# Patient Record
Sex: FEMALE | Race: WHITE | Employment: OTHER | URBAN - METROPOLITAN AREA
[De-identification: names, ages, dates, MRNs, and addresses within clinical notes are randomized per-mention and may not be internally consistent; named-entity substitution may affect disease eponyms.]

---

## 2017-02-14 DIAGNOSIS — I10 ESSENTIAL HYPERTENSION: ICD-10-CM

## 2017-02-14 DIAGNOSIS — E78.2 MIXED HYPERLIPIDEMIA: ICD-10-CM

## 2017-02-14 RX ORDER — ATORVASTATIN CALCIUM 10 MG/1
10 TABLET, FILM COATED ORAL DAILY
Qty: 90 TAB | Refills: 0 | Status: SHIPPED | OUTPATIENT
Start: 2017-02-14 | End: 2017-03-02 | Stop reason: SDUPTHER

## 2017-02-14 RX ORDER — LISINOPRIL 5 MG/1
5 TABLET ORAL DAILY
Qty: 90 TAB | Refills: 0 | Status: SHIPPED | OUTPATIENT
Start: 2017-02-14 | End: 2017-02-15 | Stop reason: SDUPTHER

## 2017-02-15 DIAGNOSIS — I10 ESSENTIAL HYPERTENSION: ICD-10-CM

## 2017-02-15 RX ORDER — LISINOPRIL 5 MG/1
TABLET ORAL
Qty: 30 TAB | Refills: 0 | Status: SHIPPED | OUTPATIENT
Start: 2017-02-15 | End: 2017-02-21 | Stop reason: SDUPTHER

## 2017-02-17 ENCOUNTER — HOSPITAL ENCOUNTER (EMERGENCY)
Age: 82
Discharge: HOME | End: 2017-02-17
Attending: EMERGENCY MEDICINE
Payer: MEDICARE

## 2017-02-17 VITALS
DIASTOLIC BLOOD PRESSURE: 92 MMHG | WEIGHT: 145 LBS | SYSTOLIC BLOOD PRESSURE: 189 MMHG | OXYGEN SATURATION: 96 % | RESPIRATION RATE: 20 BRPM | HEIGHT: 60 IN | TEMPERATURE: 98 F | BODY MASS INDEX: 28.47 KG/M2 | HEART RATE: 89 BPM

## 2017-02-17 DIAGNOSIS — B02.9 HERPES ZOSTER WITHOUT COMPLICATION: Primary | ICD-10-CM

## 2017-02-17 PROCEDURE — 99283 EMERGENCY DEPT VISIT LOW MDM: CPT

## 2017-02-17 RX ORDER — TRAMADOL HYDROCHLORIDE 50 MG/1
50 TABLET ORAL
Qty: 15 TAB | Refills: 0 | Status: SHIPPED | OUTPATIENT
Start: 2017-02-17 | End: 2017-07-07

## 2017-02-17 RX ORDER — VALACYCLOVIR HYDROCHLORIDE 1 G/1
1000 TABLET, FILM COATED ORAL 3 TIMES DAILY
Qty: 21 TAB | Refills: 0 | Status: SHIPPED | OUTPATIENT
Start: 2017-02-17 | End: 2017-02-24

## 2017-02-17 NOTE — ED TRIAGE NOTES
Pt states rash on the right side of her neck and scalp states began 1 week ago states painful and itching denies fevers and other symptoms

## 2017-02-17 NOTE — ED PROVIDER NOTES
HPI   Patient is a 81 yo female that is coming for evaluation of rash on the back of her neck. Patient states that around 1 week ago she started to develop some pain around her neck and the right trapezius muscle when she later developed a rash. Rash started as one vesicle that spread later to her scalp, upper and right side of the neck. Patient states rash is painful and very itchy, has tried OTC hydrocortisone 1% which helped with her symptoms. Denies any fever or chills, new perfumes, creams, soaps or detergents. She had her shingles vaccine. No other concerns or complaints at this time.      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, episode of care unspecified     Allergic rhinitis, cause unspecified     Brachial neuritis or radiculitis NOS     Cervicalgia     Disorder of bone and cartilage, unspecified     Dizziness and giddiness     Esophageal reflux     Hemorrhoid     Hypercholesterolemia     Insomnia, unspecified     Osteoarthrosis, unspecified whether generalized or localized, unspecified site     Osteoporosis, unspecified     Other abnormal blood chemistry     Other and unspecified hyperlipidemia     Pain in joint, forearm     Pain in joint, pelvic region and thigh     Pain in joint, shoulder region     Unspecified essential hypertension     Unspecified transient cerebral ischemia     Unspecified vitamin D deficiency        Past Surgical History:   Procedure Laterality Date    Hx cholecystectomy      Hx partial hysterectomy      Hx cataract removal Bilateral     Hx bunionectomy      Hx tubal ligation           Family History:   Problem Relation Age of Onset    Cancer Other     Stroke Mother     Stroke Father     Heart Disease Sister     Heart Disease Brother     Stroke Brother        Social History     Social History    Marital status:      Spouse name: N/A    Number of children: N/A    Years of education: N/A Occupational History    Not on file. Social History Main Topics    Smoking status: Former Smoker     Types: Cigarettes     Quit date: 3/3/1995    Smokeless tobacco: Never Used    Alcohol use 14.0 oz/week     28 Standard drinks or equivalent per week      Comment: Lite beer    Drug use: No    Sexual activity: No     Other Topics Concern    Not on file     Social History Narrative         ALLERGIES: Egg    Review of Systems   Constitutional: Negative for chills and fever. HENT: Negative for ear pain and facial swelling. Eyes: Negative for pain. Respiratory: Negative for shortness of breath and wheezing. Cardiovascular: Negative for chest pain. Musculoskeletal: Positive for neck pain. Negative for arthralgias. Skin: Positive for rash. Itchiness       Neurological: Negative for dizziness and headaches. Hematological: Negative for adenopathy. Vitals:    02/17/17 0931 02/17/17 1031   BP: (!) 189/92    Pulse: 89    Resp: 20    Temp: 98 °F (36.7 °C)    SpO2: 100% 96%   Weight: 65.8 kg (145 lb)    Height: 5' (1.524 m)             Physical Exam   General appearance - alert, well appearing, and in no distress  Mental status - alert, oriented to person, place, and time  Eyes - pupils equal and reactive, extraocular eye movements intact  Ears - bilateral TM's and external ear canals normal  Nose - normal and patent, no erythema, discharge or polyps  Mouth - mucous membranes moist, pharynx normal without lesions  Neck - supple, no significant adenopathy   Chest - clear to auscultation, no wheezes, rales or rhonchi, symmetric air entry  Heart - normal rate, regular rhythm, normal S1, S2, no murmurs, rubs, clicks or gallops  Skin - multiples area of an eczematous rash with vesicles on the back and right side of the neck spreading to her scalp. One dry vesicle located above her right scapula.           MDM  Number of Diagnoses or Management Options  Herpes zoster without complication: established and improving     Amount and/or Complexity of Data Reviewed  Obtain history from someone other than the patient: yes  Review and summarize past medical records: yes  Discuss the patient with other providers: yes    Risk of Complications, Morbidity, and/or Mortality  Presenting problems: moderate  Diagnostic procedures: moderate  Management options: moderate      ED Course     Patient is a 79 yo female that is coming for evaluation of rash. Diagnosed with shingles. Will discharge home with course of Valtrex 1g TID x 7 days along with pain meds (Tramadol Q8 prn). BP also noted to be elevated to 188/89, likely due to pain, advised patient to follow up with PCP within 1 week for this as well. An After Visit Summary was printed and given to the patient. Patient/Plan discussed in details with Dr. Loreto Dawkins (ED Attending Physician).     Lisa Cotton MD  Family Medicine Resident          Procedures

## 2017-02-17 NOTE — DISCHARGE INSTRUCTIONS

## 2017-02-21 DIAGNOSIS — I10 ESSENTIAL HYPERTENSION: ICD-10-CM

## 2017-02-21 RX ORDER — LISINOPRIL 5 MG/1
TABLET ORAL
Qty: 30 TAB | Refills: 0 | Status: SHIPPED | OUTPATIENT
Start: 2017-02-21 | End: 2017-03-02 | Stop reason: SDUPTHER

## 2017-02-21 NOTE — TELEPHONE ENCOUNTER
Patient is asking for her 90 day supply of BP medication to be mailed to her since her  just has surgery and can't drive her to her apt for tomorrow.     Please advise thanks

## 2017-03-02 ENCOUNTER — OFFICE VISIT (OUTPATIENT)
Dept: FAMILY MEDICINE CLINIC | Age: 82
End: 2017-03-02

## 2017-03-02 VITALS
DIASTOLIC BLOOD PRESSURE: 87 MMHG | HEIGHT: 60 IN | SYSTOLIC BLOOD PRESSURE: 149 MMHG | OXYGEN SATURATION: 98 % | WEIGHT: 145 LBS | RESPIRATION RATE: 18 BRPM | HEART RATE: 88 BPM | BODY MASS INDEX: 28.47 KG/M2 | TEMPERATURE: 98 F

## 2017-03-02 DIAGNOSIS — Z00.00 ROUTINE GENERAL MEDICAL EXAMINATION AT A HEALTH CARE FACILITY: Primary | ICD-10-CM

## 2017-03-02 DIAGNOSIS — I10 ESSENTIAL HYPERTENSION: ICD-10-CM

## 2017-03-02 DIAGNOSIS — E78.2 MIXED HYPERLIPIDEMIA: ICD-10-CM

## 2017-03-02 DIAGNOSIS — Z13.39 SCREENING FOR ALCOHOLISM: ICD-10-CM

## 2017-03-02 DIAGNOSIS — K21.9 GASTROESOPHAGEAL REFLUX DISEASE WITHOUT ESOPHAGITIS: ICD-10-CM

## 2017-03-02 RX ORDER — LISINOPRIL 5 MG/1
TABLET ORAL
Qty: 90 TAB | Refills: 3 | Status: SHIPPED | OUTPATIENT
Start: 2017-03-02 | End: 2017-03-02 | Stop reason: SDUPTHER

## 2017-03-02 RX ORDER — LISINOPRIL 5 MG/1
TABLET ORAL
Qty: 90 TAB | Refills: 3 | Status: SHIPPED | OUTPATIENT
Start: 2017-03-02 | End: 2017-06-01 | Stop reason: SDUPTHER

## 2017-03-02 RX ORDER — PHENOL/SODIUM PHENOLATE
20 AEROSOL, SPRAY (ML) MUCOUS MEMBRANE DAILY
Qty: 90 TAB | Refills: 3 | Status: SHIPPED | OUTPATIENT
Start: 2017-03-02 | End: 2017-11-29 | Stop reason: SDUPTHER

## 2017-03-02 RX ORDER — ATORVASTATIN CALCIUM 10 MG/1
10 TABLET, FILM COATED ORAL DAILY
Qty: 90 TAB | Refills: 3 | Status: SHIPPED | OUTPATIENT
Start: 2017-03-02 | End: 2017-03-02 | Stop reason: SDUPTHER

## 2017-03-02 RX ORDER — ATORVASTATIN CALCIUM 10 MG/1
10 TABLET, FILM COATED ORAL DAILY
Qty: 90 TAB | Refills: 3 | Status: SHIPPED | OUTPATIENT
Start: 2017-03-02 | End: 2018-03-14 | Stop reason: SDUPTHER

## 2017-03-02 RX ORDER — PHENOL/SODIUM PHENOLATE
20 AEROSOL, SPRAY (ML) MUCOUS MEMBRANE DAILY
Qty: 90 TAB | Refills: 3 | Status: SHIPPED | OUTPATIENT
Start: 2017-03-02 | End: 2017-03-02 | Stop reason: SDUPTHER

## 2017-03-02 NOTE — PATIENT INSTRUCTIONS

## 2017-03-02 NOTE — PROGRESS NOTES
1. Have you been to the ER, urgent care clinic since your last visit? Hospitalized since your last visit? No    2. Have you seen or consulted any other health care providers outside of the [address] [place] [address] since your last visit? Include any pap smears or colon screening.  No   Chief Complaint   Patient presents with   [facility] Annual Wellness Visit

## 2017-03-02 NOTE — PROGRESS NOTES
This is a Subsequent Medicare Annual Wellness Visit providing Personalized Prevention Plan Services (PPPS) (Performed 12 months after initial AWV and PPPS )    I have reviewed the patient's medical history in detail and updated the computerized patient record. 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, episode of care unspecified     Allergic rhinitis, cause unspecified     Brachial neuritis or radiculitis NOS     Cervicalgia     Disorder of bone and cartilage, unspecified     Dizziness and giddiness     Esophageal reflux     Hemorrhoid     Hypercholesterolemia     Insomnia, unspecified     Osteoarthrosis, unspecified whether generalized or localized, unspecified site     Osteoporosis, unspecified     Other abnormal blood chemistry     Other and unspecified hyperlipidemia     Pain in joint, forearm     Pain in joint, pelvic region and thigh     Pain in joint, shoulder region     Unspecified essential hypertension     Unspecified transient cerebral ischemia     Unspecified vitamin D deficiency       Past Surgical History:   Procedure Laterality Date    HX BUNIONECTOMY      HX CATARACT REMOVAL Bilateral     HX CHOLECYSTECTOMY      HX PARTIAL HYSTERECTOMY      HX TUBAL LIGATION       Current Outpatient Prescriptions   Medication Sig Dispense Refill    lisinopril (PRINIVIL, ZESTRIL) 5 mg tablet TAKE ONE TABLET BY MOUTH ONCE DAILY FOR HYPERTENSION. 90 Tab 3    atorvastatin (LIPITOR) 10 mg tablet Take 1 Tab by mouth daily. 90 Tab 3    Omeprazole delayed release (PRILOSEC D/R) 20 mg tablet Take 1 Tab by mouth daily. 90 Tab 3    traMADol (ULTRAM) 50 mg tablet Take 1 Tab by mouth every eight (8) hours as needed for Pain. Max Daily Amount: 150 mg. 15 Tab 0    VIT C/CHERRY & CELERY EX/GRP E (TART CHERRY PO) Take  by mouth.  loratadine (CLARITIN) 10 mg tablet Take 10 mg by mouth daily.       naproxen sodium (ALEVE) 220 mg tablet Take 220 mg by mouth two (2) times daily (with meals). Allergies   Allergen Reactions    Egg Nausea Only     Family History   Problem Relation Age of Onset    Cancer Other     Stroke Mother     Stroke Father     Heart Disease Sister     Heart Disease Brother     Stroke Brother      Social History   Substance Use Topics    Smoking status: Former Smoker     Types: Cigarettes     Quit date: 3/3/1995    Smokeless tobacco: Never Used    Alcohol use 14.0 oz/week     28 Standard drinks or equivalent per week      Comment: Lite beer     Patient Active Problem List   Diagnosis Code    Daytime somnolence R40.0    DDD (degenerative disc disease) [id number]    Hyperlipidemia E78.5       Depression Risk Factor Screening:     PHQ 2 / 9, over the last two weeks 3/2/2017   Little interest or pleasure in doing things Not at all   Feeling down, depressed or hopeless Not at all   Total Score PHQ 2 0     Alcohol Risk Factor Screening: On any occasion during the past 3 months, have you had more than 3 drinks containing alcohol?  yes    Do you average more than 7 drinks per week? Yes      Functional Ability and Level of Safety:     Hearing Loss   normal-to-mild    Activities of Daily Living   Self-care. Requires assistance with: no ADLs    Fall Risk     Fall Risk Assessment, last 12 mths 3/2/2017   Able to walk? Yes   Fall in past 12 months? No   Fall with injury? -   Number of falls in past 12 months -   Fall Risk Score -     Abuse Screen   Patient is not abused    Review of Systems   A comprehensive review of systems was negative except for that written in the HPI.     Physical Examination     Evaluation of Cognitive Function:  Mood/affect:  happy  Appearance: age appropriate  Family member/caregiver input: none present    Visit Vitals    /87 (BP 1 Location: Right arm, BP Patient Position: Sitting)    Pulse 88    Temp 98 °F (36.7 °C) (Oral)    Resp 18    Ht 5' (1.524 m)    Wt 145 lb (65.8 kg)    SpO2 98%    BMI 28.32 kg/m2     General:  Alert, cooperative, no distress, appears stated age. Head:  Normocephalic, without obvious abnormality, atraumatic. Eyes:  Conjunctivae/corneas clear. PERRL, EOMs intact. Fundi benign. Ears:  Normal TMs and external ear canals both ears. Nose: Nares normal. Septum midline. Mucosa normal. No drainage or sinus tenderness. Throat: Lips, mucosa, and tongue normal. Teeth and gums normal.   Neck: Supple, symmetrical, trachea midline, no adenopathy, thyroid: no enlargement/tenderness/nodules, no carotid bruit and no JVD. Back:   Symmetric, no curvature. ROM normal. No CVA tenderness. Lungs:   Clear to auscultation bilaterally. Chest wall:  No tenderness or deformity. Heart:  Regular rate and rhythm, S1, S2 normal, 2/6 BERNICE murmur on the right SB,no click, rub or gallop. Breast Exam:  No tenderness, masses, or nipple abnormality. Abdomen:   Soft, non-tender. Bowel sounds normal. No masses,  No organomegaly. Extremities: Extremities normal, atraumatic, no cyanosis or edema. Pulses: 2+ and symmetric all extremities. Skin: Skin color, texture, turgor normal. No rashes or lesions. Lymph nodes: Cervical, supraclavicular, and axillary nodes normal.   Neurologic: CNII-XII intact. Normal strength, sensation and reflexes throughout. Patient Care Team:  Guy Lopez MD as PCP - General (Family Practice)  Andrae Morales MD (Orthopedic Surgery)    Advice/Referrals/Counseling   Education and counseling provided:  Are appropriate based on today's review and evaluation      Assessment/Plan       ICD-10-CM ICD-9-CM    1. Routine general medical examination at a health care facility Z00.00 V70.0    2. Screening for alcoholism Z13.89 V79.1    3. Essential hypertension D05 733.5 METABOLIC PANEL, COMPREHENSIVE      lisinopril (PRINIVIL, ZESTRIL) 5 mg tablet      DISCONTINUED: lisinopril (PRINIVIL, ZESTRIL) 5 mg tablet   4.  Mixed hyperlipidemia E78.2 272.2 atorvastatin (LIPITOR) 10 mg tablet      DISCONTINUED: atorvastatin (LIPITOR) 10 mg tablet   5. Gastroesophageal reflux disease without esophagitis K21.9 530.81 Omeprazole delayed release (PRILOSEC D/R) 20 mg tablet      DISCONTINUED: Omeprazole delayed release (PRILOSEC D/R) 20 mg tablet   . Discussed ACP for more than 30 min but did not complete the form. We went through the form, discussed it.  She was given two forms for her  to complete as well as her

## 2017-03-02 NOTE — MR AVS SNAPSHOT
Visit Information Date & Time Provider Department Dept. Phone Encounter #  
 3/2/2017  1:45 PM Michelle Anaya MD [address] [place], [place] [id number] Follow-up Instructions Return in about 3 months (around 6/2/2017). Upcoming Health Maintenance Date Due DTaP/Tdap/Td series (1 - Tdap) 7/4/1952 ZOSTER VACCINE AGE 60> 7/4/1991 GLAUCOMA SCREENING Q2Y 7/4/1996 Pneumococcal 65+ Low/Medium Risk (1 of 2 - PCV13) 7/4/1996 INFLUENZA AGE 9 TO ADULT 8/1/2016 MEDICARE YEARLY EXAM 10/20/2016 Allergies as of 3/2/2017  Review Complete On: 3/2/2017 By: Michelle Anaya MD  
  
 Severity Noted Reaction Type Reactions Egg Medium 05/08/2015   Intolerance Nausea Only Current Immunizations  Reviewed on 10/20/2015 Name Date Influenza Vaccine (Madin Misti Canine Kidney) PF 9/20/2015, 8/14/2014 Not reviewed this visit You Were Diagnosed With   
  
 Codes Comments Essential hypertension    -  Primary ICD-10-CM: I10 
ICD-9-CM: 401.9 Routine general medical examination at a health care facility     ICD-10-CM: Z00.00 ICD-9-CM: V70.0 Screening for alcoholism     ICD-10-CM: Z13.89 ICD-9-CM: V79.1 Mixed hyperlipidemia     ICD-10-CM: E78.2 ICD-9-CM: 272.2 Gastroesophageal reflux disease without esophagitis     ICD-10-CM: K21.9 ICD-9-CM: 530.81 Vitals BP  
  
  
  
  
  
 149/87 (BP 1 Location: Right arm, BP Patient Position: Sitting) BMI and BSA Data Body Mass Index Body Surface Area  
 28.32 kg/m 2 1.67 m 2 Preferred Pharmacy Pharmacy Name Phone [id number] - [place], [address] [phone] Your Updated Medication List  
  
   
This list is accurate as of: 3/2/17  2:32 PM.  Always use your most recent med list.  
  
  
  
  
 ALEVE 220 mg tablet Generic drug:  naproxen sodium Take 220 mg by mouth two (2) times daily (with meals). atorvastatin 10 mg tablet Commonly known as:  LIPITOR Take 1 Tab by mouth daily. lisinopril 5 mg tablet Commonly known as:  PRINIVIL, ZESTRIL  
TAKE ONE TABLET BY MOUTH ONCE DAILY FOR HYPERTENSION. loratadine 10 mg tablet Commonly known as:  Rah Sor Take 10 mg by mouth daily. Omeprazole delayed release 20 mg tablet Commonly known as:  PRILOSEC D/R Take 1 Tab by mouth daily. TART CHERRY PO Take  by mouth. traMADol 50 mg tablet Commonly known as:  ULTRAM  
Take 1 Tab by mouth every eight (8) hours as needed for Pain. Max Daily Amount: 150 mg.  
  
  
  
  
Prescriptions Printed Refills  
 lisinopril (PRINIVIL, ZESTRIL) 5 mg tablet 3 Sig: TAKE ONE TABLET BY MOUTH ONCE DAILY FOR HYPERTENSION. Class: Print  
 atorvastatin (LIPITOR) 10 mg tablet 3 Sig: Take 1 Tab by mouth daily. Class: Print Route: Oral  
 Omeprazole delayed release (PRILOSEC D/R) 20 mg tablet 3 Sig: Take 1 Tab by mouth daily. Class: Print Route: Oral  
  
We Performed the Following METABOLIC PANEL, COMPREHENSIVE [13447 CPT(R)] Follow-up Instructions Return in about 3 months (around 6/2/2017). Patient Instructions Well Visit, Over 72: Care Instructions Your Care Instructions Physical exams can help you stay healthy. Your doctor has checked your overall health and may have suggested ways to take good care of yourself. He or she also may have recommended tests. At home, you can help prevent illness with healthy eating, regular exercise, and other steps. Follow-up care is a key part of your treatment and safety. Be sure to make and go to all appointments, and call your doctor if you are having problems. It's also a good idea to know your test results and keep a list of the medicines you take. How can you care for yourself at home? · Reach and stay at a healthy weight.  This will lower your risk for many problems, such as obesity, diabetes, heart disease, and high blood pressure. · Get at least 30 minutes of exercise on most days of the week. Walking is a good choice. You also may want to do other activities, such as running, swimming, cycling, or playing tennis or team sports. · Do not smoke. Smoking can make health problems worse. If you need help quitting, talk to your doctor about stop-smoking programs and medicines. These can increase your chances of quitting for good. · Protect your skin from too much sun. When you're outdoors from 10 a.m. to 4 p.m., stay in the shade or cover up with clothing and a hat with a wide brim. Wear sunglasses that block UV rays. Even when it's cloudy, put broad-spectrum sunscreen (SPF 30 or higher) on any exposed skin. · See a dentist one or two times a year for checkups and to have your teeth cleaned. · Wear a seat belt in the car. · Limit alcohol to 2 drinks a day for men and 1 drink a day for women. Too much alcohol can cause health problems. Follow your doctor's advice about when to have certain tests. These tests can spot problems early. For men and women · Cholesterol. Your doctor will tell you how often to have this done based on your overall health and other things that can increase your risk for heart attack and stroke. · Blood pressure. Have your blood pressure checked during a routine doctor visit. Your doctor will tell you how often to check your blood pressure based on your age, your blood pressure results, and other factors. · Diabetes. Ask your doctor whether you should have tests for diabetes. · Vision. Experts recommend that you have yearly exams for glaucoma and other age-related eye problems. · Hearing. Tell your doctor if you notice any change in your hearing. You can have tests to find out how well you hear. · Colon cancer tests. Keep having colon cancer tests as your doctor recommends. You can have one of several types of tests. · Heart attack and stroke risk. At least every 4 to 6 years, you should have your risk for heart attack and stroke assessed. Your doctor uses factors such as your age, blood pressure, cholesterol, and whether you smoke or have diabetes to show what your risk for a heart attack or stroke is over the next 10 years. · Osteoporosis. Talk to your doctor about whether you should have a bone density test to find out whether you have thinning bones. Also ask your doctor about whether you should take calcium and vitamin D supplements. For women · Pap test and pelvic exam. You may no longer need a Pap test. Talk with your doctor about whether to stop or continue to have Pap tests. · Breast exam and mammogram. Ask how often you should have a mammogram, which is an X-ray of your breasts. A mammogram can spot breast cancer before it can be felt and when it is easiest to treat. · Thyroid disease. Talk to your doctor about whether to have your thyroid checked as part of a regular physical exam. Women have an increased chance of a thyroid problem. For men · Prostate exam. Talk to your doctor about whether you should have a blood test (called a PSA test) for prostate cancer. Experts disagree on whether men should have this test. Some experts recommend that you discuss the benefits and risks of the test with your doctor. · Abdominal aortic aneurysm. Ask your doctor whether you should have a test to check for an aneurysm. You may need a test if you ever smoked or if your parent, brother, sister, or child has had an aneurysm. When should you call for help? Watch closely for changes in your health, and be sure to contact your doctor if you have any problems or symptoms that concern you. Where can you learn more? Go to http://pierre-shannan.info/. Enter K277 in the search box to learn more about \"Well Visit, Over 65: Care Instructions. \" Current as of: July 19, 2016 Content Version: 11.1 © 7784-9358 [employer]wise, [employer]. Care instructions adapted under license by [employer] (which disclaims liability or warranty for this information). If you have questions about a medical condition or this instruction, always ask your healthcare professional. [address] any warranty or liability for your use of this information. Introducing [facility]! Rasheed Holder introduces [employer] patient portal. Now you can access parts of your medical record, email your doctor's office, and request medication refills online. 1. In your internet browser, go to [email or web address] 2. Click on the First Time User? Click Here link in the Sign In box. You will see the New Member Sign Up page. 3. Enter your [employer] Access Code exactly as it appears below. You will not need to use this code after youve completed the sign-up process. If you do not sign up before the expiration date, you must request a new code. · [employer] Access Code: [email or web address] Expires: 5/18/2017 10:26 AM 
 
4. Enter the last four digits of your Social Security Number (xxxx) and Date of Birth (mm/dd/yyyy) as indicated and click Submit. You will be taken to the next sign-up page. 5. Create a [employer]on ID. This will be your [employer]on login ID and cannot be changed, so think of one that is secure and easy to remember. 6. Create a [employer] password. You can change your password at any time. 7. Enter your Password Reset Question and Answer. This can be used at a later time if you forget your password. 8. Enter your e-mail address. You will receive e-mail notification when new information is available in [address] 9. Click Sign Up. You can now view and download portions of your medical record. 10. Click the Download Summary menu link to download a portable copy of your medical information.  
 
If you have questions, please visit the Frequently Asked Questions section of the [employer]. Remember, [employer] is NOT to be used for urgent needs. For medical emergencies, dial 911. Now available from your iPhone and Android! Please provide this summary of care documentation to your next provider. Your primary care clinician is listed as Conor Howard. If you have any questions after today's visit, please call [phone].

## 2017-03-07 LAB
ALBUMIN SERPL-MCNC: 4.2 G/DL (ref 3.5–4.7)
ALBUMIN/GLOB SERPL: 1.8 {RATIO} (ref 1.1–2.5)
ALP SERPL-CCNC: 113 IU/L (ref 39–117)
ALT SERPL-CCNC: 22 IU/L (ref 0–32)
AST SERPL-CCNC: 19 IU/L (ref 0–40)
BILIRUB SERPL-MCNC: 0.3 MG/DL (ref 0–1.2)
BUN SERPL-MCNC: 16 MG/DL (ref 8–27)
BUN/CREAT SERPL: 21 (ref 11–26)
CALCIUM SERPL-MCNC: 9.4 MG/DL (ref 8.7–10.3)
CHLORIDE SERPL-SCNC: 104 MMOL/L (ref 96–106)
CO2 SERPL-SCNC: 26 MMOL/L (ref 18–29)
CREAT SERPL-MCNC: 0.78 MG/DL (ref 0.57–1)
GLOBULIN SER CALC-MCNC: 2.4 G/DL (ref 1.5–4.5)
GLUCOSE SERPL-MCNC: 95 MG/DL (ref 65–99)
POTASSIUM SERPL-SCNC: 5.1 MMOL/L (ref 3.5–5.2)
PROT SERPL-MCNC: 6.6 G/DL (ref 6–8.5)
SODIUM SERPL-SCNC: 143 MMOL/L (ref 134–144)

## 2017-03-13 NOTE — PROGRESS NOTES
Spoke with patient and advised of normal labs. Patient verbalized understanding and had no questions at this time.

## 2017-05-13 ENCOUNTER — HOSPITAL ENCOUNTER (EMERGENCY)
Age: 82
Discharge: HOME | End: 2017-05-13
Attending: EMERGENCY MEDICINE
Payer: MEDICARE

## 2017-05-13 VITALS
BODY MASS INDEX: 30.43 KG/M2 | DIASTOLIC BLOOD PRESSURE: 67 MMHG | OXYGEN SATURATION: 96 % | HEART RATE: 81 BPM | RESPIRATION RATE: 16 BRPM | WEIGHT: 155 LBS | TEMPERATURE: 97.8 F | SYSTOLIC BLOOD PRESSURE: 171 MMHG | HEIGHT: 60 IN

## 2017-05-13 DIAGNOSIS — F41.1 ANXIETY REACTION: Primary | ICD-10-CM

## 2017-05-13 PROCEDURE — 99284 EMERGENCY DEPT VISIT MOD MDM: CPT

## 2017-05-13 NOTE — ED NOTES
Unable to finish triage at this time due to student NP evaluating patient- will finish as soon as possible

## 2017-05-13 NOTE — ED TRIAGE NOTES
Pt arrives by EMS that states pt got upset earlier and felt like she was having anxiety; per EMS blood pressure was high. Pt denies any chest pain or shortness of breath at this time.

## 2017-05-13 NOTE — ED PROVIDER NOTES
HPI Comments: 80 y.o. female with past medical history significant for hemorrhoids, hypercholesterolemia, cervicalgia, osteoporosis, HTN, cholecystectomy, partial hysterectomy, tubal ligation who presents via EMS with chief complaint of anxiety. Patient presents in ED secondary to suspected \"anxiety attack\" ~45 minutes ago. Patient states she was reading the newspaper this morning and came across something that upset her. She notes she proceeded to have a \"tingling\" sensation in head and b/l hands that spread to \"whole body\". Patient reports hx of panic attack but notes \"it was so long ago, I do not know if this feels similar\". Patient notes she began to calm down and claims that sx resolved en route to ED while with EMS. In ED, patient explains that she \"feels better\". She denies having eaten yet today and notes \"I was about to go get lunch at the [facility]". She denies any weakness, slurred speech, facial droop, numbness, dizziness, visual disturbance, HA, SOB, cough, chest pain, congestion. There are no other acute medical concerns at this time. Social hx: former smoker; quit date: 3/3/1995. +ETOH use; 28 beers per week. PCP: Ashlie Sandhu MD    Note written by Krys Persaud. [place] [employer], as dictated by Kadeem Canela MD 10:58 AM      The history is provided by the patient and the EMS personnel. No  was used.         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, episode of care unspecified     Allergic rhinitis, cause unspecified     Brachial neuritis or radiculitis NOS     Cervicalgia     Disorder of bone and cartilage, unspecified     Dizziness and giddiness     Esophageal reflux     Hemorrhoid     Hypercholesterolemia     Insomnia, unspecified     Osteoarthrosis, unspecified whether generalized or localized, unspecified site     Osteoporosis, unspecified     Other abnormal blood chemistry     Other and unspecified hyperlipidemia     Pain in joint, forearm     Pain in joint, pelvic region and thigh     Pain in joint, shoulder region     Unspecified essential hypertension     Unspecified transient cerebral ischemia     Unspecified vitamin D deficiency        Past Surgical History:   Procedure Laterality Date    HX BUNIONECTOMY      HX CATARACT REMOVAL Bilateral     HX CHOLECYSTECTOMY      HX PARTIAL HYSTERECTOMY      HX TUBAL LIGATION           Family History:   Problem Relation Age of Onset    Cancer Other     Stroke Mother     Stroke Father     Heart Disease Sister     Heart Disease Brother     Stroke Brother        Social History     Social History    Marital status:      Spouse name: N/A    Number of children: N/A    Years of education: N/A     Occupational History    Not on file. Social History Main Topics    Smoking status: Former Smoker     Types: Cigarettes     Quit date: 3/3/1995    Smokeless tobacco: Never Used    Alcohol use 14.0 oz/week     28 Standard drinks or equivalent per week      Comment: Lite beer    Drug use: No    Sexual activity: No     Other Topics Concern    Not on file     Social History Narrative         ALLERGIES: Egg    Review of Systems   Constitutional: Negative. Negative for appetite change, fever and unexpected weight change. HENT: Negative. Negative for congestion, ear pain, hearing loss, nosebleeds, rhinorrhea, sore throat and trouble swallowing. Eyes: Negative for visual disturbance. Respiratory: Negative. Negative for cough, chest tightness and shortness of breath. Cardiovascular: Negative. Negative for chest pain and palpitations. Gastrointestinal: Negative. Negative for abdominal distention, abdominal pain, blood in stool and vomiting. Endocrine: Negative. Genitourinary: Negative for dysuria and hematuria. Musculoskeletal: Negative. Negative for back pain and myalgias. Skin: Negative. Negative for rash. Allergic/Immunologic: Negative. Neurological: Positive for numbness (resolved). Negative for dizziness, syncope, facial asymmetry, speech difficulty, weakness and headaches. Hematological: Negative. Psychiatric/Behavioral: The patient is nervous/anxious (resolved). All other systems reviewed and are negative. Vitals:    05/13/17 1055 05/13/17 1100 05/13/17 1108   BP: (!) 212/88 174/80 171/67   Pulse: 92  81   Resp: 16  16   Temp: 97.9 °F (36.6 °C)  97.8 °F (36.6 °C)   SpO2: 95% 96% 96%   Weight: 70.3 kg (155 lb)     Height: 5' (1.524 m)              Physical Exam   Constitutional: She is oriented to person, place, and time. She appears well-developed and well-nourished. No distress. HENT:   Head: Normocephalic and atraumatic. Right Ear: External ear normal.   Left Ear: External ear normal.   Nose: Nose normal.   Mouth/Throat: Oropharynx is clear and moist.   Eyes: Conjunctivae and EOM are normal. Pupils are equal, round, and reactive to light. Neck: Normal range of motion. Neck supple. No JVD present. No thyromegaly present. Cardiovascular: Normal rate, regular rhythm, normal heart sounds and intact distal pulses. No murmur heard. Pulmonary/Chest: Effort normal and breath sounds normal. No respiratory distress. She has no wheezes. She has no rales. Abdominal: Soft. Bowel sounds are normal. She exhibits no distension. There is no tenderness. Musculoskeletal: Normal range of motion. She exhibits no edema. Neurological: She is alert and oriented to person, place, and time. No cranial nerve deficit. Skin: Skin is warm and dry. No rash noted. Psychiatric: She has a normal mood and affect. Her behavior is normal. Thought content normal.    Note written by Marcos Russo. Carol Conn, as dictated by Ignacia Conroy MD 10:58 PM      [facility]  ED Course       Procedures    PROGRESS NOTE:  11:00 AM  ASSESSMENT & PLAN:  Mild anxiety. Now resolved.  Will discharge home with instructions to follow up with PCP if needed.

## 2017-06-01 ENCOUNTER — OFFICE VISIT (OUTPATIENT)
Dept: FAMILY MEDICINE CLINIC | Age: 82
End: 2017-06-01

## 2017-06-01 VITALS
RESPIRATION RATE: 15 BRPM | DIASTOLIC BLOOD PRESSURE: 84 MMHG | OXYGEN SATURATION: 92 % | WEIGHT: 157 LBS | TEMPERATURE: 98.5 F | SYSTOLIC BLOOD PRESSURE: 172 MMHG | BODY MASS INDEX: 30.82 KG/M2 | HEIGHT: 60 IN | HEART RATE: 83 BPM

## 2017-06-01 DIAGNOSIS — I10 ESSENTIAL HYPERTENSION: ICD-10-CM

## 2017-06-01 DIAGNOSIS — E78.2 MIXED HYPERLIPIDEMIA: Primary | ICD-10-CM

## 2017-06-01 DIAGNOSIS — R01.1 MURMUR: ICD-10-CM

## 2017-06-01 RX ORDER — LISINOPRIL 10 MG/1
TABLET ORAL
Qty: 90 TAB | Refills: 3 | Status: SHIPPED | OUTPATIENT
Start: 2017-06-01 | End: 2017-07-18 | Stop reason: DRUGHIGH

## 2017-06-01 NOTE — PATIENT INSTRUCTIONS
Heart Murmur: Care Instructions  Your Care Instructions    A heart murmur is a blowing, whooshing, or rasping sound made by blood moving through the heart or the blood vessels near the heart. Murmurs can be heard through a stethoscope. Heart murmurs can occur during pregnancy or during a temporary illness, such as a fever. These murmurs usually are not a problem and go away on their own. However, sometimes a heart murmur is a sign of a serious problem, such as congenital heart disease or heart valve problems, that may need treatment. You may need more tests to check your heart. The treatment depends on the specific heart problem causing the murmur. Follow-up care is a key part of your treatment and safety. Be sure to make and go to all appointments, and call your doctor if you are having problems. It's also a good idea to know your test results and keep a list of the medicines you take. How can you care for yourself at home? · Take your medicines exactly as prescribed. Call your doctor if you think you are having a problem with your medicine. You will get more details on the specific medicines your doctor prescribes. · If your doctor recommends it, limit over-the-counter medicines that have stimulants in them. These include decongestants and cold and flu medicines. · If your doctor recommends it, get more exercise. Walking is a good choice. Bit by bit, increase the amount you walk every day. Try for 30 minutes on most days of the week. You also may want to swim, bike, or do other activities. · Do not smoke. Smoking increases your risk of heart attack and stroke. If you need help quitting, talk to your doctor about stop-smoking programs and medicines. These can increase your chances of quitting for good. · Limit alcohol to 2 drinks a day for men and 1 drink a day for women. Alcohol may interfere with some of your medicines. When should you call for help?   Call 911 anytime you think you may need emergency care. For example, call if:  · You have severe trouble breathing. · You cough up pink, foamy mucus and you have trouble breathing. · You passed out (lost consciousness). · You have a seizure. · You have symptoms of a stroke. These may include:  ¨ Sudden numbness, tingling, weakness, or loss of movement in your face, arm, or leg, especially on only one side of your body. ¨ Sudden vision changes. ¨ Sudden trouble speaking. ¨ Sudden confusion or trouble understanding simple statements. ¨ Sudden problems with walking or balance. ¨ A sudden, severe headache that is different from past headaches. Call your doctor now or seek immediate medical care if:  · You have new or increased shortness of breath. · You feel dizzy or lightheaded, or you feel like you may faint. · You gain 2 to 3 pounds or more over 2 days. · You have increased swelling in your legs or feet. · You have a fever. Watch closely for changes in your health, and be sure to contact your doctor if you have any problems. Where can you learn more? Go to http://pierre-shannan.info/. Randal Regalado in the search box to learn more about \"Heart Murmur: Care Instructions. \"  Current as of: January 27, 2016  Content Version: 11.2  © 6263-8381 [employer]. Care instructions adapted under license by [employer] (which disclaims liability or warranty for this information). If you have questions about a medical condition or this instruction, always ask your healthcare professional. [place] [address] any warranty or liability for your use of this information.

## 2017-06-01 NOTE — PROGRESS NOTES
1. Have you been to the ER, urgent care clinic since your last visit? Hospitalized since your last visit? Yes Where: [facility] 5/18/17 panic attack    2. Have you seen or consulted any other health care providers outside of the [address] [place] [address] since your last visit? Include any pap smears or colon screening.  No     Chief Complaint   Patient presents with    Panic Attack     ED follow up

## 2017-06-01 NOTE — PROGRESS NOTES
Chief Complaint   Patient presents with    Panic Attack     ED follow up     she is a 80y.o. year old female who presents for evalution. She had a new murmur last visit. She discussed that with her family so now she wants to address it  She is eating out every night  She has gained 12 lbs since march  She has BP cuff at home but does not check it   Reviewed PmHx, RxHx, FmHx, SocHx, AllgHx and updated and dated in the chart. Patient Active Problem List    Diagnosis    Daytime somnolence    DDD (degenerative disc disease)    Hyperlipidemia       Nurse notes were reviewed and copied and are correct  Review of Systems - negative except as listed above in the HPI    Objective:     Vitals:    06/01/17 1339   BP: 172/84   Pulse: 83   Resp: 15   Temp: 98.5 °F (36.9 °C)   TempSrc: Oral   SpO2: 92%   Weight: 157 lb (71.2 kg)   Height: 5' (1.524 m)        Physical Examination: General appearance - alert, well appearing, and in no distress and oriented to person, place, and time  Mental status - alert, oriented to person, place, and time  Chest - clear to auscultation, no wheezes, rales or rhonchi, symmetric air entry  Heart - normal rate, regular rhythm, normal S1, S2, 2/6 SE murmurs,   No rubs, clicks or gallops  Musculoskeletal - no joint tenderness, deformity or swelling  Extremities - peripheral pulses normal, no pedal edema, no clubbing or cyanosis      Assessment/ Plan:   Pool Thao was seen today for panic attack. Diagnoses and all orders for this visit:    Mixed hyperlipidemia    Murmur  -     REFERRAL TO CARDIOLOGY    Essential hypertension  -     lisinopril (PRINIVIL, ZESTRIL) 10 mg tablet; TAKE ONE TABLET BY MOUTH ONCE DAILY FOR HYPERTENSION. Follow-up Disposition:  Return in about 3 months (around 9/1/2017). ICD-10-CM ICD-9-CM    1. Mixed hyperlipidemia E78.2 272.2    2.  Murmur R01.1 785.2 REFERRAL TO CARDIOLOGY   3. Essential hypertension I10 401.9 lisinopril (PRINIVIL, ZESTRIL) 10 mg tablet I have discussed the diagnosis with the patient and the intended plan as seen in the above orders. The patient has received an after-visit summary and questions were answered concerning future plans. Medication Side Effects and Warnings were discussed with patient: yes  Patient Labs were reviewed and or requested: yes  Patient Past Records were reviewed and or requested: yes        Patient Instructions        Heart Murmur: Care Instructions  Your Care Instructions    A heart murmur is a blowing, whooshing, or rasping sound made by blood moving through the heart or the blood vessels near the heart. Murmurs can be heard through a stethoscope. Heart murmurs can occur during pregnancy or during a temporary illness, such as a fever. These murmurs usually are not a problem and go away on their own. However, sometimes a heart murmur is a sign of a serious problem, such as congenital heart disease or heart valve problems, that may need treatment. You may need more tests to check your heart. The treatment depends on the specific heart problem causing the murmur. Follow-up care is a key part of your treatment and safety. Be sure to make and go to all appointments, and call your doctor if you are having problems. It's also a good idea to know your test results and keep a list of the medicines you take. How can you care for yourself at home? · Take your medicines exactly as prescribed. Call your doctor if you think you are having a problem with your medicine. You will get more details on the specific medicines your doctor prescribes. · If your doctor recommends it, limit over-the-counter medicines that have stimulants in them. These include decongestants and cold and flu medicines. · If your doctor recommends it, get more exercise. Walking is a good choice. Bit by bit, increase the amount you walk every day. Try for 30 minutes on most days of the week.  You also may want to swim, bike, or do other activities. · Do not smoke. Smoking increases your risk of heart attack and stroke. If you need help quitting, talk to your doctor about stop-smoking programs and medicines. These can increase your chances of quitting for good. · Limit alcohol to 2 drinks a day for men and 1 drink a day for women. Alcohol may interfere with some of your medicines. When should you call for help? Call 911 anytime you think you may need emergency care. For example, call if:  · You have severe trouble breathing. · You cough up pink, foamy mucus and you have trouble breathing. · You passed out (lost consciousness). · You have a seizure. · You have symptoms of a stroke. These may include:  ¨ Sudden numbness, tingling, weakness, or loss of movement in your face, arm, or leg, especially on only one side of your body. ¨ Sudden vision changes. ¨ Sudden trouble speaking. ¨ Sudden confusion or trouble understanding simple statements. ¨ Sudden problems with walking or balance. ¨ A sudden, severe headache that is different from past headaches. Call your doctor now or seek immediate medical care if:  · You have new or increased shortness of breath. · You feel dizzy or lightheaded, or you feel like you may faint. · You gain 2 to 3 pounds or more over 2 days. · You have increased swelling in your legs or feet. · You have a fever. Watch closely for changes in your health, and be sure to contact your doctor if you have any problems. Where can you learn more? Go to http://pierre-shannan.info/. Fan Herrera in the search box to learn more about \"Heart Murmur: Care Instructions. \"  Current as of: January 27, 2016  Content Version: 11.2  © 1471-0518 [employer]. Care instructions adapted under license by [employer] (which disclaims liability or warranty for this information).  If you have questions about a medical condition or this instruction, always ask your healthcare professional. [employer] disclaims any warranty or liability for your use of this information.         The patient verbalizes understanding and agrees with the plan of care        Patient has the advanced directives booklet to review

## 2017-06-01 NOTE — MR AVS SNAPSHOT
Visit Information Date & Time Provider Department Dept. Phone Encounter #  
 6/1/2017  1:45 PM Ivan Bear MD [address]  [phone] [id number] Upcoming Health Maintenance Date Due DTaP/Tdap/Td series (1 - Tdap) 7/4/1952 ZOSTER VACCINE AGE 60> 7/4/1991 GLAUCOMA SCREENING Q2Y 7/4/1996 Pneumococcal 65+ Low/Medium Risk (1 of 2 - PCV13) 7/4/1996 INFLUENZA AGE 9 TO ADULT 8/1/2017 MEDICARE YEARLY EXAM 3/3/2018 Allergies as of 6/1/2017  Review Complete On: 6/1/2017 By: Abilio Adams LPN Severity Noted Reaction Type Reactions Egg Medium 05/08/2015   Intolerance Nausea Only Current Immunizations  Reviewed on 10/20/2015 Name Date Influenza Vaccine (Madin Misti Canine Kidney) PF 9/20/2015, 8/14/2014 Not reviewed this visit You Were Diagnosed With   
  
 Codes Comments Mixed hyperlipidemia    -  Primary ICD-10-CM: O95.9 ICD-9-CM: 272.2 Murmur     ICD-10-CM: R01.1 ICD-9-CM: 785.2 Essential hypertension     ICD-10-CM: I10 
ICD-9-CM: 401.9 Vitals BP Pulse Temp Resp Height(growth percentile) Weight(growth percentile) 172/84 83 98.5 °F (36.9 °C) (Oral) 15 5' (1.524 m) 157 lb (71.2 kg) SpO2 BMI OB Status Smoking Status 92% 30.66 kg/m2 Postmenopausal Former Smoker Vitals History BMI and BSA Data Body Mass Index Body Surface Area  
 30.66 kg/m 2 1.74 m 2 Preferred Pharmacy Pharmacy Name Phone [facility] [place], [address] [phone] Your Updated Medication List  
  
   
This list is accurate as of: 6/1/17  2:05 PM.  Always use your most recent med list.  
  
  
  
  
 ALEVE 220 mg tablet Generic drug:  naproxen sodium Take 220 mg by mouth two (2) times daily (with meals). atorvastatin 10 mg tablet Commonly known as:  LIPITOR Take 1 Tab by mouth daily. lisinopril 10 mg tablet Commonly known as:  PRINIVIL, ZESTRIL  
TAKE ONE TABLET BY MOUTH ONCE DAILY FOR HYPERTENSION. loratadine 10 mg tablet Commonly known as:  Louetta Dowell Take 10 mg by mouth daily. Omeprazole delayed release 20 mg tablet Commonly known as:  PRILOSEC D/R Take 1 Tab by mouth daily. TART CHERRY PO Take  by mouth. traMADol 50 mg tablet Commonly known as:  ULTRAM  
Take 1 Tab by mouth every eight (8) hours as needed for Pain. Max Daily Amount: 150 mg.  
  
  
  
  
Prescriptions Printed Refills  
 lisinopril (PRINIVIL, ZESTRIL) 10 mg tablet 3 Sig: TAKE ONE TABLET BY MOUTH ONCE DAILY FOR HYPERTENSION. Class: Print We Performed the Following REFERRAL TO CARDIOLOGY [GEV80 Custom] Comments:  
 New murmur, eval and treat Referral Information Referral ID Referred By Referred To  
  
 [id number] Shay Johnson MD   
   [address] [place] [address] Phone: [phone] Fax: [phone] Visits Status Start Date End Date 1 New Request 6/1/17 6/1/18 If your referral has a status of pending review or denied, additional information will be sent to support the outcome of this decision. Patient Instructions Heart Murmur: Care Instructions Your Care Instructions A heart murmur is a blowing, whooshing, or rasping sound made by blood moving through the heart or the blood vessels near the heart. Murmurs can be heard through a stethoscope. Heart murmurs can occur during pregnancy or during a temporary illness, such as a fever. These murmurs usually are not a problem and go away on their own. However, sometimes a heart murmur is a sign of a serious problem, such as congenital heart disease or heart valve problems, that may need treatment. You may need more tests to check your heart. The treatment depends on the specific heart problem causing the murmur. Follow-up care is a key part of your treatment and safety. Be sure to make and go to all appointments, and call your doctor if you are having problems. It's also a good idea to know your test results and keep a list of the medicines you take. How can you care for yourself at home? · Take your medicines exactly as prescribed. Call your doctor if you think you are having a problem with your medicine. You will get more details on the specific medicines your doctor prescribes. · If your doctor recommends it, limit over-the-counter medicines that have stimulants in them. These include decongestants and cold and flu medicines. · If your doctor recommends it, get more exercise. Walking is a good choice. Bit by bit, increase the amount you walk every day. Try for 30 minutes on most days of the week. You also may want to swim, bike, or do other activities. · Do not smoke. Smoking increases your risk of heart attack and stroke. If you need help quitting, talk to your doctor about stop-smoking programs and medicines. These can increase your chances of quitting for good. · Limit alcohol to 2 drinks a day for men and 1 drink a day for women. Alcohol may interfere with some of your medicines. When should you call for help? Call 911 anytime you think you may need emergency care. For example, call if: 
· You have severe trouble breathing. · You cough up pink, foamy mucus and you have trouble breathing. · You passed out (lost consciousness). · You have a seizure. · You have symptoms of a stroke. These may include: 
¨ Sudden numbness, tingling, weakness, or loss of movement in your face, arm, or leg, especially on only one side of your body. ¨ Sudden vision changes. ¨ Sudden trouble speaking. ¨ Sudden confusion or trouble understanding simple statements. ¨ Sudden problems with walking or balance. ¨ A sudden, severe headache that is different from past headaches. Call your doctor now or seek immediate medical care if: 
· You have new or increased shortness of breath. · You feel dizzy or lightheaded, or you feel like you may faint. · You gain 2 to 3 pounds or more over 2 days. · You have increased swelling in your legs or feet. · You have a fever. Watch closely for changes in your health, and be sure to contact your doctor if you have any problems. Where can you learn more? Go to http://pierre-shannan.info/. Rutheulalio Javier in the search box to learn more about \"Heart Murmur: Care Instructions. \" Current as of: January 27, 2016 Content Version: 11.2 © 3651-6949 [employer]. Care instructions adapted under license by [employer] (which disclaims liability or warranty for this information). If you have questions about a medical condition or this instruction, always ask your healthcare professional. [address] any warranty or liability for your use of this information. Introducing [facility] & HEALTH SERVICES! Darleen Jasso introduces Voya.ge patient portal. Now you can access parts of your medical record, email your doctor's office, and request medication refills online. 1. In your internet browser, go to [email or web address] 2. Click on the First Time User? Click Here link in the Sign In box. You will see the New Member Sign Up page. 3. Enter your Voya.ge Access Code exactly as it appears below. You will not need to use this code after youve completed the sign-up process. If you do not sign up before the expiration date, you must request a new code. · Voya.ge Access Code: [facility] Expires: 8/30/2017  2:05 PM 
 
4. Enter the last four digits of your Social Security Number (xxxx) and Date of Birth (mm/dd/yyyy) as indicated and click Submit. You will be taken to the next sign-up page. 5. Create a Voya.ge ID.  This will be your Voya.ge login ID and cannot be changed, so think of one that is secure and easy to remember. 6. Create a Dafiti password. You can change your password at any time. 7. Enter your Password Reset Question and Answer. This can be used at a later time if you forget your password. 8. Enter your e-mail address. You will receive e-mail notification when new information is available in [address] 9. Click Sign Up. You can now view and download portions of your medical record. 10. Click the Download Summary menu link to download a portable copy of your medical information. If you have questions, please visit the Frequently Asked Questions section of the Dafiti website. Remember, Dafiti is NOT to be used for urgent needs. For medical emergencies, dial 911. Now available from your iPhone and Android! Please provide this summary of care documentation to your next provider. Your primary care clinician is listed as Marycarmen Givens. If you have any questions after today's visit, please call [phone].

## 2017-07-07 ENCOUNTER — OFFICE VISIT (OUTPATIENT)
Dept: CARDIOLOGY CLINIC | Age: 82
End: 2017-07-07

## 2017-07-07 VITALS
WEIGHT: 155 LBS | BODY MASS INDEX: 30.43 KG/M2 | HEIGHT: 60 IN | SYSTOLIC BLOOD PRESSURE: 160 MMHG | HEART RATE: 76 BPM | DIASTOLIC BLOOD PRESSURE: 70 MMHG

## 2017-07-07 DIAGNOSIS — E78.2 MIXED HYPERLIPIDEMIA: ICD-10-CM

## 2017-07-07 DIAGNOSIS — R01.1 MURMUR, CARDIAC: Primary | ICD-10-CM

## 2017-07-07 RX ORDER — LABETALOL 100 MG/1
100 TABLET, FILM COATED ORAL 2 TIMES DAILY
Qty: 60 TAB | Refills: 5 | Status: SHIPPED | OUTPATIENT
Start: 2017-07-07 | End: 2017-07-18 | Stop reason: SINTOL

## 2017-07-07 NOTE — PROGRESS NOTES
Visit Vitals    /70    Pulse 76    Ht 5' (1.524 m)    Wt 155 lb (70.3 kg)    BMI 30.27 kg/m2     Medication changes for this OV VO Dr Froylan Bashir

## 2017-07-07 NOTE — MR AVS SNAPSHOT
Visit Information Date & Time Provider Department Dept. Phone Encounter #  
 7/7/2017  1:40 PM Peña Hampton MD [facility] [phone] [id number] Follow-up Instructions Return in about 6 months (around 1/7/2018). Your Appointments 7/19/2017 11:00 AM  
ECHO CARDIOGRAMS 2D with ECHO, STFRANCIS  
[facility] ([place] SCHEDULING) Appt Note: echo at 11am BP check at 11:40 Dr Trevor Elaine [address] [facility] [address]  
[phone]  
  
   
 [address] [place] [address]  
  
    
 7/19/2017 11:40 AM  
BLOOD PRESSURE with Peña Hampton MD  
[facility] ([place] SCHEDULING) Appt Note: echo at 11am BP check at 11:40 Dr Trevor Elaine [address] [place] [address]  
[phone]  
  
   
 [address] [place] [address] [address]  
  
    
 8/25/2017  1:00 PM  
ESTABLISHED PATIENT with Peña Hampton MD  
[facility] ([address]) Appt Note: 6 wk fu appt sll  
 [address] [id number] [phone]  
  
   
 [address] [address] Upcoming Health Maintenance Date Due DTaP/Tdap/Td series (1 - Tdap) 7/4/1952 ZOSTER VACCINE AGE 60> 7/4/1991 GLAUCOMA SCREENING Q2Y 7/4/1996 Pneumococcal 65+ Low/Medium Risk (1 of 2 - PCV13) 7/4/1996 INFLUENZA AGE 9 TO ADULT 8/1/2017 MEDICARE YEARLY EXAM 3/3/2018 Allergies as of 7/7/2017  Review Complete On: 7/7/2017 By: Peña Hampton MD  
  
 Severity Noted Reaction Type Reactions Egg Medium 05/08/2015   Intolerance Nausea Only Current Immunizations  Reviewed on 10/20/2015 Name Date Influenza Vaccine (Madin Misti Canine Kidney) PF 9/20/2015, 8/14/2014 Not reviewed this visit You Were Diagnosed With   
  
 Codes Comments Murmur, cardiac    -  Primary ICD-10-CM: R01.1 ICD-9-CM: 785.2 Mixed hyperlipidemia     ICD-10-CM: E78.2 ICD-9-CM: 272.2 Vitals BP Pulse Height(growth percentile) Weight(growth percentile) BMI OB Status 160/70 76 5' (1.524 m) 155 lb (70.3 kg) 30.27 kg/m2 Postmenopausal  
 Smoking Status Former Smoker Vitals History BMI and BSA Data Body Mass Index Body Surface Area  
 30.27 kg/m 2 1.73 m 2 Preferred Pharmacy Pharmacy Name Phone [facility] - [place], [address] [phone] Your Updated Medication List  
  
   
This list is accurate as of: 7/7/17  1:53 PM.  Always use your most recent med list.  
  
  
  
  
 ALEVE 220 mg tablet Generic drug:  naproxen sodium Take 220 mg by mouth two (2) times daily (with meals). atorvastatin 10 mg tablet Commonly known as:  LIPITOR Take 1 Tab by mouth daily. labetalol 100 mg tablet Commonly known as:  [place] Take 1 Tab by mouth two (2) times a day. lisinopril 10 mg tablet Commonly known as:  PRINIVIL, ZESTRIL  
TAKE ONE TABLET BY MOUTH ONCE DAILY FOR HYPERTENSION. loratadine 10 mg tablet Commonly known as:  Sydnee Badder Take 10 mg by mouth daily. Omeprazole delayed release 20 mg tablet Commonly known as:  PRILOSEC D/R Take 1 Tab by mouth daily. TART CHERRY PO Take  by mouth. Prescriptions Sent to Pharmacy Refills  
 labetalol (NORMODYNE) 100 mg tablet 5 Sig: Take 1 Tab by mouth two (2) times a day. Class: Normal  
 Pharmacy: [address] [place] [address] 58, [facility] #: [phone] Route: Oral  
  
We Performed the Following AMB POC EKG ROUTINE W/ 12 LEADS, INTER & REP [30242 CPT(R)] Follow-up Instructions Return in about 6 months (around 1/7/2018). To-Do List   
 07/07/2017   ECHO:  2D ECHO COMPLETE ADULT (TTE) W OR WO CONTR   
  
  
 Introducing [facility]! Delaney Young introduces SecondHome patient portal. Now you can access parts of your medical record, email your doctor's office, and request medication refills online. 1. In your internet browser, go to [email or web address] 2. Click on the First Time User? Click Here link in the Sign In box. You will see the New Member Sign Up page. 3. Enter your SecondHome Access Code exactly as it appears below. You will not need to use this code after youve completed the sign-up process. If you do not sign up before the expiration date, you must request a new code. · SecondHome Access Code: [facility] Expires: 8/30/2017  2:05 PM 
 
4. Enter the last four digits of your Social Security Number (xxxx) and Date of Birth (mm/dd/yyyy) as indicated and click Submit. You will be taken to the next sign-up page. 5. Create a SecondHome ID. This will be your SecondHome login ID and cannot be changed, so think of one that is secure and easy to remember. 6. Create a SecondHome password. You can change your password at any time. 7. Enter your Password Reset Question and Answer. This can be used at a later time if you forget your password. 8. Enter your e-mail address. You will receive e-mail notification when new information is available in [address] 9. Click Sign Up. You can now view and download portions of your medical record. 10. Click the Download Summary menu link to download a portable copy of your medical information. If you have questions, please visit the Frequently Asked Questions section of the SecondHome website. Remember, SecondHome is NOT to be used for urgent needs. For medical emergencies, dial 911. Now available from your iPhone and Android! Please provide this summary of care documentation to your next provider. Your primary care clinician is listed as Yulisa Wilson. If you have any questions after today's visit, please call [phone].

## 2017-07-07 NOTE — PROGRESS NOTES
LAST OFFICE VISIT : Visit date not found        ICD-10-CM ICD-9-CM   1. Murmur, cardiac R01.1 785.2   2. Mixed hyperlipidemia E78.2 272.2            Freedom Tam is a 80 y.o. female new patient referred by Dr Leslie Oneil. Cardiac risk factors: dyslipidemia, post-menopausal, hypertension. I have personally obtained the history from the patient. HISTORY OF PRESENTING ILLNESS      She states that she was told by her PCP that she had heart murmur. She states she has experienced persistent shortness of breath for most of her life. She states she quit smoking 20 years ago. She reports family history of stroke and MI in brother. She is sedentary due to leg pain specifically in ankles recently. The patient denies chest pain, orthopnea, PND, LE edema, palpitations, syncope, presyncope or fatigue.         ACTIVE PROBLEM LIST     Patient Active Problem List    Diagnosis Date Noted    Daytime somnolence 05/08/2015    DDD (degenerative disc disease) 05/08/2015    Hyperlipidemia 05/08/2015           PAST MEDICAL HISTORY     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, episode of care unspecified     Allergic rhinitis, cause unspecified     Brachial neuritis or radiculitis NOS     Cervicalgia     Disorder of bone and cartilage, unspecified     Dizziness and giddiness     Esophageal reflux     Hemorrhoid     Hypercholesterolemia     Insomnia, unspecified     Osteoarthrosis, unspecified whether generalized or localized, unspecified site     Osteoporosis, unspecified     Other abnormal blood chemistry     Other and unspecified hyperlipidemia     Pain in joint, forearm     Pain in joint, pelvic region and thigh     Pain in joint, shoulder region     Unspecified essential hypertension     Unspecified transient cerebral ischemia     Unspecified vitamin D deficiency            PAST SURGICAL HISTORY     Past Surgical History:   Procedure Laterality Date    HX BUNIONECTOMY      HX CATARACT REMOVAL Bilateral     HX CHOLECYSTECTOMY      HX PARTIAL HYSTERECTOMY      HX TUBAL LIGATION            ALLERGIES     Allergies   Allergen Reactions    Egg Nausea Only          FAMILY HISTORY     Family History   Problem Relation Age of Onset    Cancer Other     Stroke Mother     Stroke Father     Heart Disease Sister     Heart Disease Brother     Stroke Brother     negative for cardiac disease       SOCIAL HISTORY     Social History     Social History    Marital status:      Spouse name: N/A    Number of children: N/A    Years of education: N/A     Social History Main Topics    Smoking status: Former Smoker     Types: Cigarettes     Quit date: 3/3/1995    Smokeless tobacco: Never Used    Alcohol use 14.0 oz/week     28 Standard drinks or equivalent per week      Comment: Lite beer    Drug use: No    Sexual activity: No     Other Topics Concern    None     Social History Narrative         MEDICATIONS     Current Outpatient Prescriptions   Medication Sig    labetalol (NORMODYNE) 100 mg tablet Take 1 Tab by mouth two (2) times a day.  lisinopril (PRINIVIL, ZESTRIL) 10 mg tablet TAKE ONE TABLET BY MOUTH ONCE DAILY FOR HYPERTENSION.  atorvastatin (LIPITOR) 10 mg tablet Take 1 Tab by mouth daily.  Omeprazole delayed release (PRILOSEC D/R) 20 mg tablet Take 1 Tab by mouth daily.  VIT C/CHERRY & CELERY EX/GRP E (TART CHERRY PO) Take  by mouth.  loratadine (CLARITIN) 10 mg tablet Take 10 mg by mouth daily.  naproxen sodium (ALEVE) 220 mg tablet Take 220 mg by mouth two (2) times daily (with meals). No current facility-administered medications for this visit. I have reviewed the nurses notes, vitals, problem list, allergy list, medical history, family, social history and medications. REVIEW OF SYMPTOMS      General: Pt denies excessive weight gain or loss.  Pt is able to conduct ADL's  HEENT: Denies blurred vision, headaches, hearing loss, epistaxis and difficulty swallowing. Respiratory: Denies cough, congestion, shortness of breath, OVIEDO, wheezing or stridor. Cardiovascular: Denies precordial pain, palpitations, edema or PND  Gastrointestinal: Denies poor appetite, indigestion, abdominal pain or blood in stool  Genitourinary: Denies hematuria, dysuria, increased urinary frequency  Musculoskeletal: Denies joint pain or swelling from muscles or joints  Neurologic: Denies tremor, paresthesias, headache, or sensory motor disturbance  Psychiatric: Denies confusion, insomnia, depression  Integumentray: Denies rash, itching or ulcers. Hematologic: Denies easy bruising, bleeding     PHYSICAL EXAMINATION      Vitals:    07/07/17 1311   BP: 160/70   Pulse: 76   Weight: 155 lb (70.3 kg)   Height: 5' (1.524 m)     General: Well developed, in no acute distress. HEENT: No jaundice, oral mucosa moist, no oral ulcers  Neck: Supple, no stiffness, no lymphadenopathy, supple  Heart:  0/3 systolic murmur at the RUSB  Respiratory: Clear bilaterally x 4, no wheezing or rales  Abdomen:   Soft, non-tender, bowel sounds are active.   Extremities:  No edema, normal cap refill, no cyanosis. Musculoskeletal: No clubbing, no deformities  Neuro: A&Ox3, speech clear, gait stable, cooperative, no focal neurologic deficits  Skin: Skin color is normal. No rashes or lesions. Non diaphoretic, moist.          EKG: Normal sinus rhythm with LAD and poor R wave progression      DIAGNOSTIC DATA     1.  Lipids  4/7/16- , HDL 59, ,        LABORATORY DATA          No results found for: WBC, HGBPOC, HGB, HGBP, HCTPOC, HCT, PHCT, RBCH, PLT, MCV, HGBEXT, HCTEXT, PLTEXT, HGBEXT, HCTEXT, PLTEXT   Lab Results   Component Value Date/Time    Sodium 143 03/02/2017 02:38 PM    Potassium 5.1 03/02/2017 02:38 PM    Chloride 104 03/02/2017 02:38 PM    CO2 26 03/02/2017 02:38 PM    Glucose 95 03/02/2017 02:38 PM    BUN 16 03/02/2017 02:38 PM    Creatinine 0.78 03/02/2017 02:38 PM    BUN/Creatinine ratio 21 03/02/2017 02:38 PM    GFR est AA 80 03/02/2017 02:38 PM    GFR est non-AA 70 03/02/2017 02:38 PM    Calcium 9.4 03/02/2017 02:38 PM    Bilirubin, total 0.3 03/02/2017 02:38 PM    AST (SGOT) 19 03/02/2017 02:38 PM    Alk. phosphatase 113 03/02/2017 02:38 PM    Protein, total 6.6 03/02/2017 02:38 PM    Albumin 4.2 03/02/2017 02:38 PM    A-G Ratio 1.8 03/02/2017 02:38 PM    ALT (SGPT) 22 03/02/2017 02:38 PM           ASSESSMENT/RECOMMENDATIONS:.      1. Murmur    -has 1/6 systolic murmur at RUSB   -may be suggestive of pulmonic insufficiency   -favor checking echo to further evaluate this  -she does have on EKG poor R wave progression   -if there are any wall motion abnormalities on echo favor going forward with stress test    2. Dyslipidemia  -at her age lipids close to goal  -remain on current medicall regimen with no adjustments    3. HTN  -recheck 160/82    4. Follow up in 6 weeks or PRN    Orders Placed This Encounter    AMB POC EKG ROUTINE W/ 12 LEADS, INTER & REP     Order Specific Question:   Reason for Exam:     Answer:   murmur    2D ECHO COMPLETE ADULT (TTE) W OR WO CONTR     Standing Status:   Future     Standing Expiration Date:   7/25/2017     Order Specific Question:   Contrast Enhancement (Bubble Study, Definity, Optison) may be used if criteria listed in established evidence-based protocol has been identified. Answer:   Yes     Order Specific Question:   Reason for Exam:     Answer:   chest pain,shortness of breath, AVR,MVR,AI, MR , AS    labetalol (NORMODYNE) 100 mg tablet     Sig: Take 1 Tab by mouth two (2) times a day. Dispense:  60 Tab     Refill:  5        Follow-up Disposition:  Return in about 6 months (around 1/7/2018). I have discussed the diagnosis with  Deniz Estevez and the intended plan as seen in the above orders. Questions were answered concerning future plans.   I have discussed medication side effects and warnings with the patient as well. Thank you,  Jacklyn De Luna MD for involving me in the care of  Domenico Dec. Please do not hesitate to contact me for further questions/concerns. This note was written by charleen Goldberg, as dictated by Kyler Cohen MD.      Kalina Fairchild. MD [place], [facility]      [facility], [address] [place] [address] Drive      [phone] / [phone] Fax

## 2017-07-10 ENCOUNTER — TELEPHONE (OUTPATIENT)
Dept: CARDIOLOGY CLINIC | Age: 82
End: 2017-07-10

## 2017-07-10 NOTE — TELEPHONE ENCOUNTER
Patient called stating that she cannot take the labetalol. She said since taking it she has had a terrible headache and feels sicks.  Please call her at [phone]

## 2017-07-13 NOTE — TELEPHONE ENCOUNTER
Rena Akash Antoniateresa returned your call. She can be reached at [phone].      Thank you, [facility]

## 2017-07-13 NOTE — TELEPHONE ENCOUNTER
Pt given Labetalol 100mg BID at last OV and had s/e after 1st dose. Dizzy, fatigue and joints felt like \"jelly\". It lasted 12 hours. She is not willing to try a half dose since it made her feel so bad. Another med?

## 2017-07-18 RX ORDER — LISINOPRIL 20 MG/1
TABLET ORAL DAILY
COMMUNITY
End: 2017-09-29 | Stop reason: SDUPTHER

## 2017-07-19 ENCOUNTER — CLINICAL SUPPORT (OUTPATIENT)
Dept: CARDIOLOGY CLINIC | Age: 82
End: 2017-07-19

## 2017-07-19 DIAGNOSIS — R01.1 MURMUR, CARDIAC: ICD-10-CM

## 2017-07-25 ENCOUNTER — TELEPHONE (OUTPATIENT)
Dept: CARDIOLOGY CLINIC | Age: 82
End: 2017-07-25

## 2017-07-31 ENCOUNTER — TELEPHONE (OUTPATIENT)
Dept: CARDIOLOGY CLINIC | Age: 82
End: 2017-07-31

## 2017-07-31 NOTE — TELEPHONE ENCOUNTER
Pt calling to give the range for her bp of high 120 to mid 130's. She can be reached a (if needed) at [phone].  [employer]

## 2017-08-15 NOTE — TELEPHONE ENCOUNTER
Needs 30 day supply for local pharmacy since she waiting on her mail order supply     Please advise thanks

## 2017-08-16 NOTE — TELEPHONE ENCOUNTER
Patient walked in to check on the status she states \"she don't have 2 days to wait for a Rx response\"    Please advise thanks

## 2017-08-17 RX ORDER — LISINOPRIL 20 MG/1
20 TABLET ORAL DAILY
Qty: 30 TAB | Refills: 0 | OUTPATIENT
Start: 2017-08-17

## 2017-08-17 NOTE — TELEPHONE ENCOUNTER
Spoke with pt and advised that medications for her and her spouse where sent to [facility] and she stated she had received them.

## 2017-08-25 ENCOUNTER — OFFICE VISIT (OUTPATIENT)
Dept: CARDIOLOGY CLINIC | Age: 82
End: 2017-08-25

## 2017-08-25 VITALS
SYSTOLIC BLOOD PRESSURE: 180 MMHG | HEART RATE: 91 BPM | DIASTOLIC BLOOD PRESSURE: 80 MMHG | BODY MASS INDEX: 29.69 KG/M2 | WEIGHT: 152 LBS | OXYGEN SATURATION: 96 %

## 2017-08-25 DIAGNOSIS — E78.2 MIXED HYPERLIPIDEMIA: ICD-10-CM

## 2017-08-25 DIAGNOSIS — R01.1 MURMUR, CARDIAC: Primary | ICD-10-CM

## 2017-08-25 NOTE — PROGRESS NOTES
Pt has no complaints/no cardiac concerns    Pt states she has white coat syndrome    Visit Vitals    /80 (BP 1 Location: Right arm, BP Patient Position: Sitting)    Pulse 91    Wt 152 lb (68.9 kg)    SpO2 96%    BMI 29.69 kg/m2

## 2017-08-25 NOTE — PROGRESS NOTES
LAST OFFICE VISIT : 7/7/2017        ICD-10-CM ICD-9-CM   1. Murmur, cardiac R01.1 785.2   2. Mixed hyperlipidemia E78.2 272.2            Key Jernigan is a 80 y.o. female with dyslipidemia and hypertension referred for 6 week follow up. Cardiac risk factors: dyslipidemia, post-menopausal, hypertension. I have personally obtained the history from the patient. HISTORY OF PRESENTING ILLNESS      She is doing well with no cardiac complaints today. She states her BP is controlled at home in the 130's. She states she has long history of white coat hypertension. The patient denies chest pain/ shortness of breath, orthopnea, PND, LE edema, palpitations, syncope, presyncope or fatigue.         ACTIVE PROBLEM LIST     Patient Active Problem List    Diagnosis Date Noted    Daytime somnolence 05/08/2015    DDD (degenerative disc disease) 05/08/2015    Hyperlipidemia 05/08/2015           PAST MEDICAL HISTORY     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, episode of care unspecified     Allergic rhinitis, cause unspecified     Brachial neuritis or radiculitis NOS     Cervicalgia     Disorder of bone and cartilage, unspecified     Dizziness and giddiness     Esophageal reflux     Hemorrhoid     Hypercholesterolemia     Insomnia, unspecified     Osteoarthrosis, unspecified whether generalized or localized, unspecified site     Osteoporosis, unspecified     Other abnormal blood chemistry     Other and unspecified hyperlipidemia     Pain in joint, forearm     Pain in joint, pelvic region and thigh     Pain in joint, shoulder region     Unspecified essential hypertension     Unspecified transient cerebral ischemia     Unspecified vitamin D deficiency            PAST SURGICAL HISTORY     Past Surgical History:   Procedure Laterality Date    HX BUNIONECTOMY      HX CATARACT REMOVAL Bilateral     HX CHOLECYSTECTOMY      HX PARTIAL HYSTERECTOMY      HX TUBAL LIGATION            ALLERGIES     Allergies   Allergen Reactions    Egg Nausea Only          FAMILY HISTORY     Family History   Problem Relation Age of Onset    Cancer Other     Stroke Mother     Stroke Father     Heart Disease Sister     Heart Disease Brother     Stroke Brother     negative for cardiac disease       SOCIAL HISTORY     Social History     Social History    Marital status:      Spouse name: N/A    Number of children: N/A    Years of education: N/A     Social History Main Topics    Smoking status: Former Smoker     Types: Cigarettes     Quit date: 3/3/1995    Smokeless tobacco: Never Used    Alcohol use 14.0 oz/week     28 Standard drinks or equivalent per week      Comment: beer and wine occ    Drug use: No    Sexual activity: No     Other Topics Concern    None     Social History Narrative         MEDICATIONS     Current Outpatient Prescriptions   Medication Sig    lisinopril (PRINIVIL, ZESTRIL) 20 mg tablet Take  by mouth daily. Indications: pt says she believes she takes 5mg tablets    atorvastatin (LIPITOR) 10 mg tablet Take 1 Tab by mouth daily.  Omeprazole delayed release (PRILOSEC D/R) 20 mg tablet Take 1 Tab by mouth daily.  VIT C/CHERRY & CELERY EX/GRP E (TART CHERRY PO) Take  by mouth.  loratadine (CLARITIN) 10 mg tablet Take 10 mg by mouth daily.  naproxen sodium (ALEVE) 220 mg tablet Take 220 mg by mouth daily. No current facility-administered medications for this visit. I have reviewed the nurses notes, vitals, problem list, allergy list, medical history, family, social history and medications. REVIEW OF SYMPTOMS      General: Pt denies excessive weight gain or loss. Pt is able to conduct ADL's  HEENT: Denies blurred vision, headaches, hearing loss, epistaxis and difficulty swallowing. Respiratory: Denies cough, congestion, shortness of breath, OIVEDO, wheezing or stridor.   Cardiovascular: Denies precordial pain, palpitations, edema or PND  Gastrointestinal: Denies poor appetite, indigestion, abdominal pain or blood in stool  Genitourinary: Denies hematuria, dysuria, increased urinary frequency  Musculoskeletal: Denies joint pain or swelling from muscles or joints  Neurologic: Denies tremor, paresthesias, headache, or sensory motor disturbance  Psychiatric: Denies confusion, insomnia, depression  Integumentray: Denies rash, itching or ulcers. Hematologic: Denies easy bruising, bleeding     PHYSICAL EXAMINATION      Vitals:    08/25/17 1300   BP: 180/80   Pulse: 91   SpO2: 96%   Weight: 152 lb (68.9 kg)     General: Well developed, in no acute distress. HEENT: No jaundice, oral mucosa moist, no oral ulcers  Neck: Supple, no stiffness, no lymphadenopathy, supple  Heart:  Normal S1/S2 negative S3 or S4. Regular, 2/6 systolic murmur at the RUSB, gallop or rub, no jugular venous distention  Respiratory: Clear bilaterally x 4, no wheezing or rales   Extremities:  No edema, normal cap refill, no cyanosis. Musculoskeletal: No clubbing, no deformities  Neuro: A&Ox3, speech clear, gait stable, cooperative, no focal neurologic deficits  Skin: Skin color is normal. No rashes or lesions. Non diaphoretic, moist.             DIAGNOSTIC DATA     1. Lipids  4/7/16- , HDL 59, ,     2.  Echo  7/19/17- EF 60-65%, LAE mild, MV mild annular calcification, TR mild       LABORATORY DATA          No results found for: WBC, HGBPOC, HGB, HGBP, HCTPOC, HCT, PHCT, RBCH, PLT, MCV, HGBEXT, HCTEXT, PLTEXT, HGBEXT, HCTEXT, PLTEXT   Lab Results   Component Value Date/Time    Sodium 143 03/02/2017 02:38 PM    Potassium 5.1 03/02/2017 02:38 PM    Chloride 104 03/02/2017 02:38 PM    CO2 26 03/02/2017 02:38 PM    Glucose 95 03/02/2017 02:38 PM    BUN 16 03/02/2017 02:38 PM    Creatinine 0.78 03/02/2017 02:38 PM    BUN/Creatinine ratio 21 03/02/2017 02:38 PM    GFR est AA 80 03/02/2017 02:38 PM    GFR est non-AA 70 03/02/2017 02:38 PM Calcium 9.4 03/02/2017 02:38 PM    Bilirubin, total 0.3 03/02/2017 02:38 PM    AST (SGOT) 19 03/02/2017 02:38 PM    Alk. phosphatase 113 03/02/2017 02:38 PM    Protein, total 6.6 03/02/2017 02:38 PM    Albumin 4.2 03/02/2017 02:38 PM    A-G Ratio 1.8 03/02/2017 02:38 PM    ALT (SGPT) 22 03/02/2017 02:38 PM           ASSESSMENT/RECOMMENDATIONS:.      1. Dyslipidemia  -fine with her discontinuing her cholesterol medication at this time       2. Diabetes mellitus   -hemoglobin A1C goal approximately 6    3. Hypertension  -she refuses to have change in antihypertensive medication  -she understands the risks of untreated hypertension     4. Murmur   -does have MR and TR  -ideally it would be best to control her BP with this but she is not interested in changes      5. Follow up in 1 year or PRN    No orders of the defined types were placed in this encounter. Follow-up Disposition:  Return in about 1 year (around 8/25/2018). I have discussed the diagnosis with  Key Jernigan and the intended plan as seen in the above orders. Questions were answered concerning future plans. I have discussed medication side effects and warnings with the patient as well. Thank you,  Sari Rojas MD for involving me in the care of  Key Jernigan. Please do not hesitate to contact me for further questions/concerns. This note was written by charleen Ramirez, as dictated by Afshan Merida MD.      Sharee Verde. MD [place] [address]      [facility], [address] [place] [address] Drive      [phone] / [phone] Fax

## 2017-09-23 ENCOUNTER — HOSPITAL ENCOUNTER (EMERGENCY)
Age: 82
Discharge: HOME | End: 2017-09-23
Attending: EMERGENCY MEDICINE
Payer: MEDICARE

## 2017-09-23 ENCOUNTER — APPOINTMENT (OUTPATIENT)
Dept: CT IMAGING | Age: 82
End: 2017-09-23
Attending: EMERGENCY MEDICINE
Payer: MEDICARE

## 2017-09-23 VITALS
OXYGEN SATURATION: 93 % | RESPIRATION RATE: 18 BRPM | TEMPERATURE: 98.1 F | SYSTOLIC BLOOD PRESSURE: 127 MMHG | BODY MASS INDEX: 29.45 KG/M2 | HEART RATE: 84 BPM | HEIGHT: 60 IN | DIASTOLIC BLOOD PRESSURE: 114 MMHG | WEIGHT: 150 LBS

## 2017-09-23 DIAGNOSIS — I10 ESSENTIAL HYPERTENSION: ICD-10-CM

## 2017-09-23 DIAGNOSIS — N39.0 URINARY TRACT INFECTION WITHOUT HEMATURIA, SITE UNSPECIFIED: Primary | ICD-10-CM

## 2017-09-23 LAB
ALBUMIN SERPL-MCNC: 3.8 G/DL (ref 3.5–5)
ALBUMIN/GLOB SERPL: 1.1 {RATIO} (ref 1.1–2.2)
ALP SERPL-CCNC: 103 U/L (ref 45–117)
ALT SERPL-CCNC: 27 U/L (ref 12–78)
ANION GAP SERPL CALC-SCNC: 10 MMOL/L (ref 5–15)
APPEARANCE UR: ABNORMAL
AST SERPL-CCNC: 25 U/L (ref 15–37)
BACTERIA URNS QL MICRO: ABNORMAL /HPF
BASOPHILS # BLD: 0.1 K/UL (ref 0–0.1)
BASOPHILS NFR BLD: 1 % (ref 0–1)
BILIRUB SERPL-MCNC: 0.5 MG/DL (ref 0.2–1)
BILIRUB UR QL: NEGATIVE
BUN SERPL-MCNC: 13 MG/DL (ref 6–20)
BUN/CREAT SERPL: 16 (ref 12–20)
CALCIUM SERPL-MCNC: 9 MG/DL (ref 8.5–10.1)
CHLORIDE SERPL-SCNC: 107 MMOL/L (ref 97–108)
CO2 SERPL-SCNC: 25 MMOL/L (ref 21–32)
COLOR UR: ABNORMAL
CREAT SERPL-MCNC: 0.79 MG/DL (ref 0.55–1.02)
EOSINOPHIL # BLD: 0.4 K/UL (ref 0–0.4)
EOSINOPHIL NFR BLD: 3 % (ref 0–7)
EPITH CASTS URNS QL MICRO: ABNORMAL /LPF
ERYTHROCYTE [DISTWIDTH] IN BLOOD BY AUTOMATED COUNT: 13.3 % (ref 11.5–14.5)
GLOBULIN SER CALC-MCNC: 3.5 G/DL (ref 2–4)
GLUCOSE BLD STRIP.AUTO-MCNC: 113 MG/DL (ref 65–100)
GLUCOSE BLD STRIP.AUTO-MCNC: 92 MG/DL (ref 65–100)
GLUCOSE SERPL-MCNC: 101 MG/DL (ref 65–100)
GLUCOSE UR STRIP.AUTO-MCNC: NEGATIVE MG/DL
HCT VFR BLD AUTO: 44.7 % (ref 35–47)
HGB BLD-MCNC: 14.9 G/DL (ref 11.5–16)
HGB UR QL STRIP: ABNORMAL
HYALINE CASTS URNS QL MICRO: ABNORMAL /LPF (ref 0–5)
KETONES UR QL STRIP.AUTO: NEGATIVE MG/DL
LEUKOCYTE ESTERASE UR QL STRIP.AUTO: ABNORMAL
LYMPHOCYTES # BLD: 2.5 K/UL (ref 0.8–3.5)
LYMPHOCYTES NFR BLD: 24 % (ref 12–49)
MCH RBC QN AUTO: 31.2 PG (ref 26–34)
MCHC RBC AUTO-ENTMCNC: 33.3 G/DL (ref 30–36.5)
MCV RBC AUTO: 93.7 FL (ref 80–99)
MONOCYTES # BLD: 0.7 K/UL (ref 0–1)
MONOCYTES NFR BLD: 7 % (ref 5–13)
NEUTS SEG # BLD: 6.8 K/UL (ref 1.8–8)
NEUTS SEG NFR BLD: 65 % (ref 32–75)
NITRITE UR QL STRIP.AUTO: POSITIVE
PH UR STRIP: 6.5 [PH] (ref 5–8)
PLATELET # BLD AUTO: 268 K/UL (ref 150–400)
POTASSIUM SERPL-SCNC: 4.7 MMOL/L (ref 3.5–5.1)
PROT SERPL-MCNC: 7.3 G/DL (ref 6.4–8.2)
PROT UR STRIP-MCNC: NEGATIVE MG/DL
RBC # BLD AUTO: 4.77 M/UL (ref 3.8–5.2)
RBC #/AREA URNS HPF: ABNORMAL /HPF (ref 0–5)
SERVICE CMNT-IMP: ABNORMAL
SERVICE CMNT-IMP: NORMAL
SODIUM SERPL-SCNC: 142 MMOL/L (ref 136–145)
SP GR UR REFRACTOMETRY: 1.01 (ref 1–1.03)
UR CULT HOLD, URHOLD: NORMAL
UROBILINOGEN UR QL STRIP.AUTO: 0.2 EU/DL (ref 0.2–1)
WBC # BLD AUTO: 10.4 K/UL (ref 3.6–11)
WBC URNS QL MICRO: >100 /HPF (ref 0–4)

## 2017-09-23 PROCEDURE — 96375 TX/PRO/DX INJ NEW DRUG ADDON: CPT

## 2017-09-23 PROCEDURE — 70450 CT HEAD/BRAIN W/O DYE: CPT

## 2017-09-23 PROCEDURE — 85025 COMPLETE CBC W/AUTO DIFF WBC: CPT | Performed by: EMERGENCY MEDICINE

## 2017-09-23 PROCEDURE — 36415 COLL VENOUS BLD VENIPUNCTURE: CPT | Performed by: EMERGENCY MEDICINE

## 2017-09-23 PROCEDURE — 87186 SC STD MICRODIL/AGAR DIL: CPT | Performed by: EMERGENCY MEDICINE

## 2017-09-23 PROCEDURE — 74011000258 HC RX REV CODE- 258: Performed by: EMERGENCY MEDICINE

## 2017-09-23 PROCEDURE — 96365 THER/PROPH/DIAG IV INF INIT: CPT

## 2017-09-23 PROCEDURE — 80053 COMPREHEN METABOLIC PANEL: CPT | Performed by: EMERGENCY MEDICINE

## 2017-09-23 PROCEDURE — 81001 URINALYSIS AUTO W/SCOPE: CPT | Performed by: EMERGENCY MEDICINE

## 2017-09-23 PROCEDURE — 82962 GLUCOSE BLOOD TEST: CPT

## 2017-09-23 PROCEDURE — 87086 URINE CULTURE/COLONY COUNT: CPT | Performed by: EMERGENCY MEDICINE

## 2017-09-23 PROCEDURE — 87077 CULTURE AEROBIC IDENTIFY: CPT | Performed by: EMERGENCY MEDICINE

## 2017-09-23 PROCEDURE — 93005 ELECTROCARDIOGRAM TRACING: CPT

## 2017-09-23 PROCEDURE — 99285 EMERGENCY DEPT VISIT HI MDM: CPT

## 2017-09-23 PROCEDURE — 74011250636 HC RX REV CODE- 250/636: Performed by: EMERGENCY MEDICINE

## 2017-09-23 RX ORDER — HYDRALAZINE HYDROCHLORIDE 20 MG/ML
20 INJECTION INTRAMUSCULAR; INTRAVENOUS
Status: COMPLETED | OUTPATIENT
Start: 2017-09-23 | End: 2017-09-23

## 2017-09-23 RX ORDER — CEPHALEXIN 500 MG/1
500 CAPSULE ORAL 2 TIMES DAILY
Qty: 14 CAP | Refills: 0 | Status: SHIPPED | OUTPATIENT
Start: 2017-09-23 | End: 2017-09-30

## 2017-09-23 RX ADMIN — CEFTRIAXONE SODIUM 1 G: 1 INJECTION, POWDER, FOR SOLUTION INTRAMUSCULAR; INTRAVENOUS at 14:20

## 2017-09-23 RX ADMIN — HYDRALAZINE HYDROCHLORIDE 20 MG: 20 INJECTION INTRAMUSCULAR; INTRAVENOUS at 14:10

## 2017-09-23 NOTE — ED PROVIDER NOTES
HPI Comments: 80 y.o. female with past medical history significant for hemorrhoid, hypercholesteremia, pain in joint of forearm, cervicalgia, allergic rhinitis, osteoarthrosis, esophageal reflux, aucte MI, brachial neuritis of radiculitis, osteoporosis, transient cerebral ischemia, vitamin D deficiency, essential HTN, insomnia and dizziness who presents from home via EMS with chief complaint of weakness. Per pt she experienced onset of, \"wave like sensation from my head down to my toes\" this afternoon while on the phone with her daughter. The pt reports experiencing generally weak and fatigued along with her wave like sensation this evening. Per pt, her discomfort has been predominant to her head region and associated with a lightheaded sensation that makes her feel near syncopal. Per pt, her wave like sensation has appeared worsened PTA with increased generalized weakness/fatigue. The pt reports that she has no known thyroid related issues and that she is not on blood thinners currently. She denies fever, chills, N/V/D, CP, SOB, abd pain, diaphoresis, constipation, LE swelling, rhinorrhea, sore throat, rash and urinary symptoms. There are no other acute medical concerns at this time. Social hx: Former smoker, Current ETOH use   PCP: Megan Weeks MD    Note written by Jamie Beauchamp, as dictated by Selin Serrano. Franck Bowie MD 1:01 PM          The history is provided by the patient. No  was used.         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, episode of care unspecified     Allergic rhinitis, cause unspecified     Brachial neuritis or radiculitis NOS     Cervicalgia     Disorder of bone and cartilage, unspecified     Dizziness and giddiness     Esophageal reflux     Hemorrhoid     Hypercholesterolemia     Insomnia, unspecified     Osteoarthrosis, unspecified whether generalized or localized, unspecified site     Osteoporosis, unspecified     Other abnormal blood chemistry     Other and unspecified hyperlipidemia     Pain in joint, forearm     Pain in joint, pelvic region and thigh     Pain in joint, shoulder region     Unspecified essential hypertension     Unspecified transient cerebral ischemia     Unspecified vitamin D deficiency        Past Surgical History:   Procedure Laterality Date    HX BUNIONECTOMY      HX CATARACT REMOVAL Bilateral     HX CHOLECYSTECTOMY      HX PARTIAL HYSTERECTOMY      HX TUBAL LIGATION           Family History:   Problem Relation Age of Onset    Cancer Other     Stroke Mother     Stroke Father     Heart Disease Sister     Heart Disease Brother     Stroke Brother        Social History     Social History    Marital status:      Spouse name: N/A    Number of children: N/A    Years of education: N/A     Occupational History    Not on file. Social History Main Topics    Smoking status: Former Smoker     Types: Cigarettes     Quit date: 3/3/1995    Smokeless tobacco: Never Used    Alcohol use 14.0 oz/week     28 Standard drinks or equivalent per week      Comment: beer and wine occ    Drug use: No    Sexual activity: No     Other Topics Concern    Not on file     Social History Narrative         ALLERGIES: Egg    Review of Systems   Constitutional: Positive for fatigue. Negative for chills, diaphoresis and fever. HENT: Negative. Negative for ear pain, rhinorrhea and sore throat. Eyes: Negative. Negative for pain. Respiratory: Negative. Negative for cough, chest tightness and shortness of breath. Cardiovascular: Negative. Negative for chest pain and leg swelling. Gastrointestinal: Negative. Negative for abdominal pain, nausea and vomiting. Endocrine: Negative. Genitourinary: Negative. Negative for dysuria and flank pain. Musculoskeletal: Negative. Negative for back pain. Skin: Negative. Negative for rash.    Allergic/Immunologic: Negative. Neurological: Positive for weakness ( generallized throughout the day today (9/23/2017)) and light-headedness ( moderate and associated with wave like sensation throughout her body (worse to head). ). Negative for headaches. Hematological: Negative. Psychiatric/Behavioral: Negative. All other systems reviewed and are negative. Vitals:    09/23/17 1232 09/23/17 1238 09/23/17 1245 09/23/17 1300   BP: (!) 243/85 (!) 201/89 166/74 (!) 240/99   Pulse: 87 89     Resp: 18 18     Temp: 98.1 °F (36.7 °C)      SpO2: 94% 97% 95% 92%   Weight: 68 kg (150 lb)      Height: 5' (1.524 m)               Physical Exam   Constitutional: She is oriented to person, place, and time. She appears well-developed and well-nourished. HENT:   Head: Normocephalic and atraumatic. Mouth/Throat: Oropharynx is clear and moist.   Eyes: Conjunctivae and EOM are normal. Pupils are equal, round, and reactive to light. No scleral icterus. Neck: Neck supple. No tracheal deviation present. Cardiovascular: Normal rate, regular rhythm, normal heart sounds and intact distal pulses. Pulmonary/Chest: Effort normal and breath sounds normal. No respiratory distress. Abdominal: Soft. She exhibits no distension. There is no tenderness. There is no rebound and no guarding. Genitourinary:   Genitourinary Comments: deferred   Musculoskeletal: She exhibits no edema. Neurological: She is alert and oriented to person, place, and time. Normal finger to nose test, no cranial nerve deficits. Motor sensation normal and equal to both LE/UE. No pronator drift. Negative Romberg's test.    Skin: Skin is warm and dry. Psychiatric: She has a normal mood and affect. Nursing note and vitals reviewed. Note written by Jamie Arango, as dictated by Bereket Shepherd.  Aliyah Oconnor MD 1:01 PM    MDM  Number of Diagnoses or Management Options  Essential hypertension:   Urinary tract infection without hematuria, site unspecified: Diagnosis management comments:       ED Course       Procedures      2:16 PM  The patient has been reevaluated. The patient is ready for discharge. The patient's signs, symptoms, diagnosis, and discharge instructions have been discussed and the patient/ family has conveyed their understanding. The patient is to follow up as recommended or return to the ED should their symptoms worsen. Plan has been discussed and the patient is in agreement. LABORATORY TESTS:  Recent Results (from the past 12 hour(s))   GLUCOSE, POC    Collection Time: 09/23/17  1:05 PM   Result Value Ref Range    Glucose (POC) 92 65 - 100 mg/dL    Performed by Josette Haji    CBC WITH AUTOMATED DIFF    Collection Time: 09/23/17  1:10 PM   Result Value Ref Range    WBC 10.4 3.6 - 11.0 K/uL    RBC 4.77 3.80 - 5.20 M/uL    HGB 14.9 11.5 - 16.0 g/dL    HCT 44.7 35.0 - 47.0 %    MCV 93.7 80.0 - 99.0 FL    MCH 31.2 26.0 - 34.0 PG    MCHC 33.3 30.0 - 36.5 g/dL    RDW 13.3 11.5 - 14.5 %    PLATELET 831 291 - 720 K/uL    NEUTROPHILS 65 32 - 75 %    LYMPHOCYTES 24 12 - 49 %    MONOCYTES 7 5 - 13 %    EOSINOPHILS 3 0 - 7 %    BASOPHILS 1 0 - 1 %    ABS. NEUTROPHILS 6.8 1.8 - 8.0 K/UL    ABS. LYMPHOCYTES 2.5 0.8 - 3.5 K/UL    ABS. MONOCYTES 0.7 0.0 - 1.0 K/UL    ABS. EOSINOPHILS 0.4 0.0 - 0.4 K/UL    ABS. BASOPHILS 0.1 0.0 - 0.1 K/UL   METABOLIC PANEL, COMPREHENSIVE    Collection Time: 09/23/17  1:10 PM   Result Value Ref Range    Sodium 142 136 - 145 mmol/L    Potassium 4.7 3.5 - 5.1 mmol/L    Chloride 107 97 - 108 mmol/L    CO2 25 21 - 32 mmol/L    Anion gap 10 5 - 15 mmol/L    Glucose 101 (H) 65 - 100 mg/dL    BUN 13 6 - 20 MG/DL    Creatinine 0.79 0.55 - 1.02 MG/DL    BUN/Creatinine ratio 16 12 - 20      GFR est AA >60 >60 ml/min/1.73m2    GFR est non-AA >60 >60 ml/min/1.73m2    Calcium 9.0 8.5 - 10.1 MG/DL    Bilirubin, total 0.5 0.2 - 1.0 MG/DL    ALT (SGPT) 27 12 - 78 U/L    AST (SGOT) 25 15 - 37 U/L    Alk.  phosphatase 103 45 - 117 U/L Protein, total 7.3 6.4 - 8.2 g/dL    Albumin 3.8 3.5 - 5.0 g/dL    Globulin 3.5 2.0 - 4.0 g/dL    A-G Ratio 1.1 1.1 - 2.2     URINALYSIS W/MICROSCOPIC    Collection Time: 09/23/17  1:22 PM   Result Value Ref Range    Color YELLOW/STRAW      Appearance CLOUDY (A) CLEAR      Specific gravity 1.012 1.003 - 1.030      pH (UA) 6.5 5.0 - 8.0      Protein NEGATIVE  NEG mg/dL    Glucose NEGATIVE  NEG mg/dL    Ketone NEGATIVE  NEG mg/dL    Bilirubin NEGATIVE  NEG      Blood SMALL (A) NEG      Urobilinogen 0.2 0.2 - 1.0 EU/dL    Nitrites POSITIVE (A) NEG      Leukocyte Esterase LARGE (A) NEG      WBC >100 (H) 0 - 4 /hpf    RBC 5-10 0 - 5 /hpf    Epithelial cells FEW FEW /lpf    Bacteria 4+ (A) NEG /hpf    Hyaline cast 0-2 0 - 5 /lpf   URINE CULTURE HOLD SAMPLE    Collection Time: 09/23/17  1:22 PM   Result Value Ref Range    Urine culture hold URINE ON HOLD IN MICROBIOLOGY DEPT FOR 3 DAYS         IMAGING RESULTS:  CT HEAD WO CONT   Final Result        Ct Head Wo Cont    Result Date: 9/23/2017  EXAM:  CT HEAD WO CONT INDICATION: Abnormal body paresthesias earlier today. Orthostatic dizziness. COMPARISON: None TECHNIQUE: Noncontrast head CT. Coronal and sagittal reformats. CT dose reduction was achieved through the use of a standardized protocol tailored for this examination and automatic exposure control for dose modulation. FINDINGS: The ventricles and sulci are age-appropriate without hydrocephalus. There is no mass effect or midline shift. There is no intracranial hemorrhage or extra-axial fluid collection. Subtle hypoattenuation in the cerebral periventricular white matter. Small foci of hypoattenuation in the bilateral putamen. The calvarium is intact. The visualized paranasal sinuses and mastoid air cells are clear. There are vascular calcifications. 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 No acute intracranial hemorrhage or mass effect.          MEDICATIONS GIVEN:  Medications   cefTRIAXone (ROCEPHIN) 1 g in 0.9% sodium chloride (MBP/ADV) 50 mL (not administered)   hydrALAZINE (APRESOLINE) 20 mg/mL injection 20 mg (not administered)       IMPRESSION:  1. Urinary tract infection without hematuria, site unspecified    2. Essential hypertension        PLAN:  1. Current Discharge Medication List      START taking these medications    Details   cephALEXin (KEFLEX) 500 mg capsule Take 1 Cap by mouth two (2) times a day for 7 days. Qty: 14 Cap, Refills: 0           2. Follow-up Information     Follow up With Details Comments Contact Info    Megan Weeks MD Call in 2 days  [address]      [facility] EMERGENCY DEPT  If symptoms worsen [address] [facility]  [phone]          Return to ED for new or worsening symptoms       Selin Serrano.  Franck Bowie MD

## 2017-09-23 NOTE — ED NOTES
Assisted pt to bedside commode. Pt dizzy upon standing due to recent medication administration. Pt educated on orthostatic hypotension due to medication.

## 2017-09-23 NOTE — ED NOTES
Called into pt room pt reports headache and numbness in left index finger, left thumb, and toe on right foot. MD aware- orders received.

## 2017-09-23 NOTE — DISCHARGE INSTRUCTIONS
High Blood Pressure: Care Instructions  Your Care Instructions  If your blood pressure is usually above 140/90, you have high blood pressure, or hypertension. That means the top number is 140 or higher or the bottom number is 90 or higher, or both. Despite what a lot of people think, high blood pressure usually doesn't cause headaches or make you feel dizzy or lightheaded. It usually has no symptoms. But it does increase your risk for heart attack, stroke, and kidney or eye damage. The higher your blood pressure, the more your risk increases. Your doctor will give you a goal for your blood pressure. Your goal will be based on your health and your age. An example of a goal is to keep your blood pressure below 140/90. Lifestyle changes, such as eating healthy and being active, are always important to help lower blood pressure. You might also take medicine to reach your blood pressure goal.  Follow-up care is a key part of your treatment and safety. Be sure to make and go to all appointments, and call your doctor if you are having problems. It's also a good idea to know your test results and keep a list of the medicines you take. How can you care for yourself at home? Medical treatment  · If you stop taking your medicine, your blood pressure will go back up. You may take one or more types of medicine to lower your blood pressure. Be safe with medicines. Take your medicine exactly as prescribed. Call your doctor if you think you are having a problem with your medicine. · Talk to your doctor before you start taking aspirin every day. Aspirin can help certain people lower their risk of a heart attack or stroke. But taking aspirin isn't right for everyone, because it can cause serious bleeding. · See your doctor regularly. You may need to see the doctor more often at first or until your blood pressure comes down.   · If you are taking blood pressure medicine, talk to your doctor before you take decongestants or anti-inflammatory medicine, such as ibuprofen. Some of these medicines can raise blood pressure. · Learn how to check your blood pressure at home. Lifestyle changes  · Stay at a healthy weight. This is especially important if you put on weight around the waist. Losing even 10 pounds can help you lower your blood pressure. · If your doctor recommends it, get more exercise. Walking is a good choice. Bit by bit, increase the amount you walk every day. Try for at least 30 minutes on most days of the week. You also may want to swim, bike, or do other activities. · Avoid or limit alcohol. Talk to your doctor about whether you can drink any alcohol. · Try to limit how much sodium you eat to less than 2,300 milligrams (mg) a day. Your doctor may ask you to try to eat less than 1,500 mg a day. · Eat plenty of fruits (such as bananas and oranges), vegetables, legumes, whole grains, and low-fat dairy products. · Lower the amount of saturated fat in your diet. Saturated fat is found in animal products such as milk, cheese, and meat. Limiting these foods may help you lose weight and also lower your risk for heart disease. · Do not smoke. Smoking increases your risk for heart attack and stroke. If you need help quitting, talk to your doctor about stop-smoking programs and medicines. These can increase your chances of quitting for good. When should you call for help? Call 911 anytime you think you may need emergency care. This may mean having symptoms that suggest that your blood pressure is causing a serious heart or blood vessel problem. Your blood pressure may be over 180/110. For example, call 911 if:  · You have symptoms of a heart attack. These may include:  ¨ Chest pain or pressure, or a strange feeling in the chest.  ¨ Sweating. ¨ Shortness of breath. ¨ Nausea or vomiting. ¨ Pain, pressure, or a strange feeling in the back, neck, jaw, or upper belly or in one or both shoulders or arms.   ¨ Lightheadedness or sudden weakness. ¨ A fast or irregular heartbeat. · You have symptoms of a stroke. These may include:  ¨ Sudden numbness, tingling, weakness, or loss of movement in your face, arm, or leg, especially on only one side of your body. ¨ Sudden vision changes. ¨ Sudden trouble speaking. ¨ Sudden confusion or trouble understanding simple statements. ¨ Sudden problems with walking or balance. ¨ A sudden, severe headache that is different from past headaches. · You have severe back or belly pain. Do not wait until your blood pressure comes down on its own. Get help right away. Call your doctor now or seek immediate care if:  · Your blood pressure is much higher than normal (such as 180/110 or higher), but you don't have symptoms. · You think high blood pressure is causing symptoms, such as:  ¨ Severe headache. ¨ Blurry vision. Watch closely for changes in your health, and be sure to contact your doctor if:  · Your blood pressure measures 140/90 or higher at least 2 times. That means the top number is 140 or higher or the bottom number is 90 or higher, or both. · You think you may be having side effects from your blood pressure medicine. · Your blood pressure is usually normal, but it goes above normal at least 2 times. Where can you learn more? Go to http://pierre-shannan.info/. Enter K550 in the search box to learn more about \"High Blood Pressure: Care Instructions. \"  Current as of: August 8, 2016  Content Version: 11.3  © 3346-6173 [employer]. Care instructions adapted under license by [employer] (which disclaims liability or warranty for this information). If you have questions about a medical condition or this instruction, always ask your healthcare professional. [place] [address] any warranty or liability for your use of this information.          Urinary Tract Infection in Women: Care Instructions  Your Care Instructions    A urinary tract infection, or UTI, is a general term for an infection anywhere between the kidneys and the urethra (where urine comes out). Most UTIs are bladder infections. They often cause pain or burning when you urinate. UTIs are caused by bacteria and can be cured with antibiotics. Be sure to complete your treatment so that the infection goes away. Follow-up care is a key part of your treatment and safety. Be sure to make and go to all appointments, and call your doctor if you are having problems. It's also a good idea to know your test results and keep a list of the medicines you take. How can you care for yourself at home? · Take your antibiotics as directed. Do not stop taking them just because you feel better. You need to take the full course of antibiotics. · Drink extra water and other fluids for the next day or two. This may help wash out the bacteria that are causing the infection. (If you have kidney, heart, or liver disease and have to limit fluids, talk with your doctor before you increase your fluid intake.)  · Avoid drinks that are carbonated or have caffeine. They can irritate the bladder. · Urinate often. Try to empty your bladder each time. · To relieve pain, take a hot bath or lay a heating pad set on low over your lower belly or genital area. Never go to sleep with a heating pad in place. To prevent UTIs  · Drink plenty of water each day. This helps you urinate often, which clears bacteria from your system. (If you have kidney, heart, or liver disease and have to limit fluids, talk with your doctor before you increase your fluid intake.)  · Urinate when you need to. · Urinate right after you have sex. · Change sanitary pads often. · Avoid douches, bubble baths, feminine hygiene sprays, and other feminine hygiene products that have deodorants. · After going to the bathroom, wipe from front to back. When should you call for help?   Call your doctor now or seek immediate medical care if:  · Symptoms such as fever, chills, nausea, or vomiting get worse or appear for the first time. · You have new pain in your back just below your rib cage. This is called flank pain. · There is new blood or pus in your urine. · You have any problems with your antibiotic medicine. Watch closely for changes in your health, and be sure to contact your doctor if:  · You are not getting better after taking an antibiotic for 2 days. · Your symptoms go away but then come back. Where can you learn more? Go to http://pierre-shannan.info/. Enter N360 in the search box to learn more about \"Urinary Tract Infection in Women: Care Instructions. \"  Current as of: November 28, 2016  Content Version: 11.3  © 3191-4302 [employer]. Care instructions adapted under license by [employer] (which disclaims liability or warranty for this information). If you have questions about a medical condition or this instruction, always ask your healthcare professional. [place] [address] any warranty or liability for your use of this information. We hope that we have addressed all of your medical concerns. The examination and treatment you received in the Emergency Department were for an emergent problem and were not intended as complete care. It is important that you follow up with your healthcare provider(s) for ongoing care. If your symptoms worsen or do not improve as expected, and you are unable to reach your usual health care provider(s), you should return to the Emergency Department. Today's healthcare is undergoing tremendous change, and patient satisfaction surveys are one of the many tools to assess the quality of medical care. You may receive a survey from the [employer] organization regarding your experience in the Emergency Department. I hope that your experience has been completely positive, particularly the medical care that I provided.   As such, please participate in the survey; anything less than excellent does not meet my expectations or intentions. [facility] participate in nationally recognized quality of care measures. If your blood pressure is greater than 120/80, as reported below, we urge that you seek medical care to address the potential of high blood pressure, commonly known as hypertension. Hypertension can be hereditary or can be caused by certain medical conditions, pain, stress, or \"white coat syndrome. \"       Please make an appointment with your health care provider(s) for follow up of your Emergency Department visit. VITALS:   Patient Vitals for the past 8 hrs:   Temp Pulse Resp BP SpO2   09/23/17 1300 - - - (!) 240/99 92 %   09/23/17 1245 - - - 166/74 95 %   09/23/17 1238 - 89 18 (!) 201/89 97 %   09/23/17 1232 98.1 °F (36.7 °C) 87 18 (!) 243/85 94 %   09/23/17 1230 - - - (!) 243/85 97 %          Thank you for allowing us to provide you with medical care today. We realize that you have many choices for your emergency care needs. Please choose us in the future for any continued health care needs. Cami Valenzuela, [address].   Office: [phone]            Recent Results (from the past 24 hour(s))   GLUCOSE, POC    Collection Time: 09/23/17  1:05 PM   Result Value Ref Range    Glucose (POC) 92 65 - 100 mg/dL    Performed by Katie Youssef    CBC WITH AUTOMATED DIFF    Collection Time: 09/23/17  1:10 PM   Result Value Ref Range    WBC 10.4 3.6 - 11.0 K/uL    RBC 4.77 3.80 - 5.20 M/uL    HGB 14.9 11.5 - 16.0 g/dL    HCT 44.7 35.0 - 47.0 %    MCV 93.7 80.0 - 99.0 FL    MCH 31.2 26.0 - 34.0 PG    MCHC 33.3 30.0 - 36.5 g/dL    RDW 13.3 11.5 - 14.5 %    PLATELET 886 693 - 378 K/uL    NEUTROPHILS 65 32 - 75 %    LYMPHOCYTES 24 12 - 49 %    MONOCYTES 7 5 - 13 %    EOSINOPHILS 3 0 - 7 %    BASOPHILS 1 0 - 1 %    ABS.  NEUTROPHILS 6.8 1.8 - 8.0 K/UL    ABS. LYMPHOCYTES 2.5 0.8 - 3.5 K/UL    ABS. MONOCYTES 0.7 0.0 - 1.0 K/UL    ABS. EOSINOPHILS 0.4 0.0 - 0.4 K/UL    ABS. BASOPHILS 0.1 0.0 - 0.1 K/UL   METABOLIC PANEL, COMPREHENSIVE    Collection Time: 09/23/17  1:10 PM   Result Value Ref Range    Sodium 142 136 - 145 mmol/L    Potassium 4.7 3.5 - 5.1 mmol/L    Chloride 107 97 - 108 mmol/L    CO2 25 21 - 32 mmol/L    Anion gap 10 5 - 15 mmol/L    Glucose 101 (H) 65 - 100 mg/dL    BUN 13 6 - 20 MG/DL    Creatinine 0.79 0.55 - 1.02 MG/DL    BUN/Creatinine ratio 16 12 - 20      GFR est AA >60 >60 ml/min/1.73m2    GFR est non-AA >60 >60 ml/min/1.73m2    Calcium 9.0 8.5 - 10.1 MG/DL    Bilirubin, total 0.5 0.2 - 1.0 MG/DL    ALT (SGPT) 27 12 - 78 U/L    AST (SGOT) 25 15 - 37 U/L    Alk. phosphatase 103 45 - 117 U/L    Protein, total 7.3 6.4 - 8.2 g/dL    Albumin 3.8 3.5 - 5.0 g/dL    Globulin 3.5 2.0 - 4.0 g/dL    A-G Ratio 1.1 1.1 - 2.2     URINALYSIS W/MICROSCOPIC    Collection Time: 09/23/17  1:22 PM   Result Value Ref Range    Color YELLOW/STRAW      Appearance CLOUDY (A) CLEAR      Specific gravity 1.012 1.003 - 1.030      pH (UA) 6.5 5.0 - 8.0      Protein NEGATIVE  NEG mg/dL    Glucose NEGATIVE  NEG mg/dL    Ketone NEGATIVE  NEG mg/dL    Bilirubin NEGATIVE  NEG      Blood SMALL (A) NEG      Urobilinogen 0.2 0.2 - 1.0 EU/dL    Nitrites POSITIVE (A) NEG      Leukocyte Esterase LARGE (A) NEG      WBC >100 (H) 0 - 4 /hpf    RBC 5-10 0 - 5 /hpf    Epithelial cells FEW FEW /lpf    Bacteria 4+ (A) NEG /hpf    Hyaline cast 0-2 0 - 5 /lpf   URINE CULTURE HOLD SAMPLE    Collection Time: 09/23/17  1:22 PM   Result Value Ref Range    Urine culture hold URINE ON HOLD IN MICROBIOLOGY DEPT FOR 3 DAYS         No results found.

## 2017-09-25 LAB
ATRIAL RATE: 85 BPM
BACTERIA SPEC CULT: ABNORMAL
CALCULATED P AXIS, ECG09: 52 DEGREES
CALCULATED R AXIS, ECG10: -58 DEGREES
CALCULATED T AXIS, ECG11: 40 DEGREES
CC UR VC: ABNORMAL
DIAGNOSIS, 93000: NORMAL
P-R INTERVAL, ECG05: 158 MS
Q-T INTERVAL, ECG07: 346 MS
QRS DURATION, ECG06: 72 MS
QTC CALCULATION (BEZET), ECG08: 411 MS
SERVICE CMNT-IMP: ABNORMAL
VENTRICULAR RATE, ECG03: 85 BPM

## 2017-09-29 ENCOUNTER — OFFICE VISIT (OUTPATIENT)
Dept: FAMILY MEDICINE CLINIC | Age: 82
End: 2017-09-29

## 2017-09-29 VITALS
HEART RATE: 87 BPM | DIASTOLIC BLOOD PRESSURE: 81 MMHG | HEIGHT: 60 IN | TEMPERATURE: 97.7 F | BODY MASS INDEX: 30.82 KG/M2 | OXYGEN SATURATION: 93 % | SYSTOLIC BLOOD PRESSURE: 190 MMHG | WEIGHT: 157 LBS | RESPIRATION RATE: 16 BRPM

## 2017-09-29 DIAGNOSIS — N30.00 ACUTE CYSTITIS WITHOUT HEMATURIA: Primary | ICD-10-CM

## 2017-09-29 DIAGNOSIS — M15.9 PRIMARY OSTEOARTHRITIS INVOLVING MULTIPLE JOINTS: ICD-10-CM

## 2017-09-29 DIAGNOSIS — I10 ESSENTIAL HYPERTENSION: ICD-10-CM

## 2017-09-29 RX ORDER — LISINOPRIL 20 MG/1
20 TABLET ORAL DAILY
Qty: 90 TAB | Refills: 1 | Status: SHIPPED | OUTPATIENT
Start: 2017-09-29 | End: 2017-12-26 | Stop reason: SDUPTHER

## 2017-09-29 RX ORDER — LISINOPRIL 20 MG/1
20 TABLET ORAL DAILY
Qty: 90 TAB | Refills: 1 | Status: SHIPPED | OUTPATIENT
Start: 2017-09-29 | End: 2017-09-29 | Stop reason: SDUPTHER

## 2017-09-29 NOTE — PROGRESS NOTES
Rory Parada is a 80 y.o. female who presents with the following complaints:  Chief Complaint   Patient presents with   [facility] ED Follow-up     [facility] ED visit 9/23/17 dx UTI    Medication Evaluation     To discuss aleve daily dosage        Subjective:    HPI:   Presents for f/u after ED visit last week for weakness/fatigue. Was found to have UTI. She has nearly completed Keflex course, has 2 pills remaining. Reports she is feeling well. She did have some episodes of confusion the first night she was home she believes were related to medication given in the ED, she is unsure what it was. Back to baseline since then. Reports ED provider advised her to discuss daily use of aleve with PCP. She has been taking 2 aleve daily for quite some time for her arthritis pain. She also takes tart cherry supplement. This has been controlling her symptoms to the point that she can do the things that she enjoys without too much pain. BP high today- she reports cardiologist added an additional medication, not sure of name. States it \"threw her for a loop\" and she had to stop it. She reported the episode and discontinuation to her cardiologist. Reports good compliance with lisinopril.     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, episode of care unspecified     Allergic rhinitis, cause unspecified     Brachial neuritis or radiculitis NOS     Cervicalgia     Disorder of bone and cartilage, unspecified     Dizziness and giddiness     Esophageal reflux     Hemorrhoid     Hypercholesterolemia     Insomnia, unspecified     Osteoarthrosis, unspecified whether generalized or localized, unspecified site     Osteoporosis, unspecified     Other abnormal blood chemistry     Other and unspecified hyperlipidemia     Pain in joint, forearm     Pain in joint, pelvic region and thigh     Pain in joint, shoulder region     Unspecified essential hypertension  Unspecified transient cerebral ischemia     Unspecified vitamin D deficiency      Past Surgical History:   Procedure Laterality Date    HX BUNIONECTOMY      HX CATARACT REMOVAL Bilateral     HX CHOLECYSTECTOMY      HX PARTIAL HYSTERECTOMY      HX TUBAL LIGATION       Family History   Problem Relation Age of Onset    Cancer Other     Stroke Mother     Stroke Father     Heart Disease Sister     Heart Disease Brother     Stroke Brother      Social History     Social History    Marital status:      Spouse name: N/A    Number of children: N/A    Years of education: N/A     Social History Main Topics    Smoking status: Former Smoker     Types: Cigarettes     Quit date: 3/3/1995    Smokeless tobacco: Never Used    Alcohol use 14.0 oz/week     28 Standard drinks or equivalent per week      Comment: beer and wine occ    Drug use: No    Sexual activity: No     Other Topics Concern    None     Social History Narrative     Advanced Directives: N      Patient Active Problem List    Diagnosis    Daytime somnolence    DDD (degenerative disc disease)    Hyperlipidemia       Nurse notes were reviewed and are correct  Review of Systems - negative except as listed above in the HPI    Objective:     Vitals:    09/29/17 1419   BP: 190/81   Pulse: 87   Resp: 16   Temp: 97.7 °F (36.5 °C)   TempSrc: Oral   SpO2: 93%   Weight: 157 lb (71.2 kg)   Height: 5' (1.524 m)     Physical Examination: General appearance - alert, well appearing, and in no distress, oriented to person, place, and time and well hydrated  Mental status - normal mood, behavior, speech, dress, motor activity, and thought processes  Neck - supple, no significant adenopathy  Chest - clear to auscultation, no wheezes, rales or rhonchi, symmetric air entry  Heart - normal rate, regular rhythm, normal S1, S2, no murmurs, rubs, clicks or gallops  Abdomen - soft, nontender, nondistended, no masses or organomegaly  Neurological - alert, oriented, normal speech, no focal findings or movement disorder noted  Skin - normal coloration and turgor, no rashes, no suspicious skin lesions noted    Reviewed ur cx/sensitivity from ED visit 9/23- grew Klebsiella, non-resistant. Assessment/ Plan:   Diagnoses and all orders for this visit:    1. Acute cystitis without hematuria  Sensitive to prescribed antibiotics  Complete course as ordered    2. Essential HTN  Above goal, has been at goal on previous visits  Continue lisinopril  RTC in 2-4 weeks for recheck  -     lisinopril (PRINIVIL, ZESTRIL) 20 mg tablet; Take 1 Tab by mouth daily. Osteoarthritis  We discussed the risks associated with regular use of NSAIDS, including damage to kidneys and increased risk of cardiovascular events. Patient elects to continue use of aleve with understanding of risks. Follow-up Disposition:  Return in about 4 weeks (around 10/27/2017). I have discussed the diagnosis with the patient and the intended plan as seen in the above orders. The patient has received an after-visit summary and questions were answered concerning future plans. The patient verbalizes understanding. Medication Side Effects and Warnings were discussed with patient: yes  Patient Labs were reviewed and or requested: yes  Patient Past Records were reviewed and or requested: yes    Patient Instructions        Urinary Tract Infection in Women: Care Instructions  Your Care Instructions    A urinary tract infection, or UTI, is a general term for an infection anywhere between the kidneys and the urethra (where urine comes out). Most UTIs are bladder infections. They often cause pain or burning when you urinate. UTIs are caused by bacteria and can be cured with antibiotics. Be sure to complete your treatment so that the infection goes away. Follow-up care is a key part of your treatment and safety. Be sure to make and go to all appointments, and call your doctor if you are having problems.  It's also a good idea to know your test results and keep a list of the medicines you take. How can you care for yourself at home? · Take your antibiotics as directed. Do not stop taking them just because you feel better. You need to take the full course of antibiotics. · Drink extra water and other fluids for the next day or two. This may help wash out the bacteria that are causing the infection. (If you have kidney, heart, or liver disease and have to limit fluids, talk with your doctor before you increase your fluid intake.)  · Avoid drinks that are carbonated or have caffeine. They can irritate the bladder. · Urinate often. Try to empty your bladder each time. · To relieve pain, take a hot bath or lay a heating pad set on low over your lower belly or genital area. Never go to sleep with a heating pad in place. To prevent UTIs  · Drink plenty of water each day. This helps you urinate often, which clears bacteria from your system. (If you have kidney, heart, or liver disease and have to limit fluids, talk with your doctor before you increase your fluid intake.)  · Urinate when you need to. · Urinate right after you have sex. · Change sanitary pads often. · Avoid douches, bubble baths, feminine hygiene sprays, and other feminine hygiene products that have deodorants. · After going to the bathroom, wipe from front to back. When should you call for help? Call your doctor now or seek immediate medical care if:  · Symptoms such as fever, chills, nausea, or vomiting get worse or appear for the first time. · You have new pain in your back just below your rib cage. This is called flank pain. · There is new blood or pus in your urine. · You have any problems with your antibiotic medicine. Watch closely for changes in your health, and be sure to contact your doctor if:  · You are not getting better after taking an antibiotic for 2 days. · Your symptoms go away but then come back. Where can you learn more?   Go to http://pierre-shannan.info/. Enter F416 in the search box to learn more about \"Urinary Tract Infection in Women: Care Instructions. \"  Current as of: November 28, 2016  Content Version: 11.3  © 6382-3235 [employer]. Care instructions adapted under license by [employer] (which disclaims liability or warranty for this information). If you have questions about a medical condition or this instruction, always ask your healthcare professional. [place] [address] any warranty or liability for your use of this information.           Donn Skiff FNP

## 2017-09-29 NOTE — MR AVS SNAPSHOT
Visit Information Date & Time Provider Department Dept. Phone Encounter #  
 9/29/2017  2:15 PM [facility] [phone] [id number] Follow-up Instructions Return in about 4 weeks (around 10/27/2017). Upcoming Health Maintenance Date Due DTaP/Tdap/Td series (1 - Tdap) 7/4/1952 ZOSTER VACCINE AGE 60> 5/4/1991 GLAUCOMA SCREENING Q2Y 7/4/1996 Pneumococcal 65+ Low/Medium Risk (1 of 2 - PCV13) 7/4/1996 INFLUENZA AGE 9 TO ADULT 8/1/2017 MEDICARE YEARLY EXAM 3/3/2018 Allergies as of 9/29/2017  Review Complete On: 9/29/2017 By: Crow Quiñones NP Severity Noted Reaction Type Reactions Egg Medium 05/08/2015   Intolerance Diarrhea, Nausea Only Current Immunizations  Reviewed on 10/20/2015 Name Date Influenza Vaccine ([place] Canine Kidney) PF 9/20/2015, 8/14/2014 Not reviewed this visit You Were Diagnosed With   
  
 Codes Comments Acute cystitis without hematuria    -  Primary ICD-10-CM: N30.00 ICD-9-CM: 595.0 Vitals BP Pulse Temp Resp Height(growth percentile) Weight(growth percentile) 190/81 (BP 1 Location: Right arm, BP Patient Position: Sitting) 87 97.7 °F (36.5 °C) (Oral) 16 5' (1.524 m) 157 lb (71.2 kg) SpO2 BMI OB Status Smoking Status 93% 30.66 kg/m2 Postmenopausal Former Smoker Vitals History BMI and BSA Data Body Mass Index Body Surface Area  
 30.66 kg/m 2 1.74 m 2 Preferred Pharmacy Pharmacy Name Phone [facility] [address] [phone] Your Updated Medication List  
  
   
This list is accurate as of: 9/29/17  2:46 PM.  Always use your most recent med list.  
  
  
  
  
 ALEVE 220 mg tablet Generic drug:  naproxen sodium Take 220 mg by mouth daily. atorvastatin 10 mg tablet Commonly known as:  LIPITOR Take 1 Tab by mouth daily. cephALEXin 500 mg capsule Commonly known as:  Estel  Take 1 Cap by mouth two (2) times a day for 7 days. lisinopril 20 mg tablet Commonly known as:  Vida Bety Take 1 Tab by mouth daily. Indications: pt says she believes she takes 5mg tablets  
  
 loratadine 10 mg tablet Commonly known as:  Veryl Bleak Take 10 mg by mouth daily. Omeprazole delayed release 20 mg tablet Commonly known as:  PRILOSEC D/R Take 1 Tab by mouth daily. TART CHERRY PO Take  by mouth. Prescriptions Printed Refills  
 lisinopril (PRINIVIL, ZESTRIL) 20 mg tablet 1 Sig: Take 1 Tab by mouth daily. Indications: pt says she believes she takes 5mg tablets Class: Print Route: Oral  
  
Follow-up Instructions Return in about 4 weeks (around 10/27/2017). Patient Instructions Urinary Tract Infection in Women: Care Instructions Your Care Instructions A urinary tract infection, or UTI, is a general term for an infection anywhere between the kidneys and the urethra (where urine comes out). Most UTIs are bladder infections. They often cause pain or burning when you urinate. UTIs are caused by bacteria and can be cured with antibiotics. Be sure to complete your treatment so that the infection goes away. Follow-up care is a key part of your treatment and safety. Be sure to make and go to all appointments, and call your doctor if you are having problems. It's also a good idea to know your test results and keep a list of the medicines you take. How can you care for yourself at home? · Take your antibiotics as directed. Do not stop taking them just because you feel better. You need to take the full course of antibiotics. · Drink extra water and other fluids for the next day or two. This may help wash out the bacteria that are causing the infection.  (If you have kidney, heart, or liver disease and have to limit fluids, talk with your doctor before you increase your fluid intake.) · Avoid drinks that are carbonated or have caffeine. They can irritate the bladder. · Urinate often. Try to empty your bladder each time. · To relieve pain, take a hot bath or lay a heating pad set on low over your lower belly or genital area. Never go to sleep with a heating pad in place. To prevent UTIs · Drink plenty of water each day. This helps you urinate often, which clears bacteria from your system. (If you have kidney, heart, or liver disease and have to limit fluids, talk with your doctor before you increase your fluid intake.) · Urinate when you need to. · Urinate right after you have sex. · Change sanitary pads often. · Avoid douches, bubble baths, feminine hygiene sprays, and other feminine hygiene products that have deodorants. · After going to the bathroom, wipe from front to back. When should you call for help? Call your doctor now or seek immediate medical care if: · Symptoms such as fever, chills, nausea, or vomiting get worse or appear for the first time. · You have new pain in your back just below your rib cage. This is called flank pain. · There is new blood or pus in your urine. · You have any problems with your antibiotic medicine. Watch closely for changes in your health, and be sure to contact your doctor if: 
· You are not getting better after taking an antibiotic for 2 days. · Your symptoms go away but then come back. Where can you learn more? Go to http://pierre-shannan.info/. Enter P525 in the search box to learn more about \"Urinary Tract Infection in Women: Care Instructions. \" Current as of: November 28, 2016 Content Version: 11.3 © 5419-8367 [employer]. Care instructions adapted under license by [employer] (which disclaims liability or warranty for this information).  If you have questions about a medical condition or this instruction, always ask your healthcare professional. [address] any warranty or liability for your use of this information. Introducing [facility]! [facility] introduces FireEye patient portal. Now you can access parts of your medical record, email your doctor's office, and request medication refills online. 1. In your internet browser, go to [email or web address] 2. Click on the First Time User? Click Here link in the Sign In box. You will see the New Member Sign Up page. 3. Enter your FireEye Access Code exactly as it appears below. You will not need to use this code after youve completed the sign-up process. If you do not sign up before the expiration date, you must request a new code. · FireEye Access Code: [email or web address] Expires: 12/22/2017 12:47 PM 
 
4. Enter the last four digits of your Social Security Number (xxxx) and Date of Birth (mm/dd/yyyy) as indicated and click Submit. You will be taken to the next sign-up page. 5. Create a FireEye ID. This will be your FireEye login ID and cannot be changed, so think of one that is secure and easy to remember. 6. Create a FireEye password. You can change your password at any time. 7. Enter your Password Reset Question and Answer. This can be used at a later time if you forget your password. 8. Enter your e-mail address. You will receive e-mail notification when new information is available in [address]. 9. Click Sign Up. You can now view and download portions of your medical record. 10. Click the Download Summary menu link to download a portable copy of your medical information. If you have questions, please visit the Frequently Asked Questions section of the FireEye website. Remember, FireEye is NOT to be used for urgent needs. For medical emergencies, dial 911. Now available from your iPhone and Android! Please provide this summary of care documentation to your next provider. Your primary care clinician is listed as Anthony Gallegos. If you have any questions after today's visit, please call [phone].

## 2017-09-29 NOTE — PROGRESS NOTES
Chief Complaint   Patient presents with   [facility] ED Follow-up     [facility] ED visit 9/23/17 dx UTI     1. Have you been to the ER, urgent care clinic since your last visit? Hospitalized since your last visit? Yes [facility]    2. Have you seen or consulted any other health care providers outside of the [address] [place] [address] since your last visit? Include any pap smears or colon screening. No    Patient to discuss taking 2 Aleve tablets daily, states ED physician suggested against it     Patient also indicated she has had both Pneumonia vaccines but cannot recall the dates or where given. Unsure how to document this.      Patient also indicates she was given a drug in the ED that caused her to have an \"acute episode of dementia\" Unsure of drug name

## 2017-09-29 NOTE — PATIENT INSTRUCTIONS
Urinary Tract Infection in Women: Care Instructions  Your Care Instructions    A urinary tract infection, or UTI, is a general term for an infection anywhere between the kidneys and the urethra (where urine comes out). Most UTIs are bladder infections. They often cause pain or burning when you urinate. UTIs are caused by bacteria and can be cured with antibiotics. Be sure to complete your treatment so that the infection goes away. Follow-up care is a key part of your treatment and safety. Be sure to make and go to all appointments, and call your doctor if you are having problems. It's also a good idea to know your test results and keep a list of the medicines you take. How can you care for yourself at home? · Take your antibiotics as directed. Do not stop taking them just because you feel better. You need to take the full course of antibiotics. · Drink extra water and other fluids for the next day or two. This may help wash out the bacteria that are causing the infection. (If you have kidney, heart, or liver disease and have to limit fluids, talk with your doctor before you increase your fluid intake.)  · Avoid drinks that are carbonated or have caffeine. They can irritate the bladder. · Urinate often. Try to empty your bladder each time. · To relieve pain, take a hot bath or lay a heating pad set on low over your lower belly or genital area. Never go to sleep with a heating pad in place. To prevent UTIs  · Drink plenty of water each day. This helps you urinate often, which clears bacteria from your system. (If you have kidney, heart, or liver disease and have to limit fluids, talk with your doctor before you increase your fluid intake.)  · Urinate when you need to. · Urinate right after you have sex. · Change sanitary pads often. · Avoid douches, bubble baths, feminine hygiene sprays, and other feminine hygiene products that have deodorants.   · After going to the bathroom, wipe from front to back.  When should you call for help? Call your doctor now or seek immediate medical care if:  · Symptoms such as fever, chills, nausea, or vomiting get worse or appear for the first time. · You have new pain in your back just below your rib cage. This is called flank pain. · There is new blood or pus in your urine. · You have any problems with your antibiotic medicine. Watch closely for changes in your health, and be sure to contact your doctor if:  · You are not getting better after taking an antibiotic for 2 days. · Your symptoms go away but then come back. Where can you learn more? Go to http://pierre-shannan.info/. Enter X285 in the search box to learn more about \"Urinary Tract Infection in Women: Care Instructions. \"  Current as of: November 28, 2016  Content Version: 11.3  © 8445-6820 [employer]. Care instructions adapted under license by [employer] (which disclaims liability or warranty for this information). If you have questions about a medical condition or this instruction, always ask your healthcare professional. [address] any warranty or liability for your use of this information.

## 2017-10-25 ENCOUNTER — OFFICE VISIT (OUTPATIENT)
Dept: FAMILY MEDICINE CLINIC | Age: 82
End: 2017-10-25

## 2017-10-25 VITALS
TEMPERATURE: 98.5 F | SYSTOLIC BLOOD PRESSURE: 135 MMHG | OXYGEN SATURATION: 92 % | HEIGHT: 60 IN | RESPIRATION RATE: 16 BRPM | BODY MASS INDEX: 30.43 KG/M2 | DIASTOLIC BLOOD PRESSURE: 72 MMHG | HEART RATE: 92 BPM | WEIGHT: 155 LBS

## 2017-10-25 DIAGNOSIS — R21 RASH: Primary | ICD-10-CM

## 2017-10-25 DIAGNOSIS — Z23 ENCOUNTER FOR IMMUNIZATION: ICD-10-CM

## 2017-10-25 DIAGNOSIS — I10 ESSENTIAL HYPERTENSION: ICD-10-CM

## 2017-10-25 DIAGNOSIS — E78.5 HYPERLIPIDEMIA, UNSPECIFIED HYPERLIPIDEMIA TYPE: ICD-10-CM

## 2017-10-25 RX ORDER — KETOCONAZOLE 20 MG/G
CREAM TOPICAL DAILY
Qty: 15 G | Refills: 0 | Status: SHIPPED | OUTPATIENT
Start: 2017-10-25

## 2017-10-25 NOTE — PROGRESS NOTES
Chief Complaint   Patient presents with    Hypertension     she is a 80y.o. year old female who presents for evalution. she is here to follow up on the BP. Also has an itchy area on the right post calf that concerns her  She tried a beta blocker for a short time but it made her so sleepy she could not handle it. She was put back on lisinopril and increased it 20 mg    She takes alleve every morning and sometimes a second on ea day    Reviewed PmHx, RxHx, FmHx, SocHx, AllgHx and updated and dated in the chart. Aspirin yes ____   No__x__ N/A____    Patient Active Problem List    Diagnosis    Essential hypertension    Daytime somnolence    DDD (degenerative disc disease)    Hyperlipidemia       Nurse notes were reviewed and copied and are correct  Review of Systems - negative except as listed above in the HPI    Objective:     Vitals:    10/25/17 1413 10/25/17 1427   BP: 174/77 135/72   Pulse: 92    Resp: 16    Temp: 98.5 °F (36.9 °C)    TempSrc: Oral    SpO2: 92%    Weight: 155 lb (70.3 kg)    Height: 5' (1.524 m)         Physical Examination: General appearance - alert, well appearing, and in no distress  Mental status - alert, oriented to person, place, and time  Neck - supple, no significant adenopathy  Lymphatics - no palpable lymphadenopathy, no hepatosplenomegaly  Chest - clear to auscultation, no wheezes, rales or rhonchi, symmetric air entry  Heart - normal rate and regular rhythm, systolic murmur 3/6 at 2nd left intercostal space  Abdomen - soft, nontender, nondistended, no masses or organomegaly      Assessment/ Plan:   Diagnoses and all orders for this visit:    1. Rash  -     ketoconazole (NIZORAL) 2 % topical cream; Apply  to affected area daily. 2. Hyperlipidemia, unspecified hyperlipidemia type  Lipid needs to be rechecked, will do next vidsit  3.  Essential hypertension  cmp was checked by another MD.   4. Encounter for immunization  -     TETANUS, [address] [place] [address] PERTUSSIS VACCINE (TDAP), IN INDIVIDS. >=7, IM       Follow-up Disposition:  Return in about 3 months (around 1/25/2018). ICD-10-CM ICD-9-CM    1. Rash R21 782.1 ketoconazole (NIZORAL) 2 % topical cream   2. Hyperlipidemia, unspecified hyperlipidemia type E78.5 272.4    3. Essential hypertension I10 401.9    4. Encounter for immunization Z23 V03.89 TETANUS, DIPHTHERIA TOXOIDS AND ACELLULAR PERTUSSIS VACCINE (TDAP), IN INDIVIDS. >=7, IM       I have discussed the diagnosis with the patient and the intended plan as seen in the above orders. The patient has received an after-visit summary and questions were answered concerning future plans. Medication Side Effects and Warnings were discussed with patient: yes  Patient Labs were reviewed and or requested: yes  Patient Past Records were reviewed and or requested: yes        Patient Instructions        High Blood Pressure: Care Instructions  Your Care Instructions  If your blood pressure is usually above 140/90, you have high blood pressure, or hypertension. That means the top number is 140 or higher or the bottom number is 90 or higher, or both. Despite what a lot of people think, high blood pressure usually doesn't cause headaches or make you feel dizzy or lightheaded. It usually has no symptoms. But it does increase your risk for heart attack, stroke, and kidney or eye damage. The higher your blood pressure, the more your risk increases. Your doctor will give you a goal for your blood pressure. Your goal will be based on your health and your age. An example of a goal is to keep your blood pressure below 140/90. Lifestyle changes, such as eating healthy and being active, are always important to help lower blood pressure. You might also take medicine to reach your blood pressure goal.  Follow-up care is a key part of your treatment and safety. Be sure to make and go to all appointments, and call your doctor if you are having problems.  It's also a good idea to know your test results and keep a list of the medicines you take. How can you care for yourself at home? Medical treatment  · If you stop taking your medicine, your blood pressure will go back up. You may take one or more types of medicine to lower your blood pressure. Be safe with medicines. Take your medicine exactly as prescribed. Call your doctor if you think you are having a problem with your medicine. · Talk to your doctor before you start taking aspirin every day. Aspirin can help certain people lower their risk of a heart attack or stroke. But taking aspirin isn't right for everyone, because it can cause serious bleeding. · See your doctor regularly. You may need to see the doctor more often at first or until your blood pressure comes down. · If you are taking blood pressure medicine, talk to your doctor before you take decongestants or anti-inflammatory medicine, such as ibuprofen. Some of these medicines can raise blood pressure. · Learn how to check your blood pressure at home. Lifestyle changes  · Stay at a healthy weight. This is especially important if you put on weight around the waist. Losing even 10 pounds can help you lower your blood pressure. · If your doctor recommends it, get more exercise. Walking is a good choice. Bit by bit, increase the amount you walk every day. Try for at least 30 minutes on most days of the week. You also may want to swim, bike, or do other activities. · Avoid or limit alcohol. Talk to your doctor about whether you can drink any alcohol. · Try to limit how much sodium you eat to less than 2,300 milligrams (mg) a day. Your doctor may ask you to try to eat less than 1,500 mg a day. · Eat plenty of fruits (such as bananas and oranges), vegetables, legumes, whole grains, and low-fat dairy products. · Lower the amount of saturated fat in your diet. Saturated fat is found in animal products such as milk, cheese, and meat.  Limiting these foods may help you lose weight and also lower your risk for heart disease. · Do not smoke. Smoking increases your risk for heart attack and stroke. If you need help quitting, talk to your doctor about stop-smoking programs and medicines. These can increase your chances of quitting for good. When should you call for help? Call 911 anytime you think you may need emergency care. This may mean having symptoms that suggest that your blood pressure is causing a serious heart or blood vessel problem. Your blood pressure may be over 180/110. For example, call 911 if:  · You have symptoms of a heart attack. These may include:  ¨ Chest pain or pressure, or a strange feeling in the chest.  ¨ Sweating. ¨ Shortness of breath. ¨ Nausea or vomiting. ¨ Pain, pressure, or a strange feeling in the back, neck, jaw, or upper belly or in one or both shoulders or arms. ¨ Lightheadedness or sudden weakness. ¨ A fast or irregular heartbeat. · You have symptoms of a stroke. These may include:  ¨ Sudden numbness, tingling, weakness, or loss of movement in your face, arm, or leg, especially on only one side of your body. ¨ Sudden vision changes. ¨ Sudden trouble speaking. ¨ Sudden confusion or trouble understanding simple statements. ¨ Sudden problems with walking or balance. ¨ A sudden, severe headache that is different from past headaches. · You have severe back or belly pain. Do not wait until your blood pressure comes down on its own. Get help right away. Call your doctor now or seek immediate care if:  · Your blood pressure is much higher than normal (such as 180/110 or higher), but you don't have symptoms. · You think high blood pressure is causing symptoms, such as:  ¨ Severe headache. ¨ Blurry vision. Watch closely for changes in your health, and be sure to contact your doctor if:  · Your blood pressure measures 140/90 or higher at least 2 times.  That means the top number is 140 or higher or the bottom number is 90 or higher, or both.  · You think you may be having side effects from your blood pressure medicine. · Your blood pressure is usually normal, but it goes above normal at least 2 times. Where can you learn more? Go to http://pierre-shannan.info/. Enter B657 in the search box to learn more about \"High Blood Pressure: Care Instructions. \"  Current as of: August 8, 2016  Content Version: 11.3  © 4150-3389 [employer]. Care instructions adapted under license by [employer] (which disclaims liability or warranty for this information). If you have questions about a medical condition or this instruction, always ask your healthcare professional. [address] any warranty or liability for your use of this information. DASH Diet: Care Instructions  Your Care Instructions  The DASH diet is an eating plan that can help lower your blood pressure. DASH stands for Dietary Approaches to Stop Hypertension. Hypertension is high blood pressure. The DASH diet focuses on eating foods that are high in calcium, potassium, and magnesium. These nutrients can lower blood pressure. The foods that are highest in these nutrients are fruits, vegetables, low-fat dairy products, nuts, seeds, and legumes. But taking calcium, potassium, and magnesium supplements instead of eating foods that are high in those nutrients does not have the same effect. The DASH diet also includes whole grains, fish, and poultry. The DASH diet is one of several lifestyle changes your doctor may recommend to lower your high blood pressure. Your doctor may also want you to decrease the amount of sodium in your diet. Lowering sodium while following the DASH diet can lower blood pressure even further than just the DASH diet alone. Follow-up care is a key part of your treatment and safety. Be sure to make and go to all appointments, and call your doctor if you are having problems.  It's also a good idea to know your test results and keep a list of the medicines you take. How can you care for yourself at home? Following the DASH diet  · Eat 4 to 5 servings of fruit each day. A serving is 1 medium-sized piece of fruit, ½ cup chopped or canned fruit, 1/4 cup dried fruit, or 4 ounces (½ cup) of fruit juice. Choose fruit more often than fruit juice. · Eat 4 to 5 servings of vegetables each day. A serving is 1 cup of lettuce or raw leafy vegetables, ½ cup of chopped or cooked vegetables, or 4 ounces (½ cup) of vegetable juice. Choose vegetables more often than vegetable juice. · Get 2 to 3 servings of low-fat and fat-free dairy each day. A serving is 8 ounces of milk, 1 cup of yogurt, or 1 ½ ounces of cheese. · Eat 6 to 8 servings of grains each day. A serving is 1 slice of bread, 1 ounce of dry cereal, or ½ cup of cooked rice, pasta, or cooked cereal. Try to choose whole-grain products as much as possible. · Limit lean meat, poultry, and fish to 2 servings each day. A serving is 3 ounces, about the size of a deck of cards. · Eat 4 to 5 servings of nuts, seeds, and legumes (cooked dried beans, lentils, and split peas) each week. A serving is 1/3 cup of nuts, 2 tablespoons of seeds, or ½ cup of cooked beans or peas. · Limit fats and oils to 2 to 3 servings each day. A serving is 1 teaspoon of vegetable oil or 2 tablespoons of salad dressing. · Limit sweets and added sugars to 5 servings or less a week. A serving is 1 tablespoon jelly or jam, ½ cup sorbet, or 1 cup of lemonade. · Eat less than 2,300 milligrams (mg) of sodium a day. If you limit your sodium to 1,500 mg a day, you can lower your blood pressure even more. Tips for success  · Start small. Do not try to make dramatic changes to your diet all at once. You might feel that you are missing out on your favorite foods and then be more likely to not follow the plan. Make small changes, and stick with them. Once those changes become habit, add a few more changes.   · Try some of the following:  ¨ Make it a goal to eat a fruit or vegetable at every meal and at snacks. This will make it easy to get the recommended amount of fruits and vegetables each day. ¨ Try yogurt topped with fruit and nuts for a snack or healthy dessert. ¨ Add lettuce, tomato, cucumber, and onion to sandwiches. ¨ Combine a ready-made pizza crust with low-fat mozzarella cheese and lots of vegetable toppings. Try using tomatoes, squash, spinach, broccoli, carrots, cauliflower, and onions. ¨ Have a variety of cut-up vegetables with a low-fat dip as an appetizer instead of chips and dip. ¨ Sprinkle sunflower seeds or chopped almonds over salads. Or try adding chopped walnuts or almonds to cooked vegetables. ¨ Try some vegetarian meals using beans and peas. Add garbanzo or kidney beans to salads. Make burritos and tacos with mashed barnett beans or black beans. Where can you learn more? Go to http://pierre-shannan.info/. Enter L752 in the search box to learn more about \"DASH Diet: Care Instructions. \"  Current as of: April 3, 2017  Content Version: 11.3  © 5641-5833 [employer]. Care instructions adapted under license by [employer] (which disclaims liability or warranty for this information). If you have questions about a medical condition or this instruction, always ask your healthcare professional. [place] [address] any warranty or liability for your use of this information.         The patient verbalizes understanding and agrees with the plan of care        Patient has the advanced directives booklet to review

## 2017-10-25 NOTE — PATIENT INSTRUCTIONS
High Blood Pressure: Care Instructions  Your Care Instructions  If your blood pressure is usually above 140/90, you have high blood pressure, or hypertension. That means the top number is 140 or higher or the bottom number is 90 or higher, or both. Despite what a lot of people think, high blood pressure usually doesn't cause headaches or make you feel dizzy or lightheaded. It usually has no symptoms. But it does increase your risk for heart attack, stroke, and kidney or eye damage. The higher your blood pressure, the more your risk increases. Your doctor will give you a goal for your blood pressure. Your goal will be based on your health and your age. An example of a goal is to keep your blood pressure below 140/90. Lifestyle changes, such as eating healthy and being active, are always important to help lower blood pressure. You might also take medicine to reach your blood pressure goal.  Follow-up care is a key part of your treatment and safety. Be sure to make and go to all appointments, and call your doctor if you are having problems. It's also a good idea to know your test results and keep a list of the medicines you take. How can you care for yourself at home? Medical treatment  · If you stop taking your medicine, your blood pressure will go back up. You may take one or more types of medicine to lower your blood pressure. Be safe with medicines. Take your medicine exactly as prescribed. Call your doctor if you think you are having a problem with your medicine. · Talk to your doctor before you start taking aspirin every day. Aspirin can help certain people lower their risk of a heart attack or stroke. But taking aspirin isn't right for everyone, because it can cause serious bleeding. · See your doctor regularly. You may need to see the doctor more often at first or until your blood pressure comes down.   · If you are taking blood pressure medicine, talk to your doctor before you take decongestants or anti-inflammatory medicine, such as ibuprofen. Some of these medicines can raise blood pressure. · Learn how to check your blood pressure at home. Lifestyle changes  · Stay at a healthy weight. This is especially important if you put on weight around the waist. Losing even 10 pounds can help you lower your blood pressure. · If your doctor recommends it, get more exercise. Walking is a good choice. Bit by bit, increase the amount you walk every day. Try for at least 30 minutes on most days of the week. You also may want to swim, bike, or do other activities. · Avoid or limit alcohol. Talk to your doctor about whether you can drink any alcohol. · Try to limit how much sodium you eat to less than 2,300 milligrams (mg) a day. Your doctor may ask you to try to eat less than 1,500 mg a day. · Eat plenty of fruits (such as bananas and oranges), vegetables, legumes, whole grains, and low-fat dairy products. · Lower the amount of saturated fat in your diet. Saturated fat is found in animal products such as milk, cheese, and meat. Limiting these foods may help you lose weight and also lower your risk for heart disease. · Do not smoke. Smoking increases your risk for heart attack and stroke. If you need help quitting, talk to your doctor about stop-smoking programs and medicines. These can increase your chances of quitting for good. When should you call for help? Call 911 anytime you think you may need emergency care. This may mean having symptoms that suggest that your blood pressure is causing a serious heart or blood vessel problem. Your blood pressure may be over 180/110. For example, call 911 if:  · You have symptoms of a heart attack. These may include:  ¨ Chest pain or pressure, or a strange feeling in the chest.  ¨ Sweating. ¨ Shortness of breath. ¨ Nausea or vomiting. ¨ Pain, pressure, or a strange feeling in the back, neck, jaw, or upper belly or in one or both shoulders or arms.   ¨ Lightheadedness or sudden weakness. ¨ A fast or irregular heartbeat. · You have symptoms of a stroke. These may include:  ¨ Sudden numbness, tingling, weakness, or loss of movement in your face, arm, or leg, especially on only one side of your body. ¨ Sudden vision changes. ¨ Sudden trouble speaking. ¨ Sudden confusion or trouble understanding simple statements. ¨ Sudden problems with walking or balance. ¨ A sudden, severe headache that is different from past headaches. · You have severe back or belly pain. Do not wait until your blood pressure comes down on its own. Get help right away. Call your doctor now or seek immediate care if:  · Your blood pressure is much higher than normal (such as 180/110 or higher), but you don't have symptoms. · You think high blood pressure is causing symptoms, such as:  ¨ Severe headache. ¨ Blurry vision. Watch closely for changes in your health, and be sure to contact your doctor if:  · Your blood pressure measures 140/90 or higher at least 2 times. That means the top number is 140 or higher or the bottom number is 90 or higher, or both. · You think you may be having side effects from your blood pressure medicine. · Your blood pressure is usually normal, but it goes above normal at least 2 times. Where can you learn more? Go to http://pierre-shannan.info/. Enter S470 in the search box to learn more about \"High Blood Pressure: Care Instructions. \"  Current as of: August 8, 2016  Content Version: 11.3  © 2656-0423 [employer]. Care instructions adapted under license by [employer] (which disclaims liability or warranty for this information). If you have questions about a medical condition or this instruction, always ask your healthcare professional. [place] [address] any warranty or liability for your use of this information.        DASH Diet: Care Instructions  Your Care Instructions  The DASH diet is an eating plan that can help lower your blood pressure. DASH stands for Dietary Approaches to Stop Hypertension. Hypertension is high blood pressure. The DASH diet focuses on eating foods that are high in calcium, potassium, and magnesium. These nutrients can lower blood pressure. The foods that are highest in these nutrients are fruits, vegetables, low-fat dairy products, nuts, seeds, and legumes. But taking calcium, potassium, and magnesium supplements instead of eating foods that are high in those nutrients does not have the same effect. The DASH diet also includes whole grains, fish, and poultry. The DASH diet is one of several lifestyle changes your doctor may recommend to lower your high blood pressure. Your doctor may also want you to decrease the amount of sodium in your diet. Lowering sodium while following the DASH diet can lower blood pressure even further than just the DASH diet alone. Follow-up care is a key part of your treatment and safety. Be sure to make and go to all appointments, and call your doctor if you are having problems. It's also a good idea to know your test results and keep a list of the medicines you take. How can you care for yourself at home? Following the DASH diet  · Eat 4 to 5 servings of fruit each day. A serving is 1 medium-sized piece of fruit, ½ cup chopped or canned fruit, 1/4 cup dried fruit, or 4 ounces (½ cup) of fruit juice. Choose fruit more often than fruit juice. · Eat 4 to 5 servings of vegetables each day. A serving is 1 cup of lettuce or raw leafy vegetables, ½ cup of chopped or cooked vegetables, or 4 ounces (½ cup) of vegetable juice. Choose vegetables more often than vegetable juice. · Get 2 to 3 servings of low-fat and fat-free dairy each day. A serving is 8 ounces of milk, 1 cup of yogurt, or 1 ½ ounces of cheese. · Eat 6 to 8 servings of grains each day.  A serving is 1 slice of bread, 1 ounce of dry cereal, or ½ cup of cooked rice, pasta, or cooked cereal. Try to choose whole-grain products as much as possible. · Limit lean meat, poultry, and fish to 2 servings each day. A serving is 3 ounces, about the size of a deck of cards. · Eat 4 to 5 servings of nuts, seeds, and legumes (cooked dried beans, lentils, and split peas) each week. A serving is 1/3 cup of nuts, 2 tablespoons of seeds, or ½ cup of cooked beans or peas. · Limit fats and oils to 2 to 3 servings each day. A serving is 1 teaspoon of vegetable oil or 2 tablespoons of salad dressing. · Limit sweets and added sugars to 5 servings or less a week. A serving is 1 tablespoon jelly or jam, ½ cup sorbet, or 1 cup of lemonade. · Eat less than 2,300 milligrams (mg) of sodium a day. If you limit your sodium to 1,500 mg a day, you can lower your blood pressure even more. Tips for success  · Start small. Do not try to make dramatic changes to your diet all at once. You might feel that you are missing out on your favorite foods and then be more likely to not follow the plan. Make small changes, and stick with them. Once those changes become habit, add a few more changes. · Try some of the following:  ¨ Make it a goal to eat a fruit or vegetable at every meal and at snacks. This will make it easy to get the recommended amount of fruits and vegetables each day. ¨ Try yogurt topped with fruit and nuts for a snack or healthy dessert. ¨ Add lettuce, tomato, cucumber, and onion to sandwiches. ¨ Combine a ready-made pizza crust with low-fat mozzarella cheese and lots of vegetable toppings. Try using tomatoes, squash, spinach, broccoli, carrots, cauliflower, and onions. ¨ Have a variety of cut-up vegetables with a low-fat dip as an appetizer instead of chips and dip. ¨ Sprinkle sunflower seeds or chopped almonds over salads. Or try adding chopped walnuts or almonds to cooked vegetables. ¨ Try some vegetarian meals using beans and peas. Add garbanzo or kidney beans to salads.  Make burritos and tacos with mashed barnett beans or black beans.  Where can you learn more? Go to http://pierre-shannan.info/. Enter X879 in the search box to learn more about \"DASH Diet: Care Instructions. \"  Current as of: April 3, 2017  Content Version: 11.3  © 5986-3947 [employer]. Care instructions adapted under license by [employer] (which disclaims liability or warranty for this information). If you have questions about a medical condition or this instruction, always ask your healthcare professional. [address] any warranty or liability for your use of this information.

## 2017-10-25 NOTE — PROGRESS NOTES
1. Have you been to the ER, urgent care clinic since your last visit? Hospitalized since your last visit? [facility] September 2017 for kidney infection. 2. Have you seen or consulted any other health care providers outside of the [address] [place] [address] since your last visit? Include any pap smears or colon screening.  No     Chief Complaint   Patient presents with    Hypertension

## 2017-10-25 NOTE — MR AVS SNAPSHOT
Visit Information Date & Time Provider Department Dept. Phone Encounter #  
 10/25/2017  2:00 PM Eddie Enriquez MD [address] [place] [address] [id number] Follow-up Instructions Return in about 3 months (around 1/25/2018). Upcoming Health Maintenance Date Due DTaP/Tdap/Td series (1 - Tdap) 7[birth year]4/1952 ZOSTER VACCINE AGE 60> 5/4/1991 GLAUCOMA SCREENING Q2Y 7/4/1996 MEDICARE YEARLY EXAM 3/3/2018 Pneumococcal 65+ Low/Medium Risk (2 of 2 - PPSV23) 10/25/2018 Allergies as of 10/25/2017  Review Complete On: 10/25/2017 By: Rafa Shipman LPN Severity Noted Reaction Type Reactions Egg Medium 05/08/2015   Intolerance Diarrhea, Nausea Only Current Immunizations  Reviewed on 10/20/2015 Name Date Influenza Vaccine (Madin Misti Canine Kidney) PF 9/20/2015, 8/14/2014 Influenza Vaccine (Quadrivalent) 10/19/2017 Not reviewed this visit You Were Diagnosed With   
  
 Codes Comments Rash    -  Primary ICD-10-CM: R21 
ICD-9-CM: 782.1 Hyperlipidemia, unspecified hyperlipidemia type     ICD-10-CM: E78.5 ICD-9-CM: 272.4 Essential hypertension     ICD-10-CM: I10 
ICD-9-CM: 401.9 Vitals BP Pulse Temp Resp Height(growth percentile) Weight(growth percentile) 135/72 92 98.5 °F (36.9 °C) (Oral) 16 5' (1.524 m) 155 lb (70.3 kg) SpO2 BMI OB Status Smoking Status 92% 30.27 kg/m2 Postmenopausal Former Smoker Vitals History BMI and BSA Data Body Mass Index Body Surface Area  
 30.27 kg/m 2 1.73 m 2 Preferred Pharmacy Pharmacy Name Phone [id number] [place], [address] [phone] Your Updated Medication List  
  
   
This list is accurate as of: 10/25/17  2:39 PM.  Always use your most recent med list.  
  
  
  
  
 ALEVE 220 mg tablet Generic drug:  naproxen sodium Take 220 mg by mouth daily. atorvastatin 10 mg tablet Commonly known as:  LIPITOR Take 1 Tab by mouth daily. ketoconazole 2 % topical cream  
Commonly known as:  NIZORAL Apply  to affected area daily. lisinopril 20 mg tablet Commonly known as:  Maribeth Wassermano Take 1 Tab by mouth daily. loratadine 10 mg tablet Commonly known as:  Kyle Mcdonnell Take 10 mg by mouth daily. Omeprazole delayed release 20 mg tablet Commonly known as:  PRILOSEC D/R Take 1 Tab by mouth daily. TART CHERRY PO Take  by mouth. Prescriptions Sent to Pharmacy Refills  
 ketoconazole (NIZORAL) 2 % topical cream 0 Sig: Apply  to affected area daily. Class: Normal  
 Pharmacy: [address] #: [phone] Route: Topical  
  
Follow-up Instructions Return in about 3 months (around 1/25/2018). Patient Instructions High Blood Pressure: Care Instructions Your Care Instructions If your blood pressure is usually above 140/90, you have high blood pressure, or hypertension. That means the top number is 140 or higher or the bottom number is 90 or higher, or both. Despite what a lot of people think, high blood pressure usually doesn't cause headaches or make you feel dizzy or lightheaded. It usually has no symptoms. But it does increase your risk for heart attack, stroke, and kidney or eye damage. The higher your blood pressure, the more your risk increases. Your doctor will give you a goal for your blood pressure. Your goal will be based on your health and your age. An example of a goal is to keep your blood pressure below 140/90. Lifestyle changes, such as eating healthy and being active, are always important to help lower blood pressure. You might also take medicine to reach your blood pressure goal. 
Follow-up care is a key part of your treatment and safety.  Be sure to make and go to all appointments, and call your doctor if you are having problems. It's also a good idea to know your test results and keep a list of the medicines you take. How can you care for yourself at home? Medical treatment · If you stop taking your medicine, your blood pressure will go back up. You may take one or more types of medicine to lower your blood pressure. Be safe with medicines. Take your medicine exactly as prescribed. Call your doctor if you think you are having a problem with your medicine. · Talk to your doctor before you start taking aspirin every day. Aspirin can help certain people lower their risk of a heart attack or stroke. But taking aspirin isn't right for everyone, because it can cause serious bleeding. · See your doctor regularly. You may need to see the doctor more often at first or until your blood pressure comes down. · If you are taking blood pressure medicine, talk to your doctor before you take decongestants or anti-inflammatory medicine, such as ibuprofen. Some of these medicines can raise blood pressure. · Learn how to check your blood pressure at home. Lifestyle changes · Stay at a healthy weight. This is especially important if you put on weight around the waist. Losing even 10 pounds can help you lower your blood pressure. · If your doctor recommends it, get more exercise. Walking is a good choice. Bit by bit, increase the amount you walk every day. Try for at least 30 minutes on most days of the week. You also may want to swim, bike, or do other activities. · Avoid or limit alcohol. Talk to your doctor about whether you can drink any alcohol. · Try to limit how much sodium you eat to less than 2,300 milligrams (mg) a day. Your doctor may ask you to try to eat less than 1,500 mg a day. · Eat plenty of fruits (such as bananas and oranges), vegetables, legumes, whole grains, and low-fat dairy products. · Lower the amount of saturated fat in your diet.  Saturated fat is found in animal products such as milk, cheese, and meat. Limiting these foods may help you lose weight and also lower your risk for heart disease. · Do not smoke. Smoking increases your risk for heart attack and stroke. If you need help quitting, talk to your doctor about stop-smoking programs and medicines. These can increase your chances of quitting for good. When should you call for help? Call 911 anytime you think you may need emergency care. This may mean having symptoms that suggest that your blood pressure is causing a serious heart or blood vessel problem. Your blood pressure may be over 180/110. For example, call 911 if: 
· You have symptoms of a heart attack. These may include: ¨ Chest pain or pressure, or a strange feeling in the chest. 
¨ Sweating. ¨ Shortness of breath. ¨ Nausea or vomiting. ¨ Pain, pressure, or a strange feeling in the back, neck, jaw, or upper belly or in one or both shoulders or arms. ¨ Lightheadedness or sudden weakness. ¨ A fast or irregular heartbeat. · You have symptoms of a stroke. These may include: 
¨ Sudden numbness, tingling, weakness, or loss of movement in your face, arm, or leg, especially on only one side of your body. ¨ Sudden vision changes. ¨ Sudden trouble speaking. ¨ Sudden confusion or trouble understanding simple statements. ¨ Sudden problems with walking or balance. ¨ A sudden, severe headache that is different from past headaches. · You have severe back or belly pain. Do not wait until your blood pressure comes down on its own. Get help right away. Call your doctor now or seek immediate care if: 
· Your blood pressure is much higher than normal (such as 180/110 or higher), but you don't have symptoms. · You think high blood pressure is causing symptoms, such as: ¨ Severe headache. ¨ Blurry vision. Watch closely for changes in your health, and be sure to contact your doctor if: · Your blood pressure measures 140/90 or higher at least 2 times. That means the top number is 140 or higher or the bottom number is 90 or higher, or both. · You think you may be having side effects from your blood pressure medicine. · Your blood pressure is usually normal, but it goes above normal at least 2 times. Where can you learn more? Go to http://pierre-shannan.info/. Enter R202 in the search box to learn more about \"High Blood Pressure: Care Instructions. \" Current as of: August 8, 2016 Content Version: 11.3 © 7509-3850 [employer]. Care instructions adapted under license by [employer] (which disclaims liability or warranty for this information). If you have questions about a medical condition or this instruction, always ask your healthcare professional. [address] any warranty or liability for your use of this information. DASH Diet: Care Instructions Your Care Instructions The DASH diet is an eating plan that can help lower your blood pressure. DASH stands for Dietary Approaches to Stop Hypertension. Hypertension is high blood pressure. The DASH diet focuses on eating foods that are high in calcium, potassium, and magnesium. These nutrients can lower blood pressure. The foods that are highest in these nutrients are fruits, vegetables, low-fat dairy products, nuts, seeds, and legumes. But taking calcium, potassium, and magnesium supplements instead of eating foods that are high in those nutrients does not have the same effect. The DASH diet also includes whole grains, fish, and poultry. The DASH diet is one of several lifestyle changes your doctor may recommend to lower your high blood pressure. Your doctor may also want you to decrease the amount of sodium in your diet. Lowering sodium while following the DASH diet can lower blood pressure even further than just the DASH diet alone. Follow-up care is a key part of your treatment and safety. Be sure to make and go to all appointments, and call your doctor if you are having problems. It's also a good idea to know your test results and keep a list of the medicines you take. How can you care for yourself at home? Following the DASH diet · Eat 4 to 5 servings of fruit each day. A serving is 1 medium-sized piece of fruit, ½ cup chopped or canned fruit, 1/4 cup dried fruit, or 4 ounces (½ cup) of fruit juice. Choose fruit more often than fruit juice. · Eat 4 to 5 servings of vegetables each day. A serving is 1 cup of lettuce or raw leafy vegetables, ½ cup of chopped or cooked vegetables, or 4 ounces (½ cup) of vegetable juice. Choose vegetables more often than vegetable juice. · Get 2 to 3 servings of low-fat and fat-free dairy each day. A serving is 8 ounces of milk, 1 cup of yogurt, or 1 ½ ounces of cheese. · Eat 6 to 8 servings of grains each day. A serving is 1 slice of bread, 1 ounce of dry cereal, or ½ cup of cooked rice, pasta, or cooked cereal. Try to choose whole-grain products as much as possible. · Limit lean meat, poultry, and fish to 2 servings each day. A serving is 3 ounces, about the size of a deck of cards. · Eat 4 to 5 servings of nuts, seeds, and legumes (cooked dried beans, lentils, and split peas) each week. A serving is 1/3 cup of nuts, 2 tablespoons of seeds, or ½ cup of cooked beans or peas. · Limit fats and oils to 2 to 3 servings each day. A serving is 1 teaspoon of vegetable oil or 2 tablespoons of salad dressing. · Limit sweets and added sugars to 5 servings or less a week. A serving is 1 tablespoon jelly or jam, ½ cup sorbet, or 1 cup of lemonade. · Eat less than 2,300 milligrams (mg) of sodium a day. If you limit your sodium to 1,500 mg a day, you can lower your blood pressure even more. Tips for success · Start small.  Do not try to make dramatic changes to your diet all at once. You might feel that you are missing out on your favorite foods and then be more likely to not follow the plan. Make small changes, and stick with them. Once those changes become habit, add a few more changes. · Try some of the following: ¨ Make it a goal to eat a fruit or vegetable at every meal and at snacks. This will make it easy to get the recommended amount of fruits and vegetables each day. ¨ Try yogurt topped with fruit and nuts for a snack or healthy dessert. ¨ Add lettuce, tomato, cucumber, and onion to sandwiches. ¨ Combine a ready-made pizza crust with low-fat mozzarella cheese and lots of vegetable toppings. Try using tomatoes, squash, spinach, broccoli, carrots, cauliflower, and onions. ¨ Have a variety of cut-up vegetables with a low-fat dip as an appetizer instead of chips and dip. ¨ Sprinkle sunflower seeds or chopped almonds over salads. Or try adding chopped walnuts or almonds to cooked vegetables. ¨ Try some vegetarian meals using beans and peas. Add garbanzo or kidney beans to salads. Make burritos and tacos with mashed barnett beans or black beans. Where can you learn more? Go to http://pierre-shannan.info/. Enter B820 in the search box to learn more about \"DASH Diet: Care Instructions. \" Current as of: April 3, 2017 Content Version: 11.3 © 8871-2640 [employer]. Care instructions adapted under license by [employer] (which disclaims liability or warranty for this information). If you have questions about a medical condition or this instruction, always ask your healthcare professional. [place] [address] any warranty or liability for your use of this information. Introducing [facility] & HEALTH SERVICES! [facility] introduces [employer] patient portal. Now you can access parts of your medical record, email your doctor's office, and request medication refills online.    
 
1. In your internet browser, go to [email or web address] 2. Click on the First Time User? Click Here link in the Sign In box. You will see the New Member Sign Up page. 3. Enter your Corvil Access Code exactly as it appears below. You will not need to use this code after youve completed the sign-up process. If you do not sign up before the expiration date, you must request a new code. · Cor[employer] Access Code: [email or web address] Expires: 12/22/2017 12:47 PM 
 
4. Enter the last four digits of your Social Security Number (xxxx) and Date of Birth (mm/dd/yyyy) as indicated and click Submit. You will be taken to the next sign-up page. 5. Create a Corvil ID. This will be your Corvil login ID and cannot be changed, so think of one that is secure and easy to remember. 6. Create a Corvil password. You can change your password at any time. 7. Enter your Password Reset Question and Answer. This can be used at a later time if you forget your password. 8. Enter your e-mail address. You will receive e-mail notification when new information is available in [address] 9. Click Sign Up. You can now view and download portions of your medical record. 10. Click the Download Summary menu link to download a portable copy of your medical information. If you have questions, please visit the Frequently Asked Questions section of the [employer] website. Remember, Corvil is NOT to be used for urgent needs. For medical emergencies, dial 911. Now available from your iPhone and Android! Please provide this summary of care documentation to your next provider. Your primary care clinician is listed as Daniel Rivas. If you have any questions after today's visit, please call [phone].

## 2017-11-29 DIAGNOSIS — K21.9 GASTROESOPHAGEAL REFLUX DISEASE WITHOUT ESOPHAGITIS: ICD-10-CM

## 2017-11-29 RX ORDER — PHENOL/SODIUM PHENOLATE
20 AEROSOL, SPRAY (ML) MUCOUS MEMBRANE DAILY
Qty: 90 TAB | Refills: 3 | Status: SHIPPED | OUTPATIENT
Start: 2017-11-29 | End: 2018-03-14 | Stop reason: SDUPTHER

## 2017-11-29 NOTE — TELEPHONE ENCOUNTER
Patient requesting 30 day supply for pharmacy and 90 day for mail in pharmacy    LOV:Wednesday, October 25, 2017

## 2017-12-26 NOTE — TELEPHONE ENCOUNTER
Pt requesting refill on Lisinopril 20mg 90 day supply sent to mail order pharmacy. Previously sent to [facility], needs changed.

## 2017-12-27 RX ORDER — LISINOPRIL 20 MG/1
20 TABLET ORAL DAILY
Qty: 90 TAB | Refills: 0 | Status: SHIPPED | OUTPATIENT
Start: 2017-12-27 | End: 2018-03-14 | Stop reason: SDUPTHER

## 2018-03-14 ENCOUNTER — OFFICE VISIT (OUTPATIENT)
Dept: FAMILY MEDICINE CLINIC | Age: 83
End: 2018-03-14

## 2018-03-14 VITALS
DIASTOLIC BLOOD PRESSURE: 84 MMHG | SYSTOLIC BLOOD PRESSURE: 180 MMHG | OXYGEN SATURATION: 94 % | WEIGHT: 159 LBS | HEART RATE: 96 BPM | TEMPERATURE: 98 F | HEIGHT: 60 IN | RESPIRATION RATE: 16 BRPM | BODY MASS INDEX: 31.22 KG/M2

## 2018-03-14 DIAGNOSIS — E78.2 MIXED HYPERLIPIDEMIA: ICD-10-CM

## 2018-03-14 DIAGNOSIS — K21.9 GASTROESOPHAGEAL REFLUX DISEASE WITHOUT ESOPHAGITIS: ICD-10-CM

## 2018-03-14 DIAGNOSIS — I10 ESSENTIAL HYPERTENSION: Primary | ICD-10-CM

## 2018-03-14 DIAGNOSIS — R01.1 MURMUR: ICD-10-CM

## 2018-03-14 RX ORDER — PHENOL/SODIUM PHENOLATE
20 AEROSOL, SPRAY (ML) MUCOUS MEMBRANE DAILY
Qty: 90 TAB | Refills: 3 | Status: SHIPPED | OUTPATIENT
Start: 2018-03-14 | End: 2018-09-13 | Stop reason: SDUPTHER

## 2018-03-14 RX ORDER — AMLODIPINE BESYLATE 5 MG/1
5 TABLET ORAL DAILY
Qty: 30 TAB | Refills: 11 | Status: SHIPPED | OUTPATIENT
Start: 2018-03-14 | End: 2019-02-14 | Stop reason: SDUPTHER

## 2018-03-14 RX ORDER — LISINOPRIL 20 MG/1
20 TABLET ORAL DAILY
Qty: 90 TAB | Refills: 0 | Status: SHIPPED | OUTPATIENT
Start: 2018-03-14 | End: 2018-08-23 | Stop reason: SDUPTHER

## 2018-03-14 RX ORDER — ATORVASTATIN CALCIUM 10 MG/1
10 TABLET, FILM COATED ORAL DAILY
Qty: 90 TAB | Refills: 3 | Status: SHIPPED | OUTPATIENT
Start: 2018-03-14 | End: 2018-12-17 | Stop reason: SDUPTHER

## 2018-03-14 NOTE — PROGRESS NOTES
1. Have you been to the ER, urgent care clinic since your last visit? Hospitalized since your last visit? No    2. Have you seen or consulted any other health care providers outside of the [address] [place] [address] since your last visit? Include any pap smears or colon screening.  Podiatrist    Chief Complaint   Patient presents with    Hypertension

## 2018-03-14 NOTE — PROGRESS NOTES
Chief Complaint   Patient presents with    Hypertension     she is a 80y.o. year old female who presents for evalution. Here to get refills  She is using tolnafate from the podiatrist for toenail fungus  She is not checking BP at home    Reviewed PmHx, RxHx, FmHx, SocHx, AllgHx and updated and dated in the chart. Aspirin yes ____   No____ N/A____    Patient Active Problem List    Diagnosis    Essential hypertension    Daytime somnolence    DDD (degenerative disc disease)    Hyperlipidemia       Nurse notes were reviewed and copied and are correct  Review of Systems - negative except as listed above in the HPI    Objective:     Vitals:    03/14/18 1350   BP: 180/84   Pulse: 96   Resp: 16   Temp: 98 °F (36.7 °C)   TempSrc: Oral   SpO2: 94%   Weight: 159 lb (72.1 kg)   Height: 5' (1.524 m)       Physical Examination: General appearance - alert, well appearing, and in no distress  Mental status - alert, oriented to person, place, and time  Neck - supple, no significant adenopathy  Lymphatics - no palpable lymphadenopathy, no hepatosplenomegaly  Chest - clear to auscultation, no wheezes, rales or rhonchi, symmetric air entry  Heart - normal rate and regular rhythm, systolic murmur 8-0/5 at 2nd left intercostal space and at 2nd right intercostal space      Assessment/ Plan:   Diagnoses and all orders for this visit:    1. Essential hypertension  -     amLODIPine (NORVASC) 5 mg tablet; Take 1 Tab by mouth daily. Add lisinopril 10 mg daily  2. Gastroesophageal reflux disease without esophagitis  -     Omeprazole delayed release (PRILOSEC D/R) 20 mg tablet; Take 1 Tab by mouth daily. 3. Mixed hyperlipidemia  -     atorvastatin (LIPITOR) 10 mg tablet; Take 1 Tab by mouth daily. No neg side effects, I do not want to increase the dose due to her age and risk of side effects  4. Murmur  stable  Other orders  -     lisinopril (PRINIVIL, ZESTRIL) 20 mg tablet; Take 1 Tab by mouth daily.        Follow-up Disposition: Not on File    ICD-10-CM ICD-9-CM    1. Essential hypertension I10 401.9 amLODIPine (NORVASC) 5 mg tablet      METABOLIC PANEL, COMPREHENSIVE   2. Gastroesophageal reflux disease without esophagitis K21.9 530.81 Omeprazole delayed release (PRILOSEC D/R) 20 mg tablet   3. Mixed hyperlipidemia E78.2 272.2 atorvastatin (LIPITOR) 10 mg tablet      LIPID PANEL   4. Murmur R01.1 785.2        I have discussed the diagnosis with the patient and the intended plan as seen in the above orders. The patient has received an after-visit summary and questions were answered concerning future plans. Medication Side Effects and Warnings were discussed with patient: yes  Patient Labs were reviewed and or requested: yes  Patient Past Records were reviewed and or requested: yes        There are no Patient Instructions on file for this visit.     The patient verbalizes understanding and agrees with the plan of care        Patient has the advanced directives booklet to review

## 2018-03-14 NOTE — MR AVS SNAPSHOT
[address] [facility] [address] 
[phone] Patient: Georgia Jensen MRN: [id number] TWX:[birth year] Visit Information Date & Time Provider Department Dept. Phone Encounter #  
 3/14/2018  1:30 PM Luordes Hanley MD [facility] [id number] Upcoming Health Maintenance Date Due ZOSTER VACCINE AGE 60> 5/4/1991 GLAUCOMA SCREENING Q2Y 7/4/1996 MEDICARE YEARLY EXAM 3/3/2018 Pneumococcal 65+ Low/Medium Risk (2 of 2 - PPSV23) 10/25/2018 DTaP/Tdap/Td series (2 - Td) 10/25/2027 Allergies as of 3/14/2018  Review Complete On: 3/14/2018 By: Lourdes Hanley MD  
  
 Severity Noted Reaction Type Reactions Egg Medium 05/08/2015   Intolerance Diarrhea, Nausea Only Other Plant, Animal, Environmental  03/14/2018    Runny Nose, Sneezing Current Immunizations  Reviewed on 10/20/2015 Name Date Influenza Vaccine (Madin [place] Canine Kidney) PF 9/20/2015, 8/14/2014 Influenza Vaccine (Quadrivalent) 10/19/2017 Tdap 10/25/2017 Not reviewed this visit You Were Diagnosed With   
  
 Codes Comments Essential hypertension    -  Primary ICD-10-CM: I10 
ICD-9-CM: 401.9 Gastroesophageal reflux disease without esophagitis     ICD-10-CM: K21.9 ICD-9-CM: 530.81 Mixed hyperlipidemia     ICD-10-CM: E78.2 ICD-9-CM: 272.2 Murmur     ICD-10-CM: R01.1 ICD-9-CM: 201. 2 Vitals BP Pulse Temp Resp Height(growth percentile) Weight(growth percentile) 180/84 96 98 °F (36.7 °C) (Oral) 16 5' (1.524 m) 159 lb (72.1 kg) SpO2 BMI OB Status Smoking Status 94% 31.05 kg/m2 Postmenopausal Former Smoker Vitals History BMI and BSA Data Body Mass Index Body Surface Area 31.05 kg/m 2 1.75 m 2 Preferred Pharmacy Pharmacy Name Phone [facility] [address] Your Updated Medication List  
  
   
This list is accurate as of 3/14/18  2:24 PM.  Always use your most recent med list.  
  
  
  
  
 ALEVE 220 mg tablet Generic drug:  naproxen sodium Take 220 mg by mouth daily. amLODIPine 5 mg tablet Commonly known as:  Singh Jimmy Take 1 Tab by mouth daily. atorvastatin 10 mg tablet Commonly known as:  LIPITOR Take 1 Tab by mouth daily. ketoconazole 2 % topical cream  
Commonly known as:  NIZORAL Apply  to affected area daily. lisinopril 20 mg tablet Commonly known as:  Knott Boor Take 1 Tab by mouth daily. loratadine 10 mg tablet Commonly known as:  Michaeline Haven Take 10 mg by mouth daily. Omeprazole delayed release 20 mg tablet Commonly known as:  PRILOSEC D/R Take 1 Tab by mouth daily. TART CHERRY PO Take  by mouth. Prescriptions Sent to Pharmacy Refills  
 lisinopril (PRINIVIL, ZESTRIL) 20 mg tablet 0 Sig: Take 1 Tab by mouth daily. Class: Normal  
 Pharmacy: [facility] [address] [place] [address]. Ph #: [phone] Route: Oral  
 Omeprazole delayed release (PRILOSEC D/R) 20 mg tablet 3 Sig: Take 1 Tab by mouth daily. Class: Normal  
 Pharmacy: [facility] [address] [place] [address]. Ph #: [phone] Route: Oral  
 atorvastatin (LIPITOR) 10 mg tablet 3 Sig: Take 1 Tab by mouth daily. Class: Normal  
 Pharmacy: [facility] [address] [place] [address]. Ph #: [phone] Route: Oral  
 amLODIPine (NORVASC) 5 mg tablet 11 Sig: Take 1 Tab by mouth daily. Class: Normal  
 Pharmacy: [facility] [address] [place] [address]. Ph #: [phone] Route: Oral  
  
We Performed the Following LIPID PANEL [82102 CPT(R)] METABOLIC PANEL, COMPREHENSIVE [68834 CPT(R)] Introducing \[facility] [place] [facility]\"" & HEALTH SERVICES!    
 [facility] introduces [employer] patient portal. Now you can access parts of your medical record, email your doctor's office, and request medication refills online. 1. In your internet browser, go to [email or web address] 2. Click on the First Time User? Click Here link in the Sign In box. You will see the New Member Sign Up page. 3. Enter your [employer] Access Code exactly as it appears below. You will not need to use this code after youve completed the sign-up process. If you do not sign up before the expiration date, you must request a new code. · [employer] Access Code: [email or web address] Expires: 6/12/2018  2:24 PM 
 
4. Enter the last four digits of your Social Security Number (xxxx) and Date of Birth (mm/dd/yyyy) as indicated and click Submit. You will be taken to the next sign-up page. 5. Create a [employer] ID. This will be your [employer] login ID and cannot be changed, so think of one that is secure and easy to remember. 6. Create a [employer] password. You can change your password at any time. 7. Enter your Password Reset Question and Answer. This can be used at a later time if you forget your password. 8. Enter your e-mail address. You will receive e-mail notification when new information is available in [address] 9. Click Sign Up. You can now view and download portions of your medical record. 10. Click the Download Summary menu link to download a portable copy of your medical information. If you have questions, please visit the Frequently Asked Questions section of the [employer] website. Remember, [employer] is NOT to be used for urgent needs. For medical emergencies, dial 911. Now available from your iPhone and Android! Please provide this summary of care documentation to your next provider. Your primary care clinician is listed as Nahiddanie Yao. If you have any questions after today's visit, please call [phone].

## 2018-03-15 LAB
ALBUMIN SERPL-MCNC: 4 G/DL (ref 3.5–4.7)
ALBUMIN/GLOB SERPL: 1.5 {RATIO} (ref 1.2–2.2)
ALP SERPL-CCNC: 112 IU/L (ref 39–117)
ALT SERPL-CCNC: 21 IU/L (ref 0–32)
AST SERPL-CCNC: 18 IU/L (ref 0–40)
BILIRUB SERPL-MCNC: 0.3 MG/DL (ref 0–1.2)
BUN SERPL-MCNC: 13 MG/DL (ref 8–27)
BUN/CREAT SERPL: 14 (ref 12–28)
CALCIUM SERPL-MCNC: 9.4 MG/DL (ref 8.7–10.3)
CHLORIDE SERPL-SCNC: 104 MMOL/L (ref 96–106)
CHOLEST SERPL-MCNC: 217 MG/DL (ref 100–199)
CO2 SERPL-SCNC: 27 MMOL/L (ref 18–29)
CREAT SERPL-MCNC: 0.94 MG/DL (ref 0.57–1)
GFR SERPLBLD CREATININE-BSD FMLA CKD-EPI: 55 ML/MIN/1.73
GFR SERPLBLD CREATININE-BSD FMLA CKD-EPI: 64 ML/MIN/1.73
GLOBULIN SER CALC-MCNC: 2.6 G/DL (ref 1.5–4.5)
GLUCOSE SERPL-MCNC: 98 MG/DL (ref 65–99)
HDLC SERPL-MCNC: 64 MG/DL
INTERPRETATION, 910389: NORMAL
INTERPRETATION: NORMAL
LDLC SERPL CALC-MCNC: 111 MG/DL (ref 0–99)
PDF IMAGE, 910387: NORMAL
POTASSIUM SERPL-SCNC: 4.4 MMOL/L (ref 3.5–5.2)
PROT SERPL-MCNC: 6.6 G/DL (ref 6–8.5)
SODIUM SERPL-SCNC: 143 MMOL/L (ref 134–144)
TRIGL SERPL-MCNC: 208 MG/DL (ref 0–149)
VLDLC SERPL CALC-MCNC: 42 MG/DL (ref 5–40)

## 2018-03-19 ENCOUNTER — DOCUMENTATION ONLY (OUTPATIENT)
Dept: FAMILY MEDICINE CLINIC | Age: 83
End: 2018-03-19

## 2018-03-22 NOTE — PROGRESS NOTES
The liver and kidney test was normal  The cholesterol levels are much higher than it was a year ago.  Try to avoid junk food , fast food and fried food

## 2018-03-26 NOTE — PROGRESS NOTES
Spoke with patient and advised of lab results. Patient verbalized understanding and had no questions at this time. She states that she eats out all of the time.

## 2018-06-14 ENCOUNTER — OFFICE VISIT (OUTPATIENT)
Dept: FAMILY MEDICINE CLINIC | Age: 83
End: 2018-06-14

## 2018-06-14 ENCOUNTER — TELEPHONE (OUTPATIENT)
Dept: FAMILY MEDICINE CLINIC | Age: 83
End: 2018-06-14

## 2018-06-14 VITALS
HEART RATE: 80 BPM | TEMPERATURE: 98.4 F | BODY MASS INDEX: 30.86 KG/M2 | WEIGHT: 157.2 LBS | HEIGHT: 60 IN | DIASTOLIC BLOOD PRESSURE: 78 MMHG | OXYGEN SATURATION: 97 % | SYSTOLIC BLOOD PRESSURE: 147 MMHG | RESPIRATION RATE: 16 BRPM

## 2018-06-14 DIAGNOSIS — E78.5 HYPERLIPIDEMIA, UNSPECIFIED HYPERLIPIDEMIA TYPE: ICD-10-CM

## 2018-06-14 DIAGNOSIS — Z71.89 ADVANCED DIRECTIVES, COUNSELING/DISCUSSION: ICD-10-CM

## 2018-06-14 DIAGNOSIS — I10 ESSENTIAL HYPERTENSION: ICD-10-CM

## 2018-06-14 DIAGNOSIS — Z00.00 MEDICARE ANNUAL WELLNESS VISIT, SUBSEQUENT: Primary | ICD-10-CM

## 2018-06-14 NOTE — PROGRESS NOTES
This is the Subsequent Medicare Annual Wellness Exam, performed 12 months or more after the Initial AWV or the last Subsequent AWV    I have reviewed the patient's medical history in detail and updated the computerized patient record. 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, episode of care unspecified     Allergic rhinitis, cause unspecified     Brachial neuritis or radiculitis NOS     Cervicalgia     Disorder of bone and cartilage, unspecified     Dizziness and giddiness     Esophageal reflux     Hemorrhoid     Hypercholesterolemia     Insomnia, unspecified     Osteoarthrosis, unspecified whether generalized or localized, unspecified site     Osteoporosis, unspecified     Other abnormal blood chemistry     Other and unspecified hyperlipidemia     Pain in joint, forearm     Pain in joint, pelvic region and thigh     Pain in joint, shoulder region     Unspecified essential hypertension     Unspecified transient cerebral ischemia     Unspecified vitamin D deficiency       Past Surgical History:   Procedure Laterality Date    HX BUNIONECTOMY      HX CATARACT REMOVAL Bilateral     HX CHOLECYSTECTOMY      HX PARTIAL HYSTERECTOMY      HX TUBAL LIGATION       Current Outpatient Prescriptions   Medication Sig Dispense Refill    lisinopril (PRINIVIL, ZESTRIL) 20 mg tablet Take 1 Tab by mouth daily. 90 Tab 0    Omeprazole delayed release (PRILOSEC D/R) 20 mg tablet Take 1 Tab by mouth daily. 90 Tab 3    atorvastatin (LIPITOR) 10 mg tablet Take 1 Tab by mouth daily. 90 Tab 3    amLODIPine (NORVASC) 5 mg tablet Take 1 Tab by mouth daily. 30 Tab 11    ketoconazole (NIZORAL) 2 % topical cream Apply  to affected area daily. 15 g 0    VIT C/CHERRY & CELERY EX/GRP E (TART CHERRY PO) Take  by mouth.  loratadine (CLARITIN) 10 mg tablet Take 10 mg by mouth daily.       naproxen sodium (ALEVE) 220 mg tablet Take 220 mg by mouth daily. Allergies   Allergen Reactions    Egg Diarrhea and Nausea Only    Other Plant, Animal, Environmental Runny Nose and Sneezing     Family History   Problem Relation Age of Onset    Cancer Other     Stroke Mother     Stroke Father     Heart Disease Sister     Heart Disease Brother     Stroke Brother      Social History   Substance Use Topics    Smoking status: Former Smoker     Types: Cigarettes     Quit date: 3/3/1995    Smokeless tobacco: Never Used    Alcohol use 14.0 oz/week     28 Standard drinks or equivalent per week      Comment: beer and wine occ     Patient Active Problem List   Diagnosis Code    Daytime somnolence R40.0    DDD (degenerative disc disease) [id number]    Hyperlipidemia E78.5    Essential hypertension I10       Depression Risk Factor Screening:     PHQ over the last two weeks 6/14/2018   Little interest or pleasure in doing things Not at all   Feeling down, depressed or hopeless Not at all   Total Score PHQ 2 0     Alcohol Risk Factor Screening:   Drinks daily beer sometimes more than 4    Functional Ability and Level of Safety:   Hearing Loss  Hearing is good. Activities of Daily Living  The home contains: grab bars in the bathroom  Patient does total self care    Fall Risk  Fall Risk Assessment, last 12 mths 6/14/2018   Able to walk? Yes   Fall in past 12 months?  No   Fall with injury? -   Number of falls in past 12 months -   Fall Risk Score -       Abuse Screen  Patient is not abused    Cognitive Screening   Evaluation of Cognitive Function:  Has your family/caregiver stated any concerns about your memory: no  Normal    Patient Care Team   Patient Care Team:  Denise Bañuelos MD as PCP - General (Family Practice)  Jonna Boswell MD (Orthopedic Surgery)    Assessment/Plan   Education and counseling provided:  Are appropriate based on today's review and evaluation  End-of-Life planning (with patient's consent)        Health Maintenance Due   Topic Date Due    ZOSTER VACCINE AGE 60>  05/04/1991    GLAUCOMA SCREENING Q2Y  07/04/1996

## 2018-06-14 NOTE — PATIENT INSTRUCTIONS
Medicare Wellness Visit, Female    The best way to live healthy is to have a lifestyle where you eat a well-balanced diet, exercise regularly, limit alcohol use, and quit all forms of tobacco/nicotine, if applicable. Regular preventive services are another way to keep healthy. Preventive services (vaccines, screening tests, monitoring & exams) can help personalize your care plan, which helps you manage your own care. Screening tests can find health problems at the earliest stages, when they are easiest to treat. [facility] follows the current, evidence-based guidelines published by the [facility] (RUSTSTF) when recommending preventive services for our patients. Because we follow these guidelines, sometimes recommendations change over time as research supports it. (For example, mammograms used to be recommended annually. Even though Medicare will still pay for an annual mammogram, the newer guidelines recommend a mammogram every two years for women of average risk.)    Of course, you and your provider may decide to screen more often for some diseases, based on your risk and co-morbidities (chronic disease you are already diagnosed with). Preventive services for you include:    - Medicare offers their members a free annual wellness visit, which is time for you and your primary care provider to discuss and plan for your preventive service needs. Take advantage of this benefit every year!    -All people over age 72 should receive the recommended pneumonia vaccines. Current USPSTF guidelines recommend a series of two vaccines for the best pneumonia protection.     -All adults should have a yearly flu vaccine and a tetanus vaccine every 10 years. All adults age 61 years should receive a shingles vaccine once in their lifetime.      -A bone mass density test is recommended when a woman turns 65 to screen for osteoporosis.  This test is only recommended once as a screening. Some providers will use this same test as a disease monitoring tool if you already have osteoporosis. -All adults age 38-68 years who are overweight should have a diabetes screening test once every three years.     -Other screening tests & preventive services for persons with diabetes include: an eye exam to screen for diabetic retinopathy, a kidney function test, a foot exam, and stricter control over your cholesterol.     -Cardiovascular screening for adults with routine risk involves an electrocardiogram (ECG) at intervals determined by the provider.     -Colorectal cancer screenings should be done for adults age 54-65 years with normal risk. There are a number of acceptable methods of screening for this type of cancer. Each test has its own benefits and drawbacks. Discuss with your provider what is most appropriate for you during your annual wellness visit. The different tests include: colonoscopy (considered the best screening method), a fecal occult blood test, a fecal DNA test, and sigmoidoscopy. -Breast cancer screenings are recommended every other year for women of normal risk age 54-69 years.     -Cervical cancer screenings for women over age 72 are only recommended with certain risk factors.     -All adults born between [facility] should be screened once for Hepatitis C. Here is a list of your current Health Maintenance items (your personalized list of preventive services) with a due date:  Health Maintenance Due   Topic Date Due    Shingles Vaccine  05/04/1991    Glaucoma Screening   07/04/1996        Well Visit, Over 72: Care Instructions  Your Care Instructions    Physical exams can help you stay healthy. Your doctor has checked your overall health and may have suggested ways to take good care of yourself. He or she also may have recommended tests. At home, you can help prevent illness with healthy eating, regular exercise, and other steps.   Follow-up care is a key part of your treatment and safety. Be sure to make and go to all appointments, and call your doctor if you are having problems. It's also a good idea to know your test results and keep a list of the medicines you take. How can you care for yourself at home? · Reach and stay at a healthy weight. This will lower your risk for many problems, such as obesity, diabetes, heart disease, and high blood pressure. · Get at least 30 minutes of exercise on most days of the week. Walking is a good choice. You also may want to do other activities, such as running, swimming, cycling, or playing tennis or team sports. · Do not smoke. Smoking can make health problems worse. If you need help quitting, talk to your doctor about stop-smoking programs and medicines. These can increase your chances of quitting for good. · Protect your skin from too much sun. When you're outdoors from 10 a.m. to 4 p.m., stay in the shade or cover up with clothing and a hat with a wide brim. Wear sunglasses that block UV rays. Even when it's cloudy, put broad-spectrum sunscreen (SPF 30 or higher) on any exposed skin. · See a dentist one or two times a year for checkups and to have your teeth cleaned. · Wear a seat belt in the car. · Limit alcohol to 2 drinks a day for men and 1 drink a day for women. Too much alcohol can cause health problems. Follow your doctor's advice about when to have certain tests. These tests can spot problems early. For men and women  · Cholesterol. Your doctor will tell you how often to have this done based on your overall health and other things that can increase your risk for heart attack and stroke. · Blood pressure. Have your blood pressure checked during a routine doctor visit. Your doctor will tell you how often to check your blood pressure based on your age, your blood pressure results, and other factors. · Diabetes. Ask your doctor whether you should have tests for diabetes. · Vision.  Experts recommend that you have yearly exams for glaucoma and other age-related eye problems. · Hearing. Tell your doctor if you notice any change in your hearing. You can have tests to find out how well you hear. · Colon cancer tests. Keep having colon cancer tests as your doctor recommends. You can have one of several types of tests. · Heart attack and stroke risk. At least every 4 to 6 years, you should have your risk for heart attack and stroke assessed. Your doctor uses factors such as your age, blood pressure, cholesterol, and whether you smoke or have diabetes to show what your risk for a heart attack or stroke is over the next 10 years. · Osteoporosis. Talk to your doctor about whether you should have a bone density test to find out whether you have thinning bones. Also ask your doctor about whether you should take calcium and vitamin D supplements. For women  · Pap test and pelvic exam. You may no longer need a Pap test. Talk with your doctor about whether to stop or continue to have Pap tests. · Breast exam and mammogram. Ask how often you should have a mammogram, which is an X-ray of your breasts. A mammogram can spot breast cancer before it can be felt and when it is easiest to treat. · Thyroid disease. Talk to your doctor about whether to have your thyroid checked as part of a regular physical exam. Women have an increased chance of a thyroid problem. For men  · Prostate exam. Talk to your doctor about whether you should have a blood test (called a PSA test) for prostate cancer. Experts disagree on whether men should have this test. Some experts recommend that you discuss the benefits and risks of the test with your doctor. · Abdominal aortic aneurysm. Ask your doctor whether you should have a test to check for an aneurysm. You may need a test if you ever smoked or if your parent, brother, sister, or child has had an aneurysm. When should you call for help?   Watch closely for changes in your health, and be sure to contact your doctor if you have any problems or symptoms that concern you. Where can you learn more? Go to http://pierre-shannan.info/. Enter U232 in the search box to learn more about \"Well Visit, Over 65: Care Instructions. \"  Current as of: May 12, 2017  Content Version: 11.4  © 2803-2069 [employer]. Care instructions adapted under license by [employer] (which disclaims liability or warranty for this information). If you have questions about a medical condition or this instruction, always ask your healthcare professional. [address] any warranty or liability for your use of this information.

## 2018-06-14 NOTE — ACP (ADVANCE CARE PLANNING)
Advance Care Planning    Advance Care Planning (ACP) Provider Conversation Snapshot    Date of ACP Conversation: 06/14/18  Persons included in Conversation:  patient  Length of ACP Conversation in minutes:  16 minutes    Authorized Decision Maker (if patient is incapable of making informed decisions): This person is:   Healthcare Agent/Medical Power of  under Advance Directive          For Patients with Decision Making Capacity:   Values/Goals: Exploration of values, goals, and preferences if recovery is not expected, even with continued medical treatment in the event of:  Imminent death  Severe, permanent brain injury  \"In these circumstances, what matters most to you? \"  Care focused more on comfort or quality of life.     Conversation Outcomes / Follow-Up Plan:   Reviewed existing Advance Directive

## 2018-06-14 NOTE — PROGRESS NOTES
1. Have you been to the ER, urgent care clinic since your last visit? Hospitalized since your last visit? No    2. Have you seen or consulted any other health care providers outside of the [address] [place] [address] since your last visit? Include any pap smears or colon screening.  No     Chief Complaint   Patient presents with   Johny Martinez Annual Wellness Visit

## 2018-06-14 NOTE — MR AVS SNAPSHOT
[address] [facility] [address] 
[phone] Patient: Paul Guevara MRN: [id number] KVN:7/4/6212 Visit Information Date & Time Provider Department Dept. Phone Encounter #  
 6/14/2018  1:00 PM Mikie Mojica MD [address] [place] [address] [id number] Follow-up Instructions Return in about 3 months (around 9/14/2018), or if symptoms worsen or fail to improve. Upcoming Health Maintenance Date Due ZOSTER VACCINE AGE 60> 5/4/1991 GLAUCOMA SCREENING Q2Y 7/4/1996 Influenza Age 5 to Adult 8/1/2018 Pneumococcal 65+ Low/Medium Risk (2 of 2 - PPSV23) 10/25/2018 MEDICARE YEARLY EXAM 6/15/2019 DTaP/Tdap/Td series (2 - Td) 10/25/2027 Allergies as of 6/14/2018  Review Complete On: 6/14/2018 By: Mikie Mojica MD  
  
 Severity Noted Reaction Type Reactions Egg Medium 05/08/2015   Intolerance Diarrhea, Nausea Only Other Plant, Animal, Environmental  03/14/2018    Runny Nose, Sneezing Current Immunizations  Reviewed on 10/20/2015 Name Date Influenza Vaccine (Madin [place] Canine Kidney) PF 9/20/2015, 8/14/2014 Influenza Vaccine (Quadrivalent) 10/19/2017 Tdap 10/25/2017 Not reviewed this visit You Were Diagnosed With   
  
 Codes Comments Medicare annual wellness visit, subsequent     ICD-10-CM: Z00.00 ICD-9-CM: V70.0 Advanced directives, counseling/discussion     ICD-10-CM: Z71.89 ICD-9-CM: V65.49 Vitals BP Pulse Temp Resp Height(growth percentile) Weight(growth percentile) 147/78 80 98.4 °F (36.9 °C) (Oral) 16 5' (1.524 m) 157 lb 3.2 oz (71.3 kg) SpO2 BMI OB Status Smoking Status 97% 30.7 kg/m2 Postmenopausal Former Smoker Vitals History BMI and BSA Data Body Mass Index Body Surface Area 30.7 kg/m 2 1.74 m 2 Preferred Pharmacy Pharmacy Name Phone [facility], [address] [place] [address] 2 23 [facility] Your Updated Medication List  
  
   
This list is accurate as of 6/14/18  1:32 PM.  Always use your most recent med list.  
  
  
  
  
 ALEVE 220 mg tablet Generic drug:  naproxen sodium Take 220 mg by mouth daily. amLODIPine 5 mg tablet Commonly known as:  Himanshu Lute Take 1 Tab by mouth daily. atorvastatin 10 mg tablet Commonly known as:  LIPITOR Take 1 Tab by mouth daily. ketoconazole 2 % topical cream  
Commonly known as:  NIZORAL Apply  to affected area daily. lisinopril 20 mg tablet Commonly known as:  Lollie Jump Take 1 Tab by mouth daily. loratadine 10 mg tablet Commonly known as:  Gennett Caroli Take 10 mg by mouth daily. Omeprazole delayed release 20 mg tablet Commonly known as:  PRILOSEC D/R Take 1 Tab by mouth daily. TART CHERRY PO Take  by mouth. We Performed the Following ADVANCE CARE PLANNING FIRST 30 MINS [84732 CPT(R)] Follow-up Instructions Return in about 3 months (around 9/14/2018), or if symptoms worsen or fail to improve. Patient Instructions Medicare Wellness Visit, Female The best way to live healthy is to have a lifestyle where you eat a well-balanced diet, exercise regularly, limit alcohol use, and quit all forms of tobacco/nicotine, if applicable. Regular preventive services are another way to keep healthy. Preventive services (vaccines, screening tests, monitoring & exams) can help personalize your care plan, which helps you manage your own care. Screening tests can find health problems at the earliest stages, when they are easiest to treat. [facility] follows the current, evidence-based guidelines published by the [place] (USPSTF) when recommending preventive services for our patients.  Because we follow these guidelines, sometimes recommendations change over time as research supports it. (For example, mammograms used to be recommended annually. Even though Medicare will still pay for an annual mammogram, the newer guidelines recommend a mammogram every two years for women of average risk.) Of course, you and your provider may decide to screen more often for some diseases, based on your risk and co-morbidities (chronic disease you are already diagnosed with). Preventive services for you include: - Medicare offers their members a free annual wellness visit, which is time for you and your primary care provider to discuss and plan for your preventive service needs. Take advantage of this benefit every year! 
 
-All people over age 72 should receive the recommended pneumonia vaccines. Current USPSTF guidelines recommend a series of two vaccines for the best pneumonia protection.  
 
-All adults should have a yearly flu vaccine and a tetanus vaccine every 10 years. All adults age 61 years should receive a shingles vaccine once in their lifetime.   
 
-A bone mass density test is recommended when a woman turns 65 to screen for osteoporosis. This test is only recommended once as a screening. Some providers will use this same test as a disease monitoring tool if you already have osteoporosis. -All adults age 38-68 years who are overweight should have a diabetes screening test once every three years.  
 
-Other screening tests & preventive services for persons with diabetes include: an eye exam to screen for diabetic retinopathy, a kidney function test, a foot exam, and stricter control over your cholesterol.  
 
-Cardiovascular screening for adults with routine risk involves an electrocardiogram (ECG) at intervals determined by the provider.  
 
-Colorectal cancer screenings should be done for adults age 54-65 years with normal risk.  There are a number of acceptable methods of screening for this type of cancer. Each test has its own benefits and drawbacks. Discuss with your provider what is most appropriate for you during your annual wellness visit. The different tests include: colonoscopy (considered the best screening method), a fecal occult blood test, a fecal DNA test, and sigmoidoscopy. -Breast cancer screenings are recommended every other year for women of normal risk age 54-69 years.  
 
-Cervical cancer screenings for women over age 72 are only recommended with certain risk factors.  
 
-All adults born between [facility] should be screened once for Hepatitis C. Here is a list of your current Health Maintenance items (your personalized list of preventive services) with a due date: 
Health Maintenance Due Topic Date Due  Shingles Vaccine  05/04/1991  Glaucoma Screening   07/04/1996 Well Visit, Over 72: Care Instructions Your Care Instructions Physical exams can help you stay healthy. Your doctor has checked your overall health and may have suggested ways to take good care of yourself. He or she also may have recommended tests. At home, you can help prevent illness with healthy eating, regular exercise, and other steps. Follow-up care is a key part of your treatment and safety. Be sure to make and go to all appointments, and call your doctor if you are having problems. It's also a good idea to know your test results and keep a list of the medicines you take. How can you care for yourself at home? · Reach and stay at a healthy weight. This will lower your risk for many problems, such as obesity, diabetes, heart disease, and high blood pressure. · Get at least 30 minutes of exercise on most days of the week. Walking is a good choice. You also may want to do other activities, such as running, swimming, cycling, or playing tennis or team sports. · Do not smoke. Smoking can make health problems worse.  If you need help quitting, talk to your doctor about stop-smoking programs and medicines. These can increase your chances of quitting for good. · Protect your skin from too much sun. When you're outdoors from 10 a.m. to 4 p.m., stay in the shade or cover up with clothing and a hat with a wide brim. Wear sunglasses that block UV rays. Even when it's cloudy, put broad-spectrum sunscreen (SPF 30 or higher) on any exposed skin. · See a dentist one or two times a year for checkups and to have your teeth cleaned. · Wear a seat belt in the car. · Limit alcohol to 2 drinks a day for men and 1 drink a day for women. Too much alcohol can cause health problems. Follow your doctor's advice about when to have certain tests. These tests can spot problems early. For men and women · Cholesterol. Your doctor will tell you how often to have this done based on your overall health and other things that can increase your risk for heart attack and stroke. · Blood pressure. Have your blood pressure checked during a routine doctor visit. Your doctor will tell you how often to check your blood pressure based on your age, your blood pressure results, and other factors. · Diabetes. Ask your doctor whether you should have tests for diabetes. · Vision. Experts recommend that you have yearly exams for glaucoma and other age-related eye problems. · Hearing. Tell your doctor if you notice any change in your hearing. You can have tests to find out how well you hear. · Colon cancer tests. Keep having colon cancer tests as your doctor recommends. You can have one of several types of tests. · Heart attack and stroke risk. At least every 4 to 6 years, you should have your risk for heart attack and stroke assessed. Your doctor uses factors such as your age, blood pressure, cholesterol, and whether you smoke or have diabetes to show what your risk for a heart attack or stroke is over the next 10 years. · Osteoporosis. Talk to your doctor about whether you should have a bone density test to find out whether you have thinning bones. Also ask your doctor about whether you should take calcium and vitamin D supplements. For women · Pap test and pelvic exam. You may no longer need a Pap test. Talk with your doctor about whether to stop or continue to have Pap tests. · Breast exam and mammogram. Ask how often you should have a mammogram, which is an X-ray of your breasts. A mammogram can spot breast cancer before it can be felt and when it is easiest to treat. · Thyroid disease. Talk to your doctor about whether to have your thyroid checked as part of a regular physical exam. Women have an increased chance of a thyroid problem. For men · Prostate exam. Talk to your doctor about whether you should have a blood test (called a PSA test) for prostate cancer. Experts disagree on whether men should have this test. Some experts recommend that you discuss the benefits and risks of the test with your doctor. · Abdominal aortic aneurysm. Ask your doctor whether you should have a test to check for an aneurysm. You may need a test if you ever smoked or if your parent, brother, sister, or child has had an aneurysm. When should you call for help? Watch closely for changes in your health, and be sure to contact your doctor if you have any problems or symptoms that concern you. Where can you learn more? Go to http://pierre-shannan.info/. Enter K236 in the search box to learn more about \"Well Visit, Over 65: Care Instructions. \" Current as of: May 12, 2017 Content Version: 11.4 © 2368-2048 [employer]. Care instructions adapted under license by [employer] (which disclaims liability or warranty for this information).  If you have questions about a medical condition or this instruction, always ask your healthcare professional. [employer] disclaims any warranty or liability for your use of this information. Introducing [facility] & HEALTH SERVICES! Elis Troyes introduces Heatwave Interactive patient portal. Now you can access parts of your medical record, email your doctor's office, and request medication refills online. 1. In your internet browser, go to [email or web address] 2. Click on the First Time User? Click Here link in the Sign In box. You will see the New Member Sign Up page. 3. Enter your Heatwave Interactive Access Code exactly as it appears below. You will not need to use this code after youve completed the sign-up process. If you do not sign up before the expiration date, you must request a new code. · Heatwave Interactive Access Code: [email or web address] Expires: 9/12/2018  1:32 PM 
 
4. Enter the last four digits of your Social Security Number (xxxx) and Date of Birth (mm/dd/yyyy) as indicated and click Submit. You will be taken to the next sign-up page. 5. Create a Heatwave Interactive ID. This will be your Heatwave Interactive login ID and cannot be changed, so think of one that is secure and easy to remember. 6. Create a Heatwave Interactive password. You can change your password at any time. 7. Enter your Password Reset Question and Answer. This can be used at a later time if you forget your password. 8. Enter your e-mail address. You will receive e-mail notification when new information is available in [address] 9. Click Sign Up. You can now view and download portions of your medical record. 10. Click the Download Summary menu link to download a portable copy of your medical information. If you have questions, please visit the Frequently Asked Questions section of the Heatwave Interactive website. Remember, Heatwave Interactive is NOT to be used for urgent needs. For medical emergencies, dial 911. Now available from your iPhone and Android! Please provide this summary of care documentation to your next provider. Your primary care clinician is listed as Liyah Richardson. If you have any questions after today's visit, please call [phone].

## 2018-06-15 LAB
ALBUMIN SERPL-MCNC: 4.3 G/DL (ref 3.5–4.7)
ALBUMIN/GLOB SERPL: 2 {RATIO} (ref 1.2–2.2)
ALP SERPL-CCNC: 98 IU/L (ref 39–117)
ALT SERPL-CCNC: 19 IU/L (ref 0–32)
AST SERPL-CCNC: 21 IU/L (ref 0–40)
BILIRUB SERPL-MCNC: 0.2 MG/DL (ref 0–1.2)
BUN SERPL-MCNC: 15 MG/DL (ref 8–27)
BUN/CREAT SERPL: 18 (ref 12–28)
CALCIUM SERPL-MCNC: 9.4 MG/DL (ref 8.7–10.3)
CHLORIDE SERPL-SCNC: 102 MMOL/L (ref 96–106)
CHOLEST SERPL-MCNC: 191 MG/DL (ref 100–199)
CO2 SERPL-SCNC: 20 MMOL/L (ref 20–29)
CREAT SERPL-MCNC: 0.83 MG/DL (ref 0.57–1)
GFR SERPLBLD CREATININE-BSD FMLA CKD-EPI: 64 ML/MIN/1.73
GFR SERPLBLD CREATININE-BSD FMLA CKD-EPI: 74 ML/MIN/1.73
GLOBULIN SER CALC-MCNC: 2.2 G/DL (ref 1.5–4.5)
GLUCOSE SERPL-MCNC: 95 MG/DL (ref 65–99)
HDLC SERPL-MCNC: 74 MG/DL
INTERPRETATION, 910389: NORMAL
LDLC SERPL CALC-MCNC: 93 MG/DL (ref 0–99)
POTASSIUM SERPL-SCNC: 4.8 MMOL/L (ref 3.5–5.2)
PROT SERPL-MCNC: 6.5 G/DL (ref 6–8.5)
SODIUM SERPL-SCNC: 142 MMOL/L (ref 134–144)
TRIGL SERPL-MCNC: 119 MG/DL (ref 0–149)
VLDLC SERPL CALC-MCNC: 24 MG/DL (ref 5–40)

## 2018-08-23 RX ORDER — LISINOPRIL 20 MG/1
20 TABLET ORAL DAILY
Qty: 90 TAB | Refills: 3 | Status: SHIPPED | OUTPATIENT
Start: 2018-08-23 | End: 2018-08-24 | Stop reason: SDUPTHER

## 2018-08-23 RX ORDER — LISINOPRIL 20 MG/1
20 TABLET ORAL DAILY
Qty: 90 TAB | Refills: 0 | Status: CANCELLED | OUTPATIENT
Start: 2018-08-23

## 2018-08-23 NOTE — TELEPHONE ENCOUNTER
Patient needs 30 day supply to go to her local [facility]   And her 90day Rx to be mailed to her so that she can mail it herself into her mail order Rx pharmacy.

## 2018-08-24 DIAGNOSIS — I10 ESSENTIAL HYPERTENSION: Primary | ICD-10-CM

## 2018-08-24 RX ORDER — LISINOPRIL 20 MG/1
20 TABLET ORAL DAILY
Qty: 90 TAB | Refills: 3 | Status: SHIPPED | OUTPATIENT
Start: 2018-08-24

## 2018-08-24 NOTE — TELEPHONE ENCOUNTER
LVM advising pt that Rx for mail order pharmacy was available for  and an additional 30 day supply of medication had been called into [facility] DR ALBERTA BLACKMAN on file.

## 2018-09-13 ENCOUNTER — OFFICE VISIT (OUTPATIENT)
Dept: FAMILY MEDICINE CLINIC | Age: 83
End: 2018-09-13

## 2018-09-13 VITALS
HEART RATE: 77 BPM | WEIGHT: 151 LBS | OXYGEN SATURATION: 94 % | HEIGHT: 60 IN | RESPIRATION RATE: 16 BRPM | SYSTOLIC BLOOD PRESSURE: 121 MMHG | DIASTOLIC BLOOD PRESSURE: 71 MMHG | BODY MASS INDEX: 29.64 KG/M2 | TEMPERATURE: 98.3 F

## 2018-09-13 DIAGNOSIS — K21.9 GASTROESOPHAGEAL REFLUX DISEASE WITHOUT ESOPHAGITIS: ICD-10-CM

## 2018-09-13 DIAGNOSIS — I10 ESSENTIAL HYPERTENSION: ICD-10-CM

## 2018-09-13 DIAGNOSIS — E78.5 HYPERLIPIDEMIA, UNSPECIFIED HYPERLIPIDEMIA TYPE: ICD-10-CM

## 2018-09-13 DIAGNOSIS — M79.2 NEUROPATHIC PAIN, LEG, RIGHT: Primary | ICD-10-CM

## 2018-09-13 RX ORDER — PHENOL/SODIUM PHENOLATE
20 AEROSOL, SPRAY (ML) MUCOUS MEMBRANE DAILY
Qty: 90 TAB | Refills: 3 | Status: SHIPPED | OUTPATIENT
Start: 2018-09-13

## 2018-09-13 NOTE — MR AVS SNAPSHOT
[address] [place] [id number] 
[phone] Patient: Hayley Pickard MRN: [id number]:[id number] Visit Information Date & Time Provider Department Dept. Phone Encounter #  
 9/13/2018  1:45 PM Eddie Enriquez MD [address] [place] [address] [id number] Follow-up Instructions Return in about 3 months (around 12/13/2018). Upcoming Health Maintenance Date Due ZOSTER VACCINE AGE 60> 5/4/1991 GLAUCOMA SCREENING Q2Y 7/4/1996 Influenza Age 5 to Adult 8/1/2018 Pneumococcal 65+ Low/Medium Risk (2 of 2 - PPSV23) 10/25/2018 MEDICARE YEARLY EXAM 6/15/2019 DTaP/Tdap/Td series (2 - Td) 10/25/2027 Allergies as of 9/13/2018  Review Complete On: 9/13/2018 By: Eddie Enriquez MD  
  
 Severity Noted Reaction Type Reactions Egg Medium 05/08/2015   Intolerance Diarrhea, Nausea Only Other Plant, Animal, Environmental  03/14/2018    Runny Nose, Sneezing Current Immunizations  Reviewed on 10/20/2015 Name Date Influenza Vaccine (Madin [place] Canine Kidney) PF 9/20/2015, 8/14/2014 Influenza Vaccine (Quadrivalent) 10/19/2017 Tdap 10/25/2017 Not reviewed this visit You Were Diagnosed With   
  
 Codes Comments Neuropathic pain, leg, right    -  Primary ICD-10-CM: G57.91 
ICD-9-CM: 355.8 Essential hypertension     ICD-10-CM: I10 
ICD-9-CM: 401.9 Hyperlipidemia, unspecified hyperlipidemia type     ICD-10-CM: E78.5 ICD-9-CM: 272.4 Gastroesophageal reflux disease without esophagitis     ICD-10-CM: K21.9 ICD-9-CM: 530.81 Vitals BP Pulse Temp Resp Height(growth percentile) Weight(growth percentile) 121/71 77 98.3 °F (36.8 °C) (Oral) 16 5' (1.524 m) 151 lb (68.5 kg) SpO2 BMI OB Status Smoking Status 94% 29.49 kg/m2 Postmenopausal Former Smoker Vitals History BMI and BSA Data Body Mass Index Body Surface Area 29.49 kg/m 2 1.7 m 2 Preferred Pharmacy Pharmacy Name Phone [facility] [address] Your Updated Medication List  
  
   
This list is accurate as of 9/13/18  2:15 PM.  Always use your most recent med list.  
  
  
  
  
 ALEVE 220 mg tablet Generic drug:  naproxen sodium Take 220 mg by mouth daily. amLODIPine 5 mg tablet Commonly known as:  [place] Citron Take 1 Tab by mouth daily. atorvastatin 10 mg tablet Commonly known as:  LIPITOR Take 1 Tab by mouth daily. ketoconazole 2 % topical cream  
Commonly known as:  NIZORAL Apply  to affected area daily. lisinopril 20 mg tablet Commonly known as:  Coco Koehler Take 1 Tab by mouth daily. loratadine 10 mg tablet Commonly known as:  Shellye Drape Take 10 mg by mouth daily. Omeprazole delayed release 20 mg tablet Commonly known as:  PRILOSEC D/R Take 1 Tab by mouth daily. TART CHERRY PO Take  by mouth. Prescriptions Sent to Pharmacy Refills Omeprazole delayed release (PRILOSEC D/R) 20 mg tablet 3 Sig: Take 1 Tab by mouth daily. Class: Normal  
 Pharmacy: [facility] [address] [place] [address]. Ph #: [phone] Route: Oral  
  
We Performed the Following METABOLIC PANEL, COMPREHENSIVE [09277 CPT(R)] REFERRAL TO NEUROLOGY [VYP96 Custom] Comments:  
 Numbness and tingling in the right thigh, needs emg to determine if there is a pinched nerve or other Follow-up Instructions Return in about 3 months (around 12/13/2018). Referral Information Referral ID Referred By Referred To  
  
 [id number] [facility], [address] [place] [address] MD Colin Dias [address] [place], [address] [place] [address] Phone: [phone] Fax: [phone] Visits Status Start Date End Date 1 New Request 9/13/18 9/13/19 If your referral has a status of pending review or denied, additional information will be sent to support the outcome of this decision. Introducing [facility] & HEALTH SERVICES! [facility] introduces Loom patient portal. Now you can access parts of your medical record, email your doctor's office, and request medication refills online. 1. In your internet browser, go to [email or web address] 2. Click on the First Time User? Click Here link in the Sign In box. You will see the New Member Sign Up page. 3. Enter your Loom Access Code exactly as it appears below. You will not need to use this code after youve completed the sign-up process. If you do not sign up before the expiration date, you must request a new code. · Loom Access Code: [email or web address] Expires: 12/12/2018  2:15 PM 
 
4. Enter the last four digits of your Social Security Number (xxxx) and Date of Birth (mm/dd/yyyy) as indicated and click Submit. You will be taken to the next sign-up page. 5. Create a Loom ID. This will be your Loom login ID and cannot be changed, so think of one that is secure and easy to remember. 6. Create a Loom password. You can change your password at any time. 7. Enter your Password Reset Question and Answer. This can be used at a later time if you forget your password. 8. Enter your e-mail address. You will receive e-mail notification when new information is available in [address] 9. Click Sign Up. You can now view and download portions of your medical record. 10. Click the Download Summary menu link to download a portable copy of your medical information. If you have questions, please visit the Frequently Asked Questions section of the Loom website. Remember, Loom is NOT to be used for urgent needs. For medical emergencies, dial 911. Now available from your iPhone and Android! Please provide this summary of care documentation to your next provider. Your primary care clinician is listed as Layla Wolfe. If you have any questions after today's visit, please call [phone].

## 2018-09-13 NOTE — PROGRESS NOTES
Chief Complaint Patient presents with  Hypertension 3 mth f/u  Numbness  
  r. thigh  
 
she is a 80y.o. year old female who presents for evalution. She has numbness and tingling in the right thigh laterally. This started a few days ago This is a new problem She has been moving and carrying heavy things She does admit to lower back pain since she started this move. There is no other trauma Reviewed PmHx, RxHx, FmHx, SocHx, AllgHx and updated and dated in the chart. Aspirin yes ____   No____ N/A____ Patient Active Problem List  
 Diagnosis  Essential hypertension  Daytime somnolence  DDD (degenerative disc disease)  Hyperlipidemia Nurse notes were reviewed and copied and are correct Review of Systems - negative except as listed above in the HPI Objective:  
 
Vitals:  
 09/13/18 1353 BP: 121/71 Pulse: 77 Resp: 16 Temp: 98.3 °F (36.8 °C) TempSrc: Oral  
SpO2: 94% Weight: 151 lb (68.5 kg) Height: 5' (1.524 m) Physical Examination: General appearance - alert, well appearing, and in no distress and oriented to person, place, and time Mental status - alert, oriented to person, place, and time Chest - clear to auscultation, no wheezes, rales or rhonchi, symmetric air entry Heart - normal rate, regular rhythm, normal S1, S2, no murmurs, rubs, clicks or gallops Assessment/ Plan:  
Diagnoses and all orders for this visit: 1. Neuropathic pain, leg, right 
-     REFERRAL TO NEUROLOGY She might have some spinal stenosis. At 80 this is very likely Sh needs a NCS. Neurology will be able to do the study and see if MRI is indicated There is no weakness 2. Essential hypertension -     METABOLIC PANEL, COMPREHENSIVE Great contrl. No canges in meds 3. Hyperlipidemia, unspecified hyperlipidemia type -     METABOLIC PANEL, COMPREHENSIVE Taking lipitor Checking liver function 4. Gastroesophageal reflux disease without esophagitis -     Omeprazole delayed release (PRILOSEC D/R) 20 mg tablet; Take 1 Tab by mouth daily. Cont th [facility] Follow-up Disposition: 
Return in about 3 months (around 12/13/2018). ICD-10-CM ICD-9-CM 1. Neuropathic pain, leg, right G57.91 355.8 REFERRAL TO NEUROLOGY 2. Essential hypertension I65 998.7 METABOLIC PANEL, COMPREHENSIVE 3. Hyperlipidemia, unspecified hyperlipidemia type L04.3 064.7 METABOLIC PANEL, COMPREHENSIVE 4. Gastroesophageal reflux disease without esophagitis K21.9 530.81 Omeprazole delayed release (PRILOSEC D/R) 20 mg tablet I have discussed the diagnosis with the patient and the intended plan as seen in the above orders. The patient has received an after-visit summary and questions were answered concerning future plans. Medication Side Effects and Warnings were discussed with patient: yes Patient Labs were reviewed and or requested: yes Patient Past Records were reviewed and or requested: yes There are no Patient Instructions on file for this visit. The patient verbalizes understanding and agrees with the plan of care Patient has the advanced directives booklet to review

## 2018-09-13 NOTE — PROGRESS NOTES
1. Have you been to the ER, urgent care clinic since your last visit? Hospitalized since your last visit? No 
 
2. Have you seen or consulted any other health care providers outside of the [address] [place] [address] since your last visit? Include any pap smears or colon screening. No  
 
Chief Complaint Patient presents with  Hypertension 3 mth f/u  Numbness  
  r. thigh

## 2018-09-14 LAB
ALBUMIN SERPL-MCNC: 4 G/DL (ref 3.5–4.7)
ALBUMIN/GLOB SERPL: 1.6 {RATIO} (ref 1.2–2.2)
ALP SERPL-CCNC: 90 IU/L (ref 39–117)
ALT SERPL-CCNC: 19 IU/L (ref 0–32)
AST SERPL-CCNC: 18 IU/L (ref 0–40)
BILIRUB SERPL-MCNC: 0.2 MG/DL (ref 0–1.2)
BUN SERPL-MCNC: 12 MG/DL (ref 8–27)
BUN/CREAT SERPL: 14 (ref 12–28)
CALCIUM SERPL-MCNC: 9.2 MG/DL (ref 8.7–10.3)
CHLORIDE SERPL-SCNC: 102 MMOL/L (ref 96–106)
CO2 SERPL-SCNC: 20 MMOL/L (ref 20–29)
CREAT SERPL-MCNC: 0.86 MG/DL (ref 0.57–1)
GLOBULIN SER CALC-MCNC: 2.5 G/DL (ref 1.5–4.5)
GLUCOSE SERPL-MCNC: 96 MG/DL (ref 65–99)
POTASSIUM SERPL-SCNC: 4.7 MMOL/L (ref 3.5–5.2)
PROT SERPL-MCNC: 6.5 G/DL (ref 6–8.5)
SODIUM SERPL-SCNC: 138 MMOL/L (ref 134–144)

## 2018-10-10 ENCOUNTER — OFFICE VISIT (OUTPATIENT)
Dept: NEUROLOGY | Age: 83
End: 2018-10-10

## 2018-10-10 VITALS — SYSTOLIC BLOOD PRESSURE: 124 MMHG | HEART RATE: 82 BPM | OXYGEN SATURATION: 96 % | DIASTOLIC BLOOD PRESSURE: 60 MMHG

## 2018-10-10 DIAGNOSIS — G89.29 CHRONIC RIGHT-SIDED LOW BACK PAIN, WITH SCIATICA PRESENCE UNSPECIFIED: Primary | ICD-10-CM

## 2018-10-10 DIAGNOSIS — M54.5 CHRONIC RIGHT-SIDED LOW BACK PAIN, WITH SCIATICA PRESENCE UNSPECIFIED: Primary | ICD-10-CM

## 2018-10-10 NOTE — PATIENT INSTRUCTIONS
[facility]   Statement to Patients  April 1, 2014      In an effort to ensure the large volume of patient prescription refills is processed in the most efficient and expeditious manner, we are asking our patients to assist us by calling your Pharmacy for all prescription refills, this will include also your  Mail Order Pharmacy. The pharmacy will contact our office electronically to continue the refill process. Please do not wait until the last minute to call your pharmacy. We need at least 48 hours (2days) to fill prescriptions. We also encourage you to call your pharmacy before going to  your prescription to make sure it is ready. With regard to controlled substance prescription refill requests (narcotic refills) that need to be picked up at our office, we ask your cooperation by providing us with at least 72 hours (3days) notice that you will need a refill. We will not refill narcotic prescription refill requests after 4:00pm on any weekday, Monday through Thursday, or after 2:00pm on Fridays, or on the weekends. We encourage everyone to explore another way of getting your prescription refill request processed using [employer], our patient web portal through our electronic medical record system. [employer] is an efficient and effective way to communicate your medication request directly to the office and  downloadable as an janett on your smart phone . [employer] also features a review functionality that allows you to view your medication list as well as leave messages for your physician. Are you ready to get connected? If so please review the attatched instructions or speak to any of our staff to get you set up right away! Thank you so much for your cooperation. Should you have any questions please contact our Practice Administrator.     The Physicians and Staff,  [facility]        Learning About How to Have a Healthy Back  What causes back pain? Back pain is often caused by overuse, strain, or injury. For example, people often hurt their backs playing sports or working in the yard, being jolted in a car accident, or lifting something too heavy. Aging plays a part too. Your bones and muscles tend to lose strength as you age, which makes injury more likely. The spongy discs between the bones of the spine (vertebrae) may suffer from wear and tear and no longer provide enough cushion between the bones. A disc that bulges or breaks open (herniated disc) can press on nerves, causing back pain. In some people, back pain is the result of arthritis, broken vertebrae caused by bone loss (osteoporosis), illness, or a spine problem. Although most people have back pain at one time or another, there are steps you can take to make it less likely. How can you have a healthy back? Reduce stress on your back through good posture  Slumping or slouching alone may not cause low back pain. But after the back has been strained or injured, bad posture can make pain worse. · Sleep in a position that maintains your back's normal curves and on a mattress that feels comfortable. Sleep on your side with a pillow between your knees, or sleep on your back with a pillow under your knees. These positions can reduce strain on your back. · Stand and sit up straight. \"Good posture\" generally means your ears, shoulders, and hips are in a straight line. · If you must stand for a long time, put one foot on a stool, ledge, or box. Switch feet every now and then. · Sit in a chair that is low enough to let you place both feet flat on the floor with both knees nearly level with your hips. If your chair or desk is too high, use a footrest to raise your knees. Place a small pillow, a rolled-up towel, or a lumbar roll in the curve of your back if you need extra support. · Try a kneeling chair, which helps tilt your hips forward. This takes pressure off your lower back.   · Try sitting on an exercise ball. It can rock from side to side, which helps keep your back loose. · When driving, keep your knees nearly level with your hips. Sit straight, and drive with both hands on the steering wheel. Your arms should be in a slightly bent position. Reduce stress on your back through careful lifting  · Squat down, bending at the hips and knees only. If you need to, put one knee to the floor and extend your other knee in front of you, bent at a right angle (half kneeling). · Press your chest straight forward. This helps keep your upper back straight while keeping a slight arch in your low back. · Hold the load as close to your body as possible, at the level of your belly button (navel). · Use your feet to change direction, taking small steps. · Lead with your hips as you change direction. Keep your shoulders in line with your hips as you move. · Set down your load carefully, squatting with your knees and hips only. Exercise and stretch your back  · Do some exercise on most days of the week, if your doctor says it is okay. You can walk, run, swim, or cycle. · Stretch your back muscles. Here are a few exercises to try:  Palma Arn on your back, and gently pull one bent knee to your chest. Put that foot back on the floor, and then pull the other knee to your chest.  ¨ Do pelvic tilts. Lie on your back with your knees bent. Tighten your stomach muscles. Pull your belly button (navel) in and up toward your ribs. You should feel like your back is pressing to the floor and your hips and pelvis are slightly lifting off the floor. Hold for 6 seconds while breathing smoothly. ¨ Sit with your back flat against a wall. · Keep your core muscles strong. The muscles of your back, belly (abdomen), and buttocks support your spine. ¨ Pull in your belly and imagine pulling your navel toward your spine. Hold this for 6 seconds, then relax. Remember to keep breathing normally as you tense your muscles.   ¨ Do curl-ups. Always do them with your knees bent. Keep your low back on the floor, and curl your shoulders toward your knees using a smooth, slow motion. Keep your arms folded across your chest. If this bothers your neck, try putting your hands behind your neck (not your head), with your elbows spread apart. ¨ Lie on your back with your knees bent and your feet flat on the floor. Tighten your belly muscles, and then push with your feet and raise your buttocks up a few inches. Hold this position 6 seconds as you continue to breathe normally, then lower yourself slowly to the floor. Repeat 8 to 12 times. ¨ If you like group exercise, try Pilates or yoga. These classes have poses that strengthen the core muscles. Lead a healthy lifestyle  · Stay at a healthy weight to avoid strain on your back. · Do not smoke. Smoking increases the risk of osteoporosis, which weakens the spine. If you need help quitting, talk to your doctor about stop-smoking programs and medicines. These can increase your chances of quitting for good. Where can you learn more? Go to http://pierre-shannan.info/. Enter L315 in the search box to learn more about \"Learning About How to Have a Healthy Back. \"  Current as of: November 29, 2017  Content Version: 11.8  © 5357-4816 Healthwise, Incorporated. Care instructions adapted under license by [employer] (which disclaims liability or warranty for this information). If you have questions about a medical condition or this instruction, always ask your healthcare professional. [place] [address] any warranty or liability for your use of this information.

## 2018-10-10 NOTE — LETTER
10/10/2018 1:24 PM 
 
Patient:  Juan J Felton YOB: 1931 Date of Visit: 10/10/2018 Dear Nicholas White MD 
[address] [place] [address] VIA In Basket 
 : Thank you for referring Ms. Kevin Paige to me for evaluation/treatment. Below are the relevant portions of my assessment and plan of care. If you have questions, please do not hesitate to call me. I look forward to following Ms. Machado along with you. Sincerely, Az Sainz MD

## 2018-10-10 NOTE — MR AVS SNAPSHOT
[address] [id number] [place] [id number] 
[phone] Patient: Margie Elizabeth MRN: [id number] WXI:[id number] Visit Information Date & Time Provider Department Dept. Phone Encounter #  
 10/10/2018  1:00 PM Brittney [facility]ess[facility] [phone] [id number] Follow-up Instructions Return if symptoms worsen or fail to improve. Upcoming Health Maintenance Date Due Shingrix Vaccine Age 50> (1 of 2) 7/4/1981 GLAUCOMA SCREENING Q2Y 7/4/1996 Influenza Age 5 to Adult 8/1/2018 Pneumococcal 65+ Low/Medium Risk (2 of 2 - PPSV23) 10/25/2018 MEDICARE YEARLY EXAM 6/15/2019 DTaP/Tdap/Td series (2 - Td) 10/25/2027 Allergies as of 10/10/2018  Review Complete On: 10/10/2018 By: Brittney Ness MD  
  
 Severity Noted Reaction Type Reactions Egg Medium 05/08/2015   Intolerance Diarrhea, Nausea Only Other Plant, Animal, Environmental  03/14/2018    Runny Nose, Sneezing Current Immunizations  Reviewed on 10/20/2015 Name Date Influenza Vaccine (Madin Misti Canine Kidney) PF 9/20/2015, 8/14/2014 Influenza Vaccine (Quadrivalent) 10/19/2017 Tdap 10/25/2017 Not reviewed this visit You Were Diagnosed With   
  
 Codes Comments Chronic right-sided low back pain, with sciatica presence unspecified    -  Primary ICD-10-CM: M54.5, G89.29 ICD-9-CM: 724.2, 338.29 Vitals BP Pulse SpO2 OB Status Smoking Status 124/60 82 96% Postmenopausal Former Smoker Preferred Pharmacy Pharmacy Name Phone [facility] [address] Your Updated Medication List  
  
   
This list is accurate as of 10/10/18  1:18 PM.  Always use your most recent med list.  
  
  
  
  
 ALEVE 220 mg tablet Generic drug:  naproxen sodium Take 220 mg by mouth daily. amLODIPine 5 mg tablet Commonly known as:  Varsha Gutierrez Take 1 Tab by mouth daily. atorvastatin 10 mg tablet Commonly known as:  LIPITOR Take 1 Tab by mouth daily. ketoconazole 2 % topical cream  
Commonly known as:  NIZORAL Apply  to affected area daily. lisinopril 20 mg tablet Commonly known as:  Shivani Brill Take 1 Tab by mouth daily. loratadine 10 mg tablet Commonly known as:  Vianne Model Take 10 mg by mouth daily. Omeprazole delayed release 20 mg tablet Commonly known as:  PRILOSEC D/R Take 1 Tab by mouth daily. TART CHERRY PO Take  by mouth. Follow-up Instructions Return if symptoms worsen or fail to improve. Patient Instructions PRESCRIPTION REFILL POLICY [facility] Statement to Patients April 1, 2014 In an effort to ensure the large volume of patient prescription refills is processed in the most efficient and expeditious manner, we are asking our patients to assist us by calling your Pharmacy for all prescription refills, this will include also your  Mail Order Pharmacy. The pharmacy will contact our office electronically to continue the refill process. Please do not wait until the last minute to call your pharmacy. We need at least 48 hours (2days) to fill prescriptions. We also encourage you to call your pharmacy before going to  your prescription to make sure it is ready. With regard to controlled substance prescription refill requests (narcotic refills) that need to be picked up at our office, we ask your cooperation by providing us with at least 72 hours (3days) notice that you will need a refill. We will not refill narcotic prescription refill requests after 4:00pm on any weekday, Monday through Thursday, or after 2:00pm on Fridays, or on the weekends.   
  
We encourage everyone to explore another way of getting your prescription refill request processed using [employer], our patient web portal through our electronic medical record system. [employer] is an efficient and effective way to communicate your medication request directly to the office and  downloadable as an janett on your smart phone . [employer] also features a review functionality that allows you to view your medication list as well as leave messages for your physician. Are you ready to get connected? If so please review the attatched instructions or speak to any of our staff to get you set up right away! Thank you so much for your cooperation. Should you have any questions please contact our Practice Administrator. The Physicians and Staff,  [facility] Learning About How to Have a Healthy Back What causes back pain? Back pain is often caused by overuse, strain, or injury. For example, people often hurt their backs playing sports or working in the yard, being jolted in a car accident, or lifting something too heavy. Aging plays a part too. Your bones and muscles tend to lose strength as you age, which makes injury more likely. The spongy discs between the bones of the spine (vertebrae) may suffer from wear and tear and no longer provide enough cushion between the bones. A disc that bulges or breaks open (herniated disc) can press on nerves, causing back pain. In some people, back pain is the result of arthritis, broken vertebrae caused by bone loss (osteoporosis), illness, or a spine problem. Although most people have back pain at one time or another, there are steps you can take to make it less likely. How can you have a healthy back? Reduce stress on your back through good posture Slumping or slouching alone may not cause low back pain. But after the back has been strained or injured, bad posture can make pain worse.  
· Sleep in a position that maintains your back's normal curves and on a mattress that feels comfortable. Sleep on your side with a pillow between your knees, or sleep on your back with a pillow under your knees. These positions can reduce strain on your back. · Stand and sit up straight. \"Good posture\" generally means your ears, shoulders, and hips are in a straight line. · If you must stand for a long time, put one foot on a stool, ledge, or box. Switch feet every now and then. · Sit in a chair that is low enough to let you place both feet flat on the floor with both knees nearly level with your hips. If your chair or desk is too high, use a footrest to raise your knees. Place a small pillow, a rolled-up towel, or a lumbar roll in the curve of your back if you need extra support. · Try a kneeling chair, which helps tilt your hips forward. This takes pressure off your lower back. · Try sitting on an exercise ball. It can rock from side to side, which helps keep your back loose. · When driving, keep your knees nearly level with your hips. Sit straight, and drive with both hands on the steering wheel. Your arms should be in a slightly bent position. Reduce stress on your back through careful lifting · Squat down, bending at the hips and knees only. If you need to, put one knee to the floor and extend your other knee in front of you, bent at a right angle (half kneeling). · Press your chest straight forward. This helps keep your upper back straight while keeping a slight arch in your low back. · Hold the load as close to your body as possible, at the level of your belly button (navel). · Use your feet to change direction, taking small steps. · Lead with your hips as you change direction. Keep your shoulders in line with your hips as you move. · Set down your load carefully, squatting with your knees and hips only. Exercise and stretch your back · Do some exercise on most days of the week, if your doctor says it is okay. You can walk, run, swim, or cycle. · Stretch your back muscles. Here are a few exercises to try: ¨ Lie on your back, and gently pull one bent knee to your chest. Put that foot back on the floor, and then pull the other knee to your chest. 
¨ Do pelvic tilts. Lie on your back with your knees bent. Tighten your stomach muscles. Pull your belly button (navel) in and up toward your ribs. You should feel like your back is pressing to the floor and your hips and pelvis are slightly lifting off the floor. Hold for 6 seconds while breathing smoothly. ¨ Sit with your back flat against a wall. · Keep your core muscles strong. The muscles of your back, belly (abdomen), and buttocks support your spine. ¨ Pull in your belly and imagine pulling your navel toward your spine. Hold this for 6 seconds, then relax. Remember to keep breathing normally as you tense your muscles. ¨ Do curl-ups. Always do them with your knees bent. Keep your low back on the floor, and curl your shoulders toward your knees using a smooth, slow motion. Keep your arms folded across your chest. If this bothers your neck, try putting your hands behind your neck (not your head), with your elbows spread apart. ¨ Lie on your back with your knees bent and your feet flat on the floor. Tighten your belly muscles, and then push with your feet and raise your buttocks up a few inches. Hold this position 6 seconds as you continue to breathe normally, then lower yourself slowly to the floor. Repeat 8 to 12 times. ¨ If you like group exercise, try Pilates or yoga. These classes have poses that strengthen the core muscles. Lead a healthy lifestyle · Stay at a healthy weight to avoid strain on your back. · Do not smoke. Smoking increases the risk of osteoporosis, which weakens the spine. If you need help quitting, talk to your doctor about stop-smoking programs and medicines. These can increase your chances of quitting for good. Where can you learn more? Go to http://pierre-shannan.info/. Enter L315 in the search box to learn more about \"Learning About How to Have a Healthy Back. \" Current as of: November 29, 2017 Content Version: 11.8 © 3983-2121 Healthwise, Incorporated. Care instructions adapted under license by [employer] (which disclaims liability or warranty for this information). If you have questions about a medical condition or this instruction, always ask your healthcare professional. [address] any warranty or liability for your use of this information. Introducing [facility]! [employer] introduces [employer] patient portal. Now you can access parts of your medical record, email your doctor's office, and request medication refills online. 1. In your internet browser, go to [email or web address]. [email or web address]/"[email or web address] 2. Click on the First Time User? Click Here link in the Sign In box. You will see the New Member Sign Up page. 3. Enter your [employer] Access Code exactly as it appears below. You will not need to use this code after youve completed the sign-up process. If you do not sign up before the expiration date, you must request a new code. · [employer] Access Code: [email or web address] Expires: 12/12/2018  2:15 PM 
 
4. Enter the last four digits of your Social Security Number (xxxx) and Date of Birth (mm/dd/yyyy) as indicated and click Submit. You will be taken to the next sign-up page. 5. Create a Joystickerst ID. This will be your [employer] login ID and cannot be changed, so think of one that is secure and easy to remember. 6. Create a [employer] password. You can change your password at any time. 7. Enter your Password Reset Question and Answer. This can be used at a later time if you forget your password. 8. Enter your e-mail address. You will receive e-mail notification when new information is available in [address] 9. Click Sign Up. You can now view and download portions of your medical record. 10. Click the Download Summary menu link to download a portable copy of your medical information. If you have questions, please visit the Frequently Asked Questions section of the [employer] website. Remember, [employer] is NOT to be used for urgent needs. For medical emergencies, dial 911. Now available from your iPhone and Android! Please provide this summary of care documentation to your next provider. Your primary care clinician is listed as Daily Wilkerson. If you have any questions after today's visit, please call [phone].

## 2018-10-10 NOTE — PROGRESS NOTES
NEUROLOGY NEW PATIENT OFFICE CONSULTATION      10/10/2018    RE: Dao Canseco         7/4/1931      REFERRED BY:  Cornelia Nieto MD        CHIEF COMPLAINT:  This is Dao Canseco is a 80 y.o. female right handed who had concerns including Tingling and Numbness. HPI:     On Sept 7, 2018, patient was unpacking boxes after her new house. Afterwhich, patent noted lower back pain with numbness of the R anterolateral thigh area and R lateral leg. (-) weakness  Had some gait issues which is old.         ROS   (-) fever  (-) rash  All other systems reviewed and are negative    Past Medical Hx  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, episode of care unspecified     Allergic rhinitis, cause unspecified     Brachial neuritis or radiculitis NOS     Cervicalgia     Disorder of bone and cartilage, unspecified     Dizziness and giddiness     Esophageal reflux     Hemorrhoid     Hypercholesterolemia     Insomnia, unspecified     Osteoarthrosis, unspecified whether generalized or localized, unspecified site     Osteoporosis, unspecified     Other abnormal blood chemistry     Other and unspecified hyperlipidemia     Pain in joint, forearm     Pain in joint, pelvic region and thigh     Pain in joint, shoulder region     Unspecified essential hypertension     Unspecified transient cerebral ischemia     Unspecified vitamin D deficiency        Social Hx  Social History     Social History    Marital status:      Spouse name: N/A    Number of children: N/A    Years of education: N/A     Social History Main Topics    Smoking status: Former Smoker     Types: Cigarettes     Quit date: 3/3/1995    Smokeless tobacco: Never Used    Alcohol use 14.0 oz/week     28 Standard drinks or equivalent per week      Comment: beer and wine occ    Drug use: No    Sexual activity: No     Other Topics Concern    None     Social History Narrative Family Hx  Family History   Problem Relation Age of Onset    Cancer Other     Stroke Mother     Stroke Father     Heart Disease Sister     Heart Disease Brother     Stroke Brother        ALLERGIES  Allergies   Allergen Reactions    Egg Diarrhea and Nausea Only    Other Plant, Animal, Environmental Runny Nose and Sneezing       CURRENT MEDS  Current Outpatient Prescriptions   Medication Sig Dispense Refill    Omeprazole delayed release (PRILOSEC D/R) 20 mg tablet Take 1 Tab by mouth daily. 90 Tab 3    lisinopril (PRINIVIL, ZESTRIL) 20 mg tablet Take 1 Tab by mouth daily. 90 Tab 3    atorvastatin (LIPITOR) 10 mg tablet Take 1 Tab by mouth daily. 90 Tab 3    amLODIPine (NORVASC) 5 mg tablet Take 1 Tab by mouth daily. 30 Tab 11    VIT C/CHERRY & CELERY EX/GRP E (TART CHERRY PO) Take  by mouth.  loratadine (CLARITIN) 10 mg tablet Take 10 mg by mouth daily.  naproxen sodium (ALEVE) 220 mg tablet Take 220 mg by mouth daily.  ketoconazole (NIZORAL) 2 % topical cream Apply  to affected area daily. 15 g 0           PREVIOUS WORKUP: (reviewed)  IMAGING:    CT Results (recent):    Results from Hospital Encounter encounter on 09/23/17   CT HEAD WO CONT   Narrative EXAM:  CT HEAD WO CONT    INDICATION: Abnormal body paresthesias earlier today. Orthostatic dizziness. COMPARISON: None    TECHNIQUE: Noncontrast head CT. Coronal and sagittal reformats. CT dose  reduction was achieved through the use of a standardized protocol tailored for  this examination and automatic exposure control for dose modulation. FINDINGS: The ventricles and sulci are age-appropriate without hydrocephalus. There is no mass effect or midline shift. There is no intracranial hemorrhage or  extra-axial fluid collection. Subtle hypoattenuation in the cerebral  periventricular white matter. Small foci of hypoattenuation in the bilateral  putamen. The calvarium is intact.  The visualized paranasal sinuses and mastoid air cells  are clear. There are vascular calcifications. 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 No acute intracranial  hemorrhage or mass effect. MRI Results (recent):    Results from [place] encounter on 10/31/12   MRI CERV SPINE WO CONT   Narrative **Final Report**      ICD Codes / Adm. Diagnosis: 723.1   / Cervicalgia    Examination:  MRI C SPINE WO CON  - [id number] - Oct 31 2012  1:57PM  Accession No:  [id number]  Reason:  NECK PAIN , R/O HNP      REPORT:  Indication: Neck pain    Exam: MRI cervical spine. Comparisons: none    Sequences: Sagittal T1, T2, and STIR. Axial T1, gradient echo. No contrast.    FINDINGS: Alignment on the sagittal images is normal.  There are endplate   degenerative changes. The visualized posterior fossa and cord signal are   normal. Paraspinous soft tissues are within normal limits. C2-C3: There are bilateral uncovertebral degenerative changes slightly   narrowing the foramen    C3-C4: There are left-sided uncovertebral degenerative changes and facet   degenerative change. There are right-sided uncovertebral degenerative   changes. There is mild to moderate left foraminal narrowing    C4-C5: There is a slight disc osteophytic bulge with uncovertebral   degenerative change left greater than right and left facet arthropathy. There is borderline canal and mild left foraminal narrowing    C5-C6: There is a diffuse disc osteophytic bulge with bilateral   uncovertebral degenerative change. There is mild narrowing of the canal.    There is moderate right greater than left foraminal narrowing    C6-C7: There is a diffuse disc osteophytic bulge with bilateral   uncovertebral degenerative change. Canal stenosis is mild with moderate   bilateral foraminal narrowing    Examination of the upper thoracic spine demonstrates no significant stenosis.        IMPRESSION:  1. Multilevel degenerative change detailed by level above most significant   at C6-C7             Signing/Reading Doctor: Chanelle Kumar ([id number])    Approved: Chanelle Kumar ([id number])  10/31/2012                                      IR Results (recent):  No results found for this or any previous visit. VAS/US Results (recent):  No results found for this or any previous visit. LABS (reviewed)  Results for orders placed or performed in visit on 94/08/89   METABOLIC PANEL, COMPREHENSIVE   Result Value Ref Range    Glucose 96 65 - 99 mg/dL    BUN 12 8 - 27 mg/dL    Creatinine 0.86 0.57 - 1.00 mg/dL    GFR est non-AA 61 >59 mL/min/1.73    GFR est AA 70 >59 mL/min/1.73    BUN/Creatinine ratio 14 12 - 28    Sodium 138 134 - 144 mmol/L    Potassium 4.7 3.5 - 5.2 mmol/L    Chloride 102 96 - 106 mmol/L    CO2 20 20 - 29 mmol/L    Calcium 9.2 8.7 - 10.3 mg/dL    Protein, total 6.5 6.0 - 8.5 g/dL    Albumin 4.0 3.5 - 4.7 g/dL    GLOBULIN, TOTAL 2.5 1.5 - 4.5 g/dL    A-G Ratio 1.6 1.2 - 2.2    Bilirubin, total 0.2 0.0 - 1.2 mg/dL    Alk. phosphatase 90 39 - 117 IU/L    AST (SGOT) 18 0 - 40 IU/L    ALT (SGPT) 19 0 - 32 IU/L       Physical Exam:     Visit Vitals    /60    Pulse 82    SpO2 96%     General:  Alert, cooperative, no distress. Head:  Normocephalic, without obvious abnormality, atraumatic. Eyes:  Conjunctivae/corneas clear. Lungs:  Heart:   Non labored breathing  Regular rate and rhythm, no carotid bruits   Abdomen:   Soft, non-distended   Extremities: Extremities normal, atraumatic, no cyanosis or edema. Pulses: 2+ and symmetric all extremities. Skin: Skin color, texture, turgor normal. No rashes or lesions.   Neurologic Exam     Gen: Attention normal             Language: naming, repetition, fluency normal             Memory: intact recent and remote memory  Cranial Nerves:  I: smell Not tested   II: visual fields Full to confrontation   II: pupils Equal, round, reactive to light   II: optic disc No papilledema   III,VII: ptosis none III,IV,VI: extraocular muscles  Full ROM   V: mastication normal   V: facial light touch sensation  normal   VII: facial muscle function   symmetric   VIII: hearing symmetric   IX: soft palate elevation  normal   XI: trapezius strength  5/5   XI: sternocleidomastoid strength 5/5   XI: neck flexion strength  5/5   XII: tongue  midline     Motor: normal bulk and tone, no tremor              Strength: 5/5 all four extremities  Sensory: intact to LT, PP, vibration, and JPS  Reflexes: 2+ UE, 1+ L  Knee, absnet R knee, Absent ana ankles; Down going toes  Coordination: Good FTN and HTS  Gait: arthralgic and slightly drags R leg           Impression:     Carlos Alvarez is a 80 y.o. female who  has a past medical history of Acute myocardial infarction of other inferior wall, episode of care unspecified; Allergic rhinitis, cause unspecified; Brachial neuritis or radiculitis NOS; Cervicalgia; Disorder of bone and cartilage, unspecified; Dizziness and giddiness; Esophageal reflux; Hemorrhoid; Hypercholesterolemia; Insomnia, unspecified; Osteoarthrosis, unspecified whether generalized or localized, unspecified site; Osteoporosis, unspecified; Other abnormal blood chemistry; Other and unspecified hyperlipidemia; Pain in joint, forearm; Pain in joint, pelvic region and thigh; Pain in joint, shoulder region; Unspecified essential hypertension; Unspecified transient cerebral ischemia; and Unspecified vitamin D deficiency. who On Sept 7, 2018, noted acute onset of severe bilateral lower back pain and numbness of the R anterolateral thigh area and R lateral leg after unpacking boxes when she moved to her new house. Consideration includes a R lumbar radiculopathy aggravated by her unpacking. RECOMMENDATIONS  1. I had a long discussion with patient. Discussed diagnosis, prognosis, pathophysiology and available treatment. Reviewed test results. All questions were answered.   2. Because of her age and since symptoms are improving, will not order any imaging for now  3. Discusses option of going to physical therapy, but patient declined  4. Advise to avoid any heavy lifting  5. Given information about low back care    I spent total of 60 mins with the patient, greater than 50% of the visit was spent on providing counseling and coordination of care. Follow-up Disposition:  Return if symptoms worsen or fail to improve.       Thank you for the consultation      Jacqueline Conde MD  Diplomate, American Board of Psychiatry and Neurology  Diplomate, Neuromuscular Medicine  Diplomate, American Board of Electrodiagnostic Medicine        CC: Arelis Kathleen MD  Fax: [phone]

## 2018-12-17 ENCOUNTER — OFFICE VISIT (OUTPATIENT)
Dept: FAMILY MEDICINE CLINIC | Age: 83
End: 2018-12-17

## 2018-12-17 VITALS
HEIGHT: 60 IN | WEIGHT: 150 LBS | HEART RATE: 80 BPM | BODY MASS INDEX: 29.45 KG/M2 | RESPIRATION RATE: 17 BRPM | DIASTOLIC BLOOD PRESSURE: 80 MMHG | SYSTOLIC BLOOD PRESSURE: 133 MMHG | OXYGEN SATURATION: 92 % | TEMPERATURE: 97.5 F

## 2018-12-17 DIAGNOSIS — E78.2 MIXED HYPERLIPIDEMIA: ICD-10-CM

## 2018-12-17 DIAGNOSIS — I10 ESSENTIAL HYPERTENSION: Primary | ICD-10-CM

## 2018-12-17 RX ORDER — ATORVASTATIN CALCIUM 10 MG/1
10 TABLET, FILM COATED ORAL DAILY
Qty: 90 TAB | Refills: 3 | Status: SHIPPED | OUTPATIENT
Start: 2018-12-17

## 2018-12-17 NOTE — PROGRESS NOTES
Chief Complaint   Patient presents with    Hypertension     3 mth f/u     she is a 80y.o. year old female who presents for evalution. bp well controlled  No chest pain or SOB, doing well  Walking with cane        Reviewed PmHx, RxHx, FmHx, SocHx, AllgHx and updated and dated in the chart. Aspirin yes ____   No____ N/A____    Patient Active Problem List    Diagnosis    Essential hypertension    Daytime somnolence    DDD (degenerative disc disease)    Hyperlipidemia       Nurse notes were reviewed and copied and are correct  Review of Systems - negative except as listed above in the HPI    Objective:     Vitals:    12/17/18 1400   BP: 133/80   Pulse: 80   Resp: 17   Temp: 97.5 °F (36.4 °C)   TempSrc: Oral   SpO2: 92%   Weight: 150 lb (68 kg)   Height: 5' (1.524 m)       Physical Examination: General appearance - alert, well appearing, and in no distress  Mental status - alert, oriented to person, place, and time  Mouth - mucous membranes moist, pharynx normal without lesions  Neck - supple, no significant adenopathy  Lymphatics - no palpable lymphadenopathy, no hepatosplenomegaly  Chest - clear to auscultation, no wheezes, rales or rhonchi, symmetric air entry  Heart - normal rate, regular rhythm, normal S1, S2, no murmurs, rubs, clicks or gallops  Abdomen - soft, nontender, nondistended, no masses or organomegaly  Neurological - alert, oriented, normal speech, no focal findings or movement disorder noted  Musculoskeletal - no joint tenderness, deformity or swelling  Extremities - peripheral pulses normal, 1+ pedal edema, no clubbing or cyanosis  Skin - normal coloration and turgor, no rashes, no suspicious skin lesions noted      Assessment/ Plan:   Diagnoses and all orders for this visit:    1. Essential hypertension  -     METABOLIC PANEL, COMPREHENSIVE    2. Mixed hyperlipidemia  -     atorvastatin (LIPITOR) 10 mg tablet; Take 1 Tab by mouth daily.        Follow-up Disposition:  Return in about 3 months (around 3/17/2019). ICD-10-CM ICD-9-CM    1. Essential hypertension G15 763.2 METABOLIC PANEL, COMPREHENSIVE   2. Mixed hyperlipidemia E78.2 272.2 atorvastatin (LIPITOR) 10 mg tablet       I have discussed the diagnosis with the patient and the intended plan as seen in the above orders. The patient has received an after-visit summary and questions were answered concerning future plans. Medication Side Effects and Warnings were discussed with patient: yes  Patient Labs were reviewed and or requested: yes  Patient Past Records were reviewed and or requested: yes        There are no Patient Instructions on file for this visit.     The patient verbalizes understanding and agrees with the plan of care        Patient has the advanced directives booklet to review

## 2018-12-17 NOTE — PROGRESS NOTES
1. Have you been to the ER, urgent care clinic since your last visit? Hospitalized since your last visit? No    2. Have you seen or consulted any other health care providers outside of the [address] [place] [address] since your last visit? Include any pap smears or colon screening.  No     Chief Complaint   Patient presents with    Hypertension     3 mth f/u

## 2018-12-18 LAB
ALBUMIN SERPL-MCNC: 4.2 G/DL (ref 3.5–4.7)
ALBUMIN/GLOB SERPL: 1.8 {RATIO} (ref 1.2–2.2)
ALP SERPL-CCNC: 100 IU/L (ref 39–117)
ALT SERPL-CCNC: 13 IU/L (ref 0–32)
AST SERPL-CCNC: 16 IU/L (ref 0–40)
BILIRUB SERPL-MCNC: 0.2 MG/DL (ref 0–1.2)
BUN SERPL-MCNC: 14 MG/DL (ref 8–27)
BUN/CREAT SERPL: 15 (ref 12–28)
CALCIUM SERPL-MCNC: 9.2 MG/DL (ref 8.7–10.3)
CHLORIDE SERPL-SCNC: 106 MMOL/L (ref 96–106)
CO2 SERPL-SCNC: 23 MMOL/L (ref 20–29)
CREAT SERPL-MCNC: 0.94 MG/DL (ref 0.57–1)
GLOBULIN SER CALC-MCNC: 2.3 G/DL (ref 1.5–4.5)
GLUCOSE SERPL-MCNC: 98 MG/DL (ref 65–99)
INTERPRETATION: NORMAL
POTASSIUM SERPL-SCNC: 4.5 MMOL/L (ref 3.5–5.2)
PROT SERPL-MCNC: 6.5 G/DL (ref 6–8.5)
SODIUM SERPL-SCNC: 142 MMOL/L (ref 134–144)

## 2019-02-14 DIAGNOSIS — I10 ESSENTIAL HYPERTENSION: ICD-10-CM

## 2019-02-14 NOTE — TELEPHONE ENCOUNTER
----- Message from Roxane Knox sent at 2/14/2019 10:45 AM EST -----  Regarding:  Dr. Feroz Moreira Refill  [facility] Pt requesting a refill on \"Amlodipine besylate 5mg\" to be sent to [facility] at [phone] on file. Pt has only 3 days medication remaining and would like a 30-day supply. Pt would like to pick it up tomorrow because pt has transportation. Pt also mentioned something about [facility] being her prescription medication provider so she can get a 90-day supply at next refill. Best contact is [phone].

## 2019-02-15 DIAGNOSIS — I10 ESSENTIAL HYPERTENSION: ICD-10-CM

## 2019-02-15 RX ORDER — AMLODIPINE BESYLATE 5 MG/1
5 TABLET ORAL DAILY
Qty: 30 TAB | Refills: 0 | Status: SHIPPED | OUTPATIENT
Start: 2019-02-15 | End: 2019-02-15 | Stop reason: SDUPTHER

## 2019-02-15 RX ORDER — AMLODIPINE BESYLATE 5 MG/1
5 TABLET ORAL DAILY
Qty: 90 TAB | Refills: 3 | Status: SHIPPED | OUTPATIENT
Start: 2019-02-15

## 2019-03-18 ENCOUNTER — OFFICE VISIT (OUTPATIENT)
Dept: FAMILY MEDICINE CLINIC | Age: 84
End: 2019-03-18

## 2019-03-18 VITALS
WEIGHT: 142 LBS | SYSTOLIC BLOOD PRESSURE: 130 MMHG | TEMPERATURE: 97.7 F | HEART RATE: 80 BPM | OXYGEN SATURATION: 96 % | DIASTOLIC BLOOD PRESSURE: 77 MMHG | BODY MASS INDEX: 27.88 KG/M2 | RESPIRATION RATE: 17 BRPM | HEIGHT: 60 IN

## 2019-03-18 DIAGNOSIS — M79.2 NEUROPATHIC PAIN, LEG, RIGHT: ICD-10-CM

## 2019-03-18 DIAGNOSIS — R21 RASH: ICD-10-CM

## 2019-03-18 DIAGNOSIS — I10 ESSENTIAL HYPERTENSION: Primary | ICD-10-CM

## 2019-03-18 DIAGNOSIS — M25.552 PAIN OF LEFT HIP JOINT: ICD-10-CM

## 2019-03-18 DIAGNOSIS — E78.2 MIXED HYPERLIPIDEMIA: ICD-10-CM

## 2019-03-18 RX ORDER — DICLOFENAC SODIUM 75 MG/1
75 TABLET, DELAYED RELEASE ORAL 2 TIMES DAILY
Qty: 60 TAB | Refills: 5 | Status: SHIPPED | OUTPATIENT
Start: 2019-03-18 | End: 2019-04-24

## 2019-03-18 NOTE — PROGRESS NOTES
1. Have you been to the ER, urgent care clinic since your last visit? Hospitalized since your last visit? No    2. Have you seen or consulted any other health care providers outside of the [address] [place] [address] since your last visit? Include any pap smears or colon screening.  No     Chief Complaint   Patient presents with    Form Completion    Shoulder Pain     r. shoulder     Skin Problem     l. anterior shin

## 2019-03-18 NOTE — PROGRESS NOTES
Chief Complaint   Patient presents with    Form Completion    Shoulder Pain     r. shoulder     Skin Problem     l. anterior shin     she is a 80y.o. year old female who presents for evalution. She has a form from the [place] for me to fill out  She also has a spot on the left ankle that has been there for years . it is scaly sometimes and today it felt sore when she bathed. She is concerned about skin ca  Reviewed PmHx, RxHx, FmHx, SocHx, AllgHx and updated and dated in the chart. Aspirin yes ____   No____ N/A____    Patient Active Problem List    Diagnosis    Essential hypertension    Daytime somnolence    DDD (degenerative disc disease)    Hyperlipidemia       Nurse notes were reviewed and copied and are correct  Review of Systems - negative except as listed above in the HPI    Objective:     Vitals:    03/18/19 1402   BP: 130/77   Pulse: 80   Resp: 17   Temp: 97.7 °F (36.5 °C)   TempSrc: Oral   SpO2: 96%   Weight: 142 lb (64.4 kg)   Height: 5' (1.524 m)     Physical Examination: General appearance - alert, well appearing, and in no distress  Mental status - alert, oriented to person, place, and time  Neck - supple, no significant adenopathy  Lymphatics - no palpable lymphadenopathy, no hepatosplenomegaly  Chest - clear to auscultation, no wheezes, rales or rhonchi, symmetric air entry  Heart - normal rate, regular rhythm, normal S1, S2, no murmurs, rubs, clicks or gallops       Assessment/ Plan:   Diagnoses and all orders for this visit:    1. Essential hypertension  -     METABOLIC PANEL, COMPREHENSIVE    2. Mixed hyperlipidemia    3. Neuropathic pain, leg, right      5. Pain of left hip joint  -     diclofenac EC (VOLTAREN) 75 mg EC tablet; Take 1 Tab by mouth two (2) times a day. 6. Rash  -     REFERRAL TO DERMATOLOGY     could possibly be an AK  Follow-up Disposition:  Return in about 3 months (around 6/18/2019). ICD-10-CM ICD-9-CM    1.  Essential hypertension K94 611.8 METABOLIC PANEL, COMPREHENSIVE   2. Mixed hyperlipidemia E78.2 272.2    3. Neuropathic pain, leg, right G57.91 355.8    4. Hyperlipidemia, unspecified hyperlipidemia type E78.5 272.4    5. Pain of left hip joint M25.552 719.45 diclofenac EC (VOLTAREN) 75 mg EC tablet   6. Rash R21 782.1 REFERRAL TO DERMATOLOGY       I have discussed the diagnosis with the patient and the intended plan as seen in the above orders. The patient has received an after-visit summary and questions were answered concerning future plans. Medication Side Effects and Warnings were discussed with patient: yes  Patient Labs were reviewed and or requested: yes  Patient Past Records were reviewed and or requested: yes        There are no Patient Instructions on file for this visit.     The patient verbalizes understanding and agrees with the plan of care        Patient has the advanced directives booklet to review

## 2019-03-19 LAB
ALBUMIN SERPL-MCNC: 4.5 G/DL (ref 3.5–4.7)
ALBUMIN/GLOB SERPL: 1.9 {RATIO} (ref 1.2–2.2)
ALP SERPL-CCNC: 98 IU/L (ref 39–117)
ALT SERPL-CCNC: 13 IU/L (ref 0–32)
AST SERPL-CCNC: 15 IU/L (ref 0–40)
BILIRUB SERPL-MCNC: <0.2 MG/DL (ref 0–1.2)
BUN SERPL-MCNC: 20 MG/DL (ref 8–27)
BUN/CREAT SERPL: 24 (ref 12–28)
CALCIUM SERPL-MCNC: 9.7 MG/DL (ref 8.7–10.3)
CHLORIDE SERPL-SCNC: 105 MMOL/L (ref 96–106)
CO2 SERPL-SCNC: 20 MMOL/L (ref 20–29)
CREAT SERPL-MCNC: 0.83 MG/DL (ref 0.57–1)
GLOBULIN SER CALC-MCNC: 2.4 G/DL (ref 1.5–4.5)
GLUCOSE SERPL-MCNC: 96 MG/DL (ref 65–99)
POTASSIUM SERPL-SCNC: 4.7 MMOL/L (ref 3.5–5.2)
PROT SERPL-MCNC: 6.9 G/DL (ref 6–8.5)
SODIUM SERPL-SCNC: 143 MMOL/L (ref 134–144)

## 2019-04-15 ENCOUNTER — TELEPHONE (OUTPATIENT)
Dept: FAMILY MEDICINE CLINIC | Age: 84
End: 2019-04-15

## 2019-04-15 NOTE — TELEPHONE ENCOUNTER
Patient states the Rx recently give for her pain gave her severe diarrhea and abdominal cramps and she stopped it and went back to [facility]

## 2019-04-20 ENCOUNTER — HOSPITAL ENCOUNTER (EMERGENCY)
Age: 84
Discharge: HOME | End: 2019-04-20
Attending: EMERGENCY MEDICINE
Payer: MEDICARE

## 2019-04-20 ENCOUNTER — APPOINTMENT (OUTPATIENT)
Dept: GENERAL RADIOLOGY | Age: 84
End: 2019-04-20
Attending: EMERGENCY MEDICINE
Payer: MEDICARE

## 2019-04-20 VITALS
HEART RATE: 93 BPM | OXYGEN SATURATION: 97 % | RESPIRATION RATE: 18 BRPM | SYSTOLIC BLOOD PRESSURE: 142 MMHG | TEMPERATURE: 97.9 F | DIASTOLIC BLOOD PRESSURE: 77 MMHG

## 2019-04-20 DIAGNOSIS — S42.412A LEFT SUPRACONDYLAR HUMERUS FRACTURE, CLOSED, INITIAL ENCOUNTER: Primary | ICD-10-CM

## 2019-04-20 PROCEDURE — 75810000053 HC SPLINT APPLICATION

## 2019-04-20 PROCEDURE — 99283 EMERGENCY DEPT VISIT LOW MDM: CPT

## 2019-04-20 PROCEDURE — 74011250637 HC RX REV CODE- 250/637: Performed by: EMERGENCY MEDICINE

## 2019-04-20 PROCEDURE — A4565 SLINGS: HCPCS

## 2019-04-20 PROCEDURE — 73080 X-RAY EXAM OF ELBOW: CPT

## 2019-04-20 RX ORDER — HYDROCODONE BITARTRATE AND ACETAMINOPHEN 5; 325 MG/1; MG/1
1 TABLET ORAL
Status: COMPLETED | OUTPATIENT
Start: 2019-04-20 | End: 2019-04-20

## 2019-04-20 RX ORDER — HYDROCODONE BITARTRATE AND ACETAMINOPHEN 5; 325 MG/1; MG/1
1 TABLET ORAL
Qty: 12 TAB | Refills: 0 | Status: SHIPPED | OUTPATIENT
Start: 2019-04-20 | End: 2019-04-24

## 2019-04-20 RX ORDER — CHOLECALCIFEROL (VITAMIN D3) 125 MCG
440 CAPSULE ORAL 2 TIMES DAILY
COMMUNITY
End: 2019-04-28

## 2019-04-20 RX ADMIN — HYDROCODONE BITARTRATE AND ACETAMINOPHEN 1 TABLET: 5; 325 TABLET ORAL at 17:26

## 2019-04-20 NOTE — ED PROVIDER NOTES
80 y.o. female with past medical history significant for hemorrhoid, hypercholesterolemia, cervicalgia, osteoarthritis, esophageal reflux, acute myocardial infarction of other inferior wall, osteoporosis, vitamin D deficiency, and hyperlipidemia who presents from private vehicle with chief complaint of elbow pain. Pt reports she tripped over a doormat and fell a few hours ago. Pt states she landed on left elbow. Pt notes she took 2 aleve for arthritis earlier this morning. Pt denies taking any blood thinner. Pt denies LOC. Pt denies hitting her head. There are no other acute medical concerns at this time. PCP: Javier Wilhelm MD 
 
Note written by Jamie Alfaro, as dictated by Susan Rivera MD 5:04 PM 
 
 
 
 
The history is provided by the patient and a relative. No  was used. Past Medical History:  
Diagnosis Date  Acute myocardial infarction of other inferior wall, episode of care unspecified  Allergic rhinitis, cause unspecified  Brachial neuritis or radiculitis NOS  Cervicalgia  Disorder of bone and cartilage, unspecified  Dizziness and giddiness  Esophageal reflux  Hemorrhoid  Hypercholesterolemia  Insomnia, unspecified  Osteoarthrosis, unspecified whether generalized or localized, unspecified site  Osteoporosis, unspecified  Other abnormal blood chemistry  Other and unspecified hyperlipidemia  Pain in joint, forearm  Pain in joint, pelvic region and thigh  Pain in joint, shoulder region  Unspecified essential hypertension  Unspecified transient cerebral ischemia  Unspecified vitamin D deficiency Past Surgical History:  
Procedure Laterality Date  HX BUNIONECTOMY  HX CATARACT REMOVAL Bilateral   
 HX CHOLECYSTECTOMY  HX PARTIAL HYSTERECTOMY  HX TUBAL LIGATION Family History:  
Problem Relation Age of Onset  Cancer Other  Stroke Mother  Stroke Father  Heart Disease Sister  Heart Disease Brother  Stroke Brother Social History Socioeconomic History  Marital status:  Spouse name: Not on file  Number of children: Not on file  Years of education: Not on file  Highest education level: Not on file Occupational History  Not on file Social Needs  Financial resource strain: Not on file  Food insecurity:  
  Worry: Not on file Inability: Not on file  Transportation needs:  
  Medical: Not on file Non-medical: Not on file Tobacco Use  Smoking status: Former Smoker Types: Cigarettes Last attempt to quit: 3/3/1995 Years since quittin.1  Smokeless tobacco: Never Used Substance and Sexual Activity  Alcohol use: Yes Alcohol/week: 14.0 oz Types: 28 Standard drinks or equivalent per week Comment: beer and wine occ  Drug use: No  
 Sexual activity: Never Lifestyle  Physical activity:  
  Days per week: Not on file Minutes per session: Not on file  Stress: Not on file Relationships  Social connections:  
  Talks on phone: Not on file Gets together: Not on file Attends Tenriism service: Not on file Active member of club or organization: Not on file Attends meetings of clubs or organizations: Not on file Relationship status: Not on file  Intimate partner violence:  
  Fear of current or ex partner: Not on file Emotionally abused: Not on file Physically abused: Not on file Forced sexual activity: Not on file Other Topics Concern  Not on file Social History Narrative  Not on file ALLERGIES: Egg and Other plant, animal, environmental 
 
Review of Systems Constitutional: Negative for fever. HENT: Negative for facial swelling. Eyes: Negative for visual disturbance. Respiratory: Negative for chest tightness. Cardiovascular: Negative for chest pain. Gastrointestinal: Negative for abdominal pain. Genitourinary: Negative for difficulty urinating and dysuria. Musculoskeletal: Positive for arthralgias. Skin: Negative for rash. Neurological: Negative for dizziness and syncope. Hematological: Negative for adenopathy. Psychiatric/Behavioral: Negative for suicidal ideas. There were no vitals filed for this visit. Physical Exam  
Constitutional: She is oriented to person, place, and time. She appears well-developed and well-nourished. No distress. HENT:  
Head: Normocephalic and atraumatic. Mouth/Throat: Oropharynx is clear and moist.  
Eyes: Pupils are equal, round, and reactive to light. No scleral icterus. Neck: Normal range of motion. Neck supple. No thyromegaly present. Cardiovascular: Normal rate, regular rhythm, normal heart sounds and intact distal pulses. No murmur heard. Pulmonary/Chest: Effort normal and breath sounds normal. No respiratory distress. Abdominal: Soft. Bowel sounds are normal. She exhibits no distension. There is no tenderness. Musculoskeletal: She exhibits no edema. Left elbow: She exhibits decreased range of motion and swelling. Tenderness found. Left arm swelling around the elbow and tenderness to palpation. ROM limited due to pain. Neurological: She is alert and oriented to person, place, and time. Skin: Skin is warm and dry. No rash noted. She is not diaphoretic. Nursing note and vitals reviewed. Note written by Jamie Hurtado, as dictated by Inez Herbert MD 5:04 PM 
 
MDM Number of Diagnoses or Management Options Left supracondylar humerus fracture, closed, initial encounter:  
 
  
Discussed with Dr. Esther Clarke via  Chery Desir. Plan for posterior long arm splint. F/u with ortho. Splint applied by *PCT** and evaluated by Adin Jeffrey MD.  Neurovascular status intact post splint application. Desired position maintained. Procedures

## 2019-04-20 NOTE — DISCHARGE INSTRUCTIONS
Patient Education        Broken Arm: Care Instructions  Your Care Instructions  Fractures can range from a small, hairline crack, to a bone or bones broken into two or more pieces. Your treatment depends on how bad the break is. Your doctor may have put your arm in a splint or cast to allow it to heal or to keep it stable until you see another doctor. It may take weeks or months for your arm to heal. You can help your arm heal with some care at home. You heal best when you take good care of yourself. Eat a variety of healthy foods, and don't smoke. You may have had a sedative to help you relax. You may be unsteady after having sedation. It can take a few hours for the medicine's effects to wear off. Common side effects of sedation include nausea, vomiting, and feeling sleepy or tired. The doctor has checked you carefully, but problems can develop later. If you notice any problems or new symptoms, get medical treatment right away. Follow-up care is a key part of your treatment and safety. Be sure to make and go to all appointments, and call your doctor if you are having problems. It's also a good idea to know your test results and keep a list of the medicines you take. How can you care for yourself at home? · If the doctor gave you a sedative:  ? For 24 hours, don't do anything that requires attention to detail. It takes time for the medicine's effects to completely wear off.  ? For your safety, do not drive or operate any machinery that could be dangerous. Wait until the medicine wears off and you can think clearly and react easily. · Put ice or a cold pack on your arm for 10 to 20 minutes at a time. Try to do this every 1 to 2 hours for the next 3 days (when you are awake). Put a thin cloth between the ice and your cast or splint. Keep the cast or splint dry. · Follow the cast care instructions your doctor gives you. If you have a splint, do not take it off unless your doctor tells you to.   · Be safe with medicines. Take pain medicines exactly as directed. ? If the doctor gave you a prescription medicine for pain, take it as prescribed. ? If you are not taking a prescription pain medicine, ask your doctor if you can take an over-the-counter medicine. · Prop up your arm on pillows when you sit or lie down in the first few days after the injury. Keep the arm higher than the level of your heart. This will help reduce swelling. · Follow instructions for exercises to keep your arm strong. · Wiggle your fingers and wrist often to reduce swelling and stiffness. When should you call for help? Call 911 anytime you think you may need emergency care. For example, call if:    · You are very sleepy and you have trouble waking up.    Call your doctor now or seek immediate medical care if:    · You have new or worse nausea or vomiting.     · You have new or worse pain.     · Your hand or fingers are cool or pale or change color.     · Your cast or splint feels too tight.     · You have tingling, weakness, or numbness in your hand or fingers.    Watch closely for changes in your health, and be sure to contact your doctor if:    · You do not get better as expected.     · You have problems with your cast or splint. Where can you learn more? Go to http://pierre-shannan.info/. Enter K006 in the search box to learn more about \"Broken Arm: Care Instructions. \"  Current as of: September 20, 2018  Content Version: 11.9  © 1839-0825 [employer], [employer]. Care instructions adapted under license by [employer] (which disclaims liability or warranty for this information). If you have questions about a medical condition or this instruction, always ask your healthcare professional. [place] [address] any warranty or liability for your use of this information.

## 2019-04-24 ENCOUNTER — HOSPITAL ENCOUNTER (OUTPATIENT)
Dept: PREADMISSION TESTING | Age: 84
Discharge: HOME | DRG: 494 | End: 2019-04-24
Payer: MEDICARE

## 2019-04-24 ENCOUNTER — ANESTHESIA EVENT (OUTPATIENT)
Dept: SURGERY | Age: 84
DRG: 494 | End: 2019-04-24
Payer: MEDICARE

## 2019-04-24 VITALS
HEIGHT: 60 IN | SYSTOLIC BLOOD PRESSURE: 107 MMHG | WEIGHT: 142.25 LBS | OXYGEN SATURATION: 96 % | HEART RATE: 88 BPM | RESPIRATION RATE: 18 BRPM | DIASTOLIC BLOOD PRESSURE: 57 MMHG | BODY MASS INDEX: 27.93 KG/M2 | TEMPERATURE: 98.2 F

## 2019-04-24 LAB
ANION GAP SERPL CALC-SCNC: 5 MMOL/L (ref 5–15)
BUN SERPL-MCNC: 18 MG/DL (ref 6–20)
BUN/CREAT SERPL: 21 (ref 12–20)
CALCIUM SERPL-MCNC: 9.3 MG/DL (ref 8.5–10.1)
CHLORIDE SERPL-SCNC: 104 MMOL/L (ref 97–108)
CO2 SERPL-SCNC: 27 MMOL/L (ref 21–32)
CREAT SERPL-MCNC: 0.87 MG/DL (ref 0.55–1.02)
GLUCOSE SERPL-MCNC: 94 MG/DL (ref 65–100)
POTASSIUM SERPL-SCNC: 4.9 MMOL/L (ref 3.5–5.1)
SODIUM SERPL-SCNC: 136 MMOL/L (ref 136–145)

## 2019-04-24 PROCEDURE — 36415 COLL VENOUS BLD VENIPUNCTURE: CPT

## 2019-04-24 PROCEDURE — 80048 BASIC METABOLIC PNL TOTAL CA: CPT

## 2019-04-24 PROCEDURE — 93005 ELECTROCARDIOGRAM TRACING: CPT

## 2019-04-24 NOTE — PERIOP NOTES
[address]                  
[address] [facility], [address] [facility] MAIN OR                                  [phone] MAIN PRE OP                          [phone]                                                                                AMBULATORY PRE OP          [phone] PRE-ADMISSION TESTING    21  Surgery Date:   Thursday 4/25/19 Is surgery arrival time given by surgeon? NO If [facility] staff will call you Wednesday 4/24/19 between 3 and 7pm the day before your surgery with your arrival time. (If your surgery is on a Monday, we will call you the Friday before.) Call [phone] after 7pm Monday-Friday if you did not receive your arrival time. INSTRUCTIONS BEFORE YOUR SURGERY When You 
Arrive Arrive at the 2nd [address] [facility] on the day of your surgery Have your insurance card, photo ID, and any copayment (if needed) Food 
 and  
Drink NO food or drink after midnight the night before surgery This means NO water, gum, mints, coffee, juice, etc. 
No alcohol (beer, wine, liquor) 24 hours before and after surgery Medications to TAKE Morning of Surgery MEDICATIONS TO TAKE THE MORNING OF SURGERY WITH A SIP OF WATER:  
? Amlodipine, loratidine,omeprazole, atorvastatin Medications To 
STOP      7 days before surgery ? Non-Steroidal anti-inflammatory Drugs (NSAID's): for example, Ibuprofen (Advil, Motrin), Naproxen (Aleve) ? Aspirin, if taking for pain ? Herbal supplements, vitamins, and fish oil Blood Thinners ? If you take  Aspirin, Plavix, Coumadin, or any blood-thinning or anti-blood clot medicine, talk to the doctor who prescribed the medications for pre-operative instructions. Bathing Clothing Jewelry Valuables ?   If you shower the morning of surgery, please do not apply anything to your skin (lotions, powders, deodorant, or makeup, especially mascara) ? Do not shave or trim anywhere 24 hours before surgery ? Wear your hair loose or down; no pony-tails, buns, or metal hair clips ? Wear loose, comfortable, clean clothes ? Wear glasses instead of contacts ? Leave money, valuables, and jewelry, including body piercings, at home Going Home       or Spending the Night ? SAME-DAY SURGERY: You must have a responsible adult drive you home and stay with you 24 hours after surgery ? ADMITS: If your doctor is keeping you into the hospital after surgery, leave personal belongings/luggage in your car until you have a hospital room number. Hospital discharge time is 12 noon Drivers must be here before 12 noon unless you are told differently Special Instructions Free  parking 7a-5p. Bring completed medication list on day of surgery Follow all instructions so your surgery wont be cancelled. Please, be on time. If a situation occurs and you are delayed the day of surgery, call [phone] or          [phone]. If your physical condition changes (like a fever, cold, flu, etc.) call your surgeon. The patient was contacted  in person. Home medication reviewed and verified during PAT appointment. The patient verbalizes understanding of all instructions and does not  need reinforcement.

## 2019-04-25 ENCOUNTER — HOSPITAL ENCOUNTER (INPATIENT)
Age: 84
LOS: 3 days | Discharge: NURSING, REHAB OR LONG-TERM CARE | DRG: 494 | End: 2019-04-28
Attending: ORTHOPAEDIC SURGERY | Admitting: ORTHOPAEDIC SURGERY
Payer: MEDICARE

## 2019-04-25 ENCOUNTER — ANESTHESIA (OUTPATIENT)
Dept: SURGERY | Age: 84
DRG: 494 | End: 2019-04-25
Payer: MEDICARE

## 2019-04-25 ENCOUNTER — APPOINTMENT (OUTPATIENT)
Dept: GENERAL RADIOLOGY | Age: 84
DRG: 494 | End: 2019-04-25
Attending: ORTHOPAEDIC SURGERY
Payer: MEDICARE

## 2019-04-25 DIAGNOSIS — S42.491A OTHER CLOSED DISPLACED FRACTURE OF DISTAL END OF RIGHT HUMERUS, INITIAL ENCOUNTER: Primary | ICD-10-CM

## 2019-04-25 PROBLEM — S42.409A HUMERUS DISTAL FRACTURE: Status: ACTIVE | Noted: 2019-04-25

## 2019-04-25 LAB
ATRIAL RATE: 90 BPM
CALCULATED P AXIS, ECG09: 44 DEGREES
CALCULATED R AXIS, ECG10: -52 DEGREES
CALCULATED T AXIS, ECG11: 38 DEGREES
DIAGNOSIS, 93000: NORMAL
P-R INTERVAL, ECG05: 166 MS
Q-T INTERVAL, ECG07: 330 MS
QRS DURATION, ECG06: 74 MS
QTC CALCULATION (BEZET), ECG08: 403 MS
VENTRICULAR RATE, ECG03: 90 BPM

## 2019-04-25 PROCEDURE — 77030006773 HC BLD SAW OSC BRSM -A: Performed by: ORTHOPAEDIC SURGERY

## 2019-04-25 PROCEDURE — 77030000031 HC BIT DRL QC SYNT -C: Performed by: ORTHOPAEDIC SURGERY

## 2019-04-25 PROCEDURE — 74011250636 HC RX REV CODE- 250/636

## 2019-04-25 PROCEDURE — C1769 GUIDE WIRE: HCPCS | Performed by: ORTHOPAEDIC SURGERY

## 2019-04-25 PROCEDURE — C1713 ANCHOR/SCREW BN/BN,TIS/BN: HCPCS | Performed by: ORTHOPAEDIC SURGERY

## 2019-04-25 PROCEDURE — 77030032490 HC SLV COMPR SCD KNE COVD -B

## 2019-04-25 PROCEDURE — 77030020782 HC GWN BAIR PAWS FLX 3M -B

## 2019-04-25 PROCEDURE — 74011000250 HC RX REV CODE- 250

## 2019-04-25 PROCEDURE — 77030016458 HC BIT DRL CANN1 SYNT -F: Performed by: ORTHOPAEDIC SURGERY

## 2019-04-25 PROCEDURE — 77030016474 HC BIT DRL QC3 SYNT -C: Performed by: ORTHOPAEDIC SURGERY

## 2019-04-25 PROCEDURE — 77030011640 HC PAD GRND REM COVD -A: Performed by: ORTHOPAEDIC SURGERY

## 2019-04-25 PROCEDURE — 77030008467 HC STPLR SKN COVD -B: Performed by: ORTHOPAEDIC SURGERY

## 2019-04-25 PROCEDURE — 76210000001 HC OR PH I REC 2.5 TO 3 HR: Performed by: ORTHOPAEDIC SURGERY

## 2019-04-25 PROCEDURE — P9045 ALBUMIN (HUMAN), 5%, 250 ML: HCPCS | Performed by: ANESTHESIOLOGY

## 2019-04-25 PROCEDURE — 77030020061 HC IV BLD WRMR ADMIN SET 3M -B: Performed by: ANESTHESIOLOGY

## 2019-04-25 PROCEDURE — 74011250637 HC RX REV CODE- 250/637: Performed by: PHYSICIAN ASSISTANT

## 2019-04-25 PROCEDURE — 77030003601 HC NDL NRV BLK BBMI -A

## 2019-04-25 PROCEDURE — 77030040356 HC CORD BPLR FRCP COVD -A: Performed by: ORTHOPAEDIC SURGERY

## 2019-04-25 PROCEDURE — 76010000132 HC OR TIME 2.5 TO 3 HR: Performed by: ORTHOPAEDIC SURGERY

## 2019-04-25 PROCEDURE — 74011250636 HC RX REV CODE- 250/636: Performed by: ANESTHESIOLOGY

## 2019-04-25 PROCEDURE — 65270000029 HC RM PRIVATE

## 2019-04-25 PROCEDURE — 0PSG04Z REPOSITION LEFT HUMERAL SHAFT WITH INTERNAL FIXATION DEVICE, OPEN APPROACH: ICD-10-PCS | Performed by: ORTHOPAEDIC SURGERY

## 2019-04-25 PROCEDURE — 64415 NJX AA&/STRD BRCH PLXS IMG: CPT

## 2019-04-25 PROCEDURE — 77030015464 HC BIT DRL QC SYNT -E: Performed by: ORTHOPAEDIC SURGERY

## 2019-04-25 PROCEDURE — 74011250637 HC RX REV CODE- 250/637: Performed by: ORTHOPAEDIC SURGERY

## 2019-04-25 PROCEDURE — 76060000036 HC ANESTHESIA 2.5 TO 3 HR: Performed by: ORTHOPAEDIC SURGERY

## 2019-04-25 PROCEDURE — 77030026438 HC STYL ET INTUB CARD -A: Performed by: ANESTHESIOLOGY

## 2019-04-25 PROCEDURE — 77030003862 HC BIT DRL SYNT -B: Performed by: ORTHOPAEDIC SURGERY

## 2019-04-25 PROCEDURE — 77030003029 HC SUT VCRL J&J -B: Performed by: ORTHOPAEDIC SURGERY

## 2019-04-25 PROCEDURE — 77030018836 HC SOL IRR NACL ICUM -A: Performed by: ORTHOPAEDIC SURGERY

## 2019-04-25 PROCEDURE — 77030000032 HC CUF TRNQT ZIMM -B: Performed by: ORTHOPAEDIC SURGERY

## 2019-04-25 PROCEDURE — 77030032490 HC SLV COMPR SCD KNE COVD -B: Performed by: ORTHOPAEDIC SURGERY

## 2019-04-25 PROCEDURE — 77030034850: Performed by: ORTHOPAEDIC SURGERY

## 2019-04-25 PROCEDURE — 77030008684 HC TU ET CUF COVD -B: Performed by: ANESTHESIOLOGY

## 2019-04-25 PROCEDURE — 77030028907 HC WRP KNEE WO BGS SOLM -B

## 2019-04-25 PROCEDURE — 77030013079 HC BLNKT BAIR HGGR 3M -A: Performed by: ANESTHESIOLOGY

## 2019-04-25 PROCEDURE — 77030031139 HC SUT VCRL2 J&J -A: Performed by: ORTHOPAEDIC SURGERY

## 2019-04-25 PROCEDURE — 77030019908 HC STETH ESOPH SIMS -A: Performed by: ANESTHESIOLOGY

## 2019-04-25 PROCEDURE — 74011250636 HC RX REV CODE- 250/636: Performed by: ORTHOPAEDIC SURGERY

## 2019-04-25 PROCEDURE — 77030013708 HC HNDPC SUC IRR PULS STRY –B: Performed by: ORTHOPAEDIC SURGERY

## 2019-04-25 DEVICE — SCREW BNE L22MM DIA3.5MM CORT S STL ST NONCANNULATED LOK: Type: IMPLANTABLE DEVICE | Site: ARM | Status: FUNCTIONAL

## 2019-04-25 DEVICE — SCREW BNE L100MM DIA6.5MM THRD L32MM S STL CANN HEX SOCK: Type: IMPLANTABLE DEVICE | Site: ARM | Status: FUNCTIONAL

## 2019-04-25 DEVICE — WASHER ORTH DIA13MM FOR CANN SCR: Type: IMPLANTABLE DEVICE | Site: ARM | Status: FUNCTIONAL

## 2019-04-25 DEVICE — SCREW BNE L22MM DIA2.7MM ANK S STL ST VAR ANG LOK FULL THRD: Type: IMPLANTABLE DEVICE | Site: ARM | Status: FUNCTIONAL

## 2019-04-25 DEVICE — SCREW BNE L12MM DIA2.7MM ANK S STL ST VAR ANG LOK FULL THRD: Type: IMPLANTABLE DEVICE | Site: ARM | Status: FUNCTIONAL

## 2019-04-25 DEVICE — 2.7MM VA LCKNG SCREW SLF-TPNG WITH T8 STARDRIVE RECESS 18MM: Type: IMPLANTABLE DEVICE | Site: ARM | Status: FUNCTIONAL

## 2019-04-25 DEVICE — 3.5MM LOCKING SCREW SLF-TPNG W/STARDRIVE(TM) RECESS 16MM: Type: IMPLANTABLE DEVICE | Site: ARM | Status: FUNCTIONAL

## 2019-04-25 DEVICE — SCREW BNE L32MM DIA2.7MM S STL ST VAR ANG LOK FULL THRD T8: Type: IMPLANTABLE DEVICE | Site: ARM | Status: FUNCTIONAL

## 2019-04-25 DEVICE — SCREW BNE L44MM DIA2.7MM ANK S STL ST VAR ANG LOK FULL THRD: Type: IMPLANTABLE DEVICE | Site: ARM | Status: FUNCTIONAL

## 2019-04-25 RX ORDER — ATORVASTATIN CALCIUM 10 MG/1
10 TABLET, FILM COATED ORAL DAILY
Status: DISCONTINUED | OUTPATIENT
Start: 2019-04-26 | End: 2019-04-28 | Stop reason: HOSPADM

## 2019-04-25 RX ORDER — TRAMADOL HYDROCHLORIDE 50 MG/1
50 TABLET ORAL
Status: DISCONTINUED | OUTPATIENT
Start: 2019-04-25 | End: 2019-04-28 | Stop reason: HOSPADM

## 2019-04-25 RX ORDER — FLUMAZENIL 0.1 MG/ML
0.2 INJECTION INTRAVENOUS
Status: DISCONTINUED | OUTPATIENT
Start: 2019-04-25 | End: 2019-04-25 | Stop reason: HOSPADM

## 2019-04-25 RX ORDER — ROPIVACAINE HYDROCHLORIDE 5 MG/ML
INJECTION, SOLUTION EPIDURAL; INFILTRATION; PERINEURAL AS NEEDED
Status: DISCONTINUED | OUTPATIENT
Start: 2019-04-25 | End: 2019-04-25 | Stop reason: HOSPADM

## 2019-04-25 RX ORDER — LIDOCAINE HYDROCHLORIDE 20 MG/ML
INJECTION, SOLUTION EPIDURAL; INFILTRATION; INTRACAUDAL; PERINEURAL AS NEEDED
Status: DISCONTINUED | OUTPATIENT
Start: 2019-04-25 | End: 2019-04-25 | Stop reason: HOSPADM

## 2019-04-25 RX ORDER — CEFAZOLIN SODIUM/WATER 2 G/20 ML
2 SYRINGE (ML) INTRAVENOUS EVERY 8 HOURS
Status: DISCONTINUED | OUTPATIENT
Start: 2019-04-25 | End: 2019-04-25 | Stop reason: SDUPTHER

## 2019-04-25 RX ORDER — PROPOFOL 10 MG/ML
INJECTION, EMULSION INTRAVENOUS AS NEEDED
Status: DISCONTINUED | OUTPATIENT
Start: 2019-04-25 | End: 2019-04-25 | Stop reason: HOSPADM

## 2019-04-25 RX ORDER — OXYCODONE HYDROCHLORIDE 5 MG/1
2.5 TABLET ORAL
Status: DISCONTINUED | OUTPATIENT
Start: 2019-04-25 | End: 2019-04-25

## 2019-04-25 RX ORDER — AMLODIPINE BESYLATE 5 MG/1
5 TABLET ORAL DAILY
Status: DISCONTINUED | OUTPATIENT
Start: 2019-04-26 | End: 2019-04-28 | Stop reason: HOSPADM

## 2019-04-25 RX ORDER — CEFAZOLIN SODIUM 1 G/3ML
2 INJECTION, POWDER, FOR SOLUTION INTRAMUSCULAR; INTRAVENOUS ONCE
Status: DISCONTINUED | OUTPATIENT
Start: 2019-04-25 | End: 2019-04-25

## 2019-04-25 RX ORDER — FENTANYL CITRATE 50 UG/ML
INJECTION, SOLUTION INTRAMUSCULAR; INTRAVENOUS AS NEEDED
Status: DISCONTINUED | OUTPATIENT
Start: 2019-04-25 | End: 2019-04-25 | Stop reason: HOSPADM

## 2019-04-25 RX ORDER — SODIUM CHLORIDE 0.9 % (FLUSH) 0.9 %
5-40 SYRINGE (ML) INJECTION EVERY 8 HOURS
Status: DISCONTINUED | OUTPATIENT
Start: 2019-04-25 | End: 2019-04-28 | Stop reason: HOSPADM

## 2019-04-25 RX ORDER — DEXAMETHASONE SODIUM PHOSPHATE 4 MG/ML
INJECTION, SOLUTION INTRA-ARTICULAR; INTRALESIONAL; INTRAMUSCULAR; INTRAVENOUS; SOFT TISSUE AS NEEDED
Status: DISCONTINUED | OUTPATIENT
Start: 2019-04-25 | End: 2019-04-25 | Stop reason: HOSPADM

## 2019-04-25 RX ORDER — TRAMADOL HYDROCHLORIDE 50 MG/1
100 TABLET ORAL
Status: DISCONTINUED | OUTPATIENT
Start: 2019-04-25 | End: 2019-04-28 | Stop reason: HOSPADM

## 2019-04-25 RX ORDER — LORATADINE 10 MG/1
10 TABLET ORAL
Status: DISCONTINUED | OUTPATIENT
Start: 2019-04-25 | End: 2019-04-28 | Stop reason: HOSPADM

## 2019-04-25 RX ORDER — ONDANSETRON 8 MG/1
8 TABLET, ORALLY DISINTEGRATING ORAL
Qty: 10 TAB | Refills: 2 | Status: SHIPPED | OUTPATIENT
Start: 2019-04-25

## 2019-04-25 RX ORDER — ONDANSETRON 2 MG/ML
4 INJECTION INTRAMUSCULAR; INTRAVENOUS
Status: ACTIVE | OUTPATIENT
Start: 2019-04-25 | End: 2019-04-26

## 2019-04-25 RX ORDER — LIDOCAINE HYDROCHLORIDE 10 MG/ML
0.1 INJECTION, SOLUTION EPIDURAL; INFILTRATION; INTRACAUDAL; PERINEURAL AS NEEDED
Status: DISCONTINUED | OUTPATIENT
Start: 2019-04-25 | End: 2019-04-25 | Stop reason: HOSPADM

## 2019-04-25 RX ORDER — CEFAZOLIN SODIUM/WATER 2 G/20 ML
2 SYRINGE (ML) INTRAVENOUS ONCE
Status: COMPLETED | OUTPATIENT
Start: 2019-04-25 | End: 2019-04-25

## 2019-04-25 RX ORDER — ALBUMIN HUMAN 50 G/1000ML
12.5 SOLUTION INTRAVENOUS ONCE
Status: COMPLETED | OUTPATIENT
Start: 2019-04-25 | End: 2019-04-25

## 2019-04-25 RX ORDER — HYDROMORPHONE HYDROCHLORIDE 2 MG/ML
0.5 INJECTION, SOLUTION INTRAMUSCULAR; INTRAVENOUS; SUBCUTANEOUS
Status: ACTIVE | OUTPATIENT
Start: 2019-04-25 | End: 2019-04-26

## 2019-04-25 RX ORDER — LISINOPRIL 20 MG/1
20 TABLET ORAL DAILY
Status: DISCONTINUED | OUTPATIENT
Start: 2019-04-26 | End: 2019-04-28 | Stop reason: HOSPADM

## 2019-04-25 RX ORDER — NALOXONE HYDROCHLORIDE 0.4 MG/ML
0.2 INJECTION, SOLUTION INTRAMUSCULAR; INTRAVENOUS; SUBCUTANEOUS
Status: DISCONTINUED | OUTPATIENT
Start: 2019-04-25 | End: 2019-04-25 | Stop reason: HOSPADM

## 2019-04-25 RX ORDER — OXYCODONE AND ACETAMINOPHEN 5; 325 MG/1; MG/1
1 TABLET ORAL
Qty: 40 TAB | Refills: 0 | Status: SHIPPED | OUTPATIENT
Start: 2019-04-25 | End: 2019-04-30

## 2019-04-25 RX ORDER — SODIUM CHLORIDE, SODIUM LACTATE, POTASSIUM CHLORIDE, CALCIUM CHLORIDE 600; 310; 30; 20 MG/100ML; MG/100ML; MG/100ML; MG/100ML
125 INJECTION, SOLUTION INTRAVENOUS CONTINUOUS
Status: DISCONTINUED | OUTPATIENT
Start: 2019-04-25 | End: 2019-04-25 | Stop reason: HOSPADM

## 2019-04-25 RX ORDER — ACETAMINOPHEN 500 MG
1000 TABLET ORAL EVERY 8 HOURS
Status: DISCONTINUED | OUTPATIENT
Start: 2019-04-25 | End: 2019-04-28 | Stop reason: HOSPADM

## 2019-04-25 RX ORDER — PANTOPRAZOLE SODIUM 40 MG/1
40 TABLET, DELAYED RELEASE ORAL DAILY PRN
Status: DISCONTINUED | OUTPATIENT
Start: 2019-04-25 | End: 2019-04-28 | Stop reason: HOSPADM

## 2019-04-25 RX ORDER — HYDROMORPHONE HYDROCHLORIDE 1 MG/ML
.25-1 INJECTION, SOLUTION INTRAMUSCULAR; INTRAVENOUS; SUBCUTANEOUS
Status: DISCONTINUED | OUTPATIENT
Start: 2019-04-25 | End: 2019-04-25 | Stop reason: HOSPADM

## 2019-04-25 RX ORDER — SODIUM CHLORIDE 0.9 % (FLUSH) 0.9 %
5-40 SYRINGE (ML) INJECTION AS NEEDED
Status: DISCONTINUED | OUTPATIENT
Start: 2019-04-25 | End: 2019-04-28 | Stop reason: HOSPADM

## 2019-04-25 RX ORDER — MIDAZOLAM HYDROCHLORIDE 1 MG/ML
INJECTION, SOLUTION INTRAMUSCULAR; INTRAVENOUS AS NEEDED
Status: DISCONTINUED | OUTPATIENT
Start: 2019-04-25 | End: 2019-04-25 | Stop reason: HOSPADM

## 2019-04-25 RX ORDER — CEFAZOLIN SODIUM/WATER 2 G/20 ML
2 SYRINGE (ML) INTRAVENOUS EVERY 8 HOURS
Status: COMPLETED | OUTPATIENT
Start: 2019-04-25 | End: 2019-04-26

## 2019-04-25 RX ORDER — DIPHENHYDRAMINE HYDROCHLORIDE 50 MG/ML
12.5 INJECTION, SOLUTION INTRAMUSCULAR; INTRAVENOUS AS NEEDED
Status: DISCONTINUED | OUTPATIENT
Start: 2019-04-25 | End: 2019-04-25 | Stop reason: HOSPADM

## 2019-04-25 RX ORDER — MIDAZOLAM HYDROCHLORIDE 1 MG/ML
2 INJECTION, SOLUTION INTRAMUSCULAR; INTRAVENOUS
Status: DISCONTINUED | OUTPATIENT
Start: 2019-04-25 | End: 2019-04-25 | Stop reason: HOSPADM

## 2019-04-25 RX ORDER — EPHEDRINE SULFATE 50 MG/ML
INJECTION, SOLUTION INTRAVENOUS AS NEEDED
Status: DISCONTINUED | OUTPATIENT
Start: 2019-04-25 | End: 2019-04-25 | Stop reason: HOSPADM

## 2019-04-25 RX ORDER — OXYCODONE HYDROCHLORIDE 5 MG/1
5 TABLET ORAL
Status: DISCONTINUED | OUTPATIENT
Start: 2019-04-25 | End: 2019-04-25

## 2019-04-25 RX ORDER — AMOXICILLIN 250 MG
1 CAPSULE ORAL 2 TIMES DAILY
Status: DISCONTINUED | OUTPATIENT
Start: 2019-04-25 | End: 2019-04-28 | Stop reason: HOSPADM

## 2019-04-25 RX ORDER — PROPOFOL 10 MG/ML
INJECTION, EMULSION INTRAVENOUS
Status: DISCONTINUED | OUTPATIENT
Start: 2019-04-25 | End: 2019-04-25 | Stop reason: HOSPADM

## 2019-04-25 RX ADMIN — ACETAMINOPHEN 1000 MG: 500 TABLET ORAL at 14:47

## 2019-04-25 RX ADMIN — MIDAZOLAM HYDROCHLORIDE 2 MG: 1 INJECTION, SOLUTION INTRAMUSCULAR; INTRAVENOUS at 07:40

## 2019-04-25 RX ADMIN — TRAMADOL HYDROCHLORIDE 50 MG: 50 TABLET, FILM COATED ORAL at 14:47

## 2019-04-25 RX ADMIN — FENTANYL CITRATE 50 MCG: 50 INJECTION, SOLUTION INTRAMUSCULAR; INTRAVENOUS at 07:40

## 2019-04-25 RX ADMIN — ROPIVACAINE HYDROCHLORIDE 30 ML: 5 INJECTION, SOLUTION EPIDURAL; INFILTRATION; PERINEURAL at 07:47

## 2019-04-25 RX ADMIN — SODIUM CHLORIDE, SODIUM LACTATE, POTASSIUM CHLORIDE, AND CALCIUM CHLORIDE: 600; 310; 30; 20 INJECTION, SOLUTION INTRAVENOUS at 09:22

## 2019-04-25 RX ADMIN — DEXAMETHASONE SODIUM PHOSPHATE 8 MG: 4 INJECTION, SOLUTION INTRA-ARTICULAR; INTRALESIONAL; INTRAMUSCULAR; INTRAVENOUS; SOFT TISSUE at 09:10

## 2019-04-25 RX ADMIN — SENNOSIDES, DOCUSATE SODIUM 1 TABLET: 50; 8.6 TABLET, FILM COATED ORAL at 18:29

## 2019-04-25 RX ADMIN — Medication 2 G: at 08:37

## 2019-04-25 RX ADMIN — SODIUM CHLORIDE, SODIUM LACTATE, POTASSIUM CHLORIDE, AND CALCIUM CHLORIDE 125 ML/HR: 600; 310; 30; 20 INJECTION, SOLUTION INTRAVENOUS at 07:03

## 2019-04-25 RX ADMIN — ACETAMINOPHEN 1000 MG: 500 TABLET ORAL at 21:09

## 2019-04-25 RX ADMIN — TRAMADOL HYDROCHLORIDE 100 MG: 50 TABLET, FILM COATED ORAL at 21:09

## 2019-04-25 RX ADMIN — EPHEDRINE SULFATE 10 MG: 50 INJECTION, SOLUTION INTRAVENOUS at 07:54

## 2019-04-25 RX ADMIN — PROPOFOL 100 MG: 10 INJECTION, EMULSION INTRAVENOUS at 08:20

## 2019-04-25 RX ADMIN — Medication 10 ML: at 18:29

## 2019-04-25 RX ADMIN — Medication 2 G: at 18:29

## 2019-04-25 RX ADMIN — LIDOCAINE HYDROCHLORIDE 80 MG: 20 INJECTION, SOLUTION EPIDURAL; INFILTRATION; INTRACAUDAL; PERINEURAL at 08:19

## 2019-04-25 RX ADMIN — FENTANYL CITRATE 50 MCG: 50 INJECTION, SOLUTION INTRAMUSCULAR; INTRAVENOUS at 07:43

## 2019-04-25 RX ADMIN — PROPOFOL 100 MCG/KG/MIN: 10 INJECTION, EMULSION INTRAVENOUS at 08:42

## 2019-04-25 RX ADMIN — FENTANYL CITRATE 50 MCG: 50 INJECTION, SOLUTION INTRAMUSCULAR; INTRAVENOUS at 08:20

## 2019-04-25 RX ADMIN — ALBUMIN (HUMAN) 12.5 G: 12.5 INJECTION, SOLUTION INTRAVENOUS at 12:08

## 2019-04-25 NOTE — BRIEF OP NOTE
BRIEF OPERATIVE NOTE Date of Procedure: 4/25/2019 Preoperative Diagnosis: LEFT DISTAL HUMERUS FRACTURE Postoperative Diagnosis: LEFT DISTAL HUMERUS FRACTURE Procedure(s): OPEN REDUCTION INTERNAL FIXATION DISTAL HUMERUS Surgeon(s) and Role: * Radha Bosch MD - Primary Surgical Assistant: Samantha Douglas Surgical Staff: 
Circ-1: Sanam Butts RN 
Circ-2: Renan Lopes RN 
Circ-Relief: Nina Jones RN Scrub Tech-1: [facility] Scrub Tech-Relief: Bina Eis Surg Asst-1: Enrico Griffith Event Time In Time Out Incision Start 4272 Incision Close 1040 Anesthesia: Regional  
Estimated Blood Loss: 150cc Specimens: * No specimens in log * Findings: above Complications: none Implants:  
Implant Name Type Inv. Item Serial No.  Lot No. LRB No. Used Action  
medial plate   NA SYNTHES [place] NA Left 1 Implanted SCR VA LCK STRDRV T8 2.7X44MM -- VA LCP FOREFOOT MIDFOOT SYS - SNA  SCR VA LCK STRDRV T8 2.7X44MM -- VA LCP FOREFOOT MIDFOOT SYS NA SYNTHES [place] NA Left 1 Implanted SCR VA LCK STRDRV T8 2.7X32MM -- VA LCP FOREFOOT MIDFOOT SYS - SNA  SCR VA LCK STRDRV T8 2.7X32MM -- VA LCP FOREFOOT MIDFOOT SYS NA SYNTHES [place] NA Left 1 Implanted SCR VA LCK STRDRV T8 2.7X18MM -- VA LCP FOREFOOT MIDFOOT SYS - SNA  SCR VA LCK STRDRV T8 2.7X18MM -- VA LCP FOREFOOT MIDFOOT SYS NA SYNTHES [place] NA Left 1 Implanted SCR VA LCK STRDRV T8 2.7X12MM -- VA LCP FOREFOOT MIDFOOT SYS - SNA  SCR VA LCK STRDRV T8 2.7X12MM -- VA LCP FOREFOOT MIDFOOT SYS NA SYNTHES [place] NA Left 1 Implanted SCR VA LCK STRDRV T8 2.7X22MM -- VA LCP FOREFOOT MIDFOOT SYS - SNA  SCR VA LCK STRDRV T8 2.7X22MM -- VA LCP FOREFOOT MIDFOOT SYS NA SYNTHES [place] NA Left 1 Implanted SCR BNE LCK ST T25 3.5X16MM SS --  - SNA  SCR BNE LCK ST T25 3.5X16MM SS --  NA SYNTHES [place] NA Left 1 Implanted WASHER MINI SCR 13. 0MM SS --  - SNA  WASHER MINI SCR 13. 0MM SS --  NA SYNTHES [place] NA Left 1 Implanted SCR BNE ARAVIND 32 THRD 6.5X100 -- SS - SNA  SCR BNE ARAVIND 32 THRD 6.5X100 -- SS NA SYNTHES [place] NA Left 1 Implanted SCR BNE CRTX ST HEX 3.5X22MM -- SS - SNA  SCR BNE CRTX ST HEX 3.5X22MM -- SS NA SYNTHES [place] NA Left 1 Implanted 3.0 headless compression screw   NA SYNTHES [place] NA Left 2 Implanted 38 mm 3.0 heaDLESS COMPR SCREW   NA SYNTHES [place] NA Left 1 Implanted

## 2019-04-25 NOTE — ANESTHESIA POSTPROCEDURE EVALUATION
Procedure(s): OPEN REDUCTION INTERNAL FIXATION DISTAL HUMERUS. 
 
regional 
 
Anesthesia Post Evaluation Patient location during evaluation: PACU Level of consciousness: awake Pain management: adequate Airway patency: patent Anesthetic complications: no 
Cardiovascular status: acceptable Respiratory status: acceptable Hydration status: acceptable Vitals Value Taken Time /89 4/25/2019 11:07 AM  
Temp 36.8 °C (98.3 °F) 4/25/2019 10:59 AM  
Pulse 101 4/25/2019 11:10 AM  
Resp 19 4/25/2019 11:10 AM  
SpO2 90 % 4/25/2019 11:10 AM  
Vitals shown include unvalidated device data.

## 2019-04-25 NOTE — ANESTHESIA PROCEDURE NOTES
Peripheral Block    Start time: 4/25/2019 7:40 AM  End time: 4/25/2019 7:47 AM  Performed by: Cholo Burnett CRNA  Authorized by: Sarita Lea MD       Pre-procedure: Indications: at surgeon's request and post-op pain management    Preanesthetic Checklist: patient identified, risks and benefits discussed, site marked, timeout performed, anesthesia consent given and patient being monitored    Timeout Time: 07:40          Block Type:   Block Type:   Interscalene (interscalene/supraclavicular)  Laterality:  Left  Monitoring:  Continuous pulse ox, frequent vital sign checks, heart rate, responsive to questions and oxygen  Injection Technique:  Single shot  Procedures: ultrasound guided and nerve stimulator    Patient Position: supine  Prep: chlorhexidine    Location:  Interscalene  Needle Type:  Stimuplex  Needle Gauge:  22 G  Needle Localization:  Nerve stimulator and ultrasound guidance    Assessment:  Number of attempts:  1  Injection Assessment:  Incremental injection every 5 mL, local visualized surrounding nerve on ultrasound, negative aspiration for blood, no paresthesia and no intravascular symptoms  Patient tolerance:  Patient tolerated the procedure well with no immediate complications

## 2019-04-25 NOTE — PERIOP NOTES
TRANSFER - OUT REPORT: 
 
Verbal report given to lorraine Henao on Fern Needles  being transferred to [address] [place] [address] for routine post - op Report consisted of patients Situation, Background, Assessment and  
Recommendations(SBAR). Information from the following report(s) SBAR, OR Summary, Procedure Summary, Intake/Output, MAR and Cardiac Rhythm nsr was reviewed with the receiving nurse. Lines:  
Peripheral IV 04/25/19 Anterior;Distal;Right Forearm (Active) Site Assessment Clean, dry, & intact 4/25/2019  7:02 AM  
Phlebitis Assessment 0 4/25/2019  7:02 AM  
Infiltration Assessment 0 4/25/2019  7:02 AM  
Dressing Status Clean, dry, & intact 4/25/2019  7:02 AM  
Dressing Type Transparent;Tape 4/25/2019  7:02 AM  
Hub Color/Line Status Green;Patent; Infusing 4/25/2019  7:02 AM  
Alcohol Cap Used Yes 4/25/2019  7:02 AM  
  
 
Opportunity for questions and clarification was provided. Patient transported with: 
 O2 @ 2 liters Registered Nurse

## 2019-04-25 NOTE — PROGRESS NOTES
4/25/2019 3:55 PM [facility] completed for pt, ref # [id number].  
 
4/25/2019 3:21 PM [facility] on 4/28. Pt's daughters notified and agreeable. 4/25/2019 2:56 PM Case management consult for placement received. Met with pt, pt's daughters Brooke Brewster and Irasema. Reason for Admission: LEFT DISTAL HUMERUS FRACTURE surgery with Dr. Marco Antonio Grant. RRAT Score:  5 Plan for utilizing home health: N/a Current Advanced Directive/Advance Care Plan: On file, pt's daughter Yarelis Sesay is POA. Likelihood of Readmission: low Transition of Care Plan: SNF placement Pt lives alone in [place]. Pt was independent with adls prior to admission. Pt does not drive, her daughters transport pt to appts. Pt has a rollator, wheelchair and cane at home. Pt was using a cane prior to admission. Pt has rx coverage. SNF placement discussed, choices provided, choice letter signed for [facility]. Referrals sent via [facility], called and lvm with admissions at both facilities. Pt will require wheelchair transport vs family transport to [facility], pt's daughters are aware and agreeable. CM will follow for SNF placement admission decisions. ALEXYS Pozo Pt will need 3 midnights inpatient stay prior to SNF placement. Discharge plan: 1. SNF placements pending with [facility]

## 2019-04-25 NOTE — OP NOTES
Orthopedic Admission History and Physical        NAME: Nighat Gonsalez       :  1931       MRN:  [id number]      Subjective:     Patient is a 80 y.o. female who presents with history of L d humerus fx. Presents today for surgical treatment. Patient Active Problem List    Diagnosis Date Noted    Essential hypertension 2017    Daytime somnolence 2015    DDD (degenerative disc disease) 2015    Hyperlipidemia 2015     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, episode of care unspecified     Allergic rhinitis, cause unspecified     Brachial neuritis or radiculitis NOS     Cancer ([facility] [address]     r arm basal cell    Cervicalgia     Disorder of bone and cartilage, unspecified     GERD (gastroesophageal reflux disease)     Hemorrhoid     Hypercholesterolemia     Incontinence of urine     Insomnia, unspecified     Leaky heart valve     Osteoarthrosis, unspecified whether generalized or localized, unspecified site     Osteoporosis, unspecified     Other abnormal blood chemistry     Pain in joint, shoulder region     r shoulder, pelvic, thigh, l hip, l forearm    Unspecified essential hypertension     Unspecified transient cerebral ischemia     Unspecified vitamin D deficiency     UTI (urinary tract infection)       Past Surgical History:   Procedure Laterality Date    HX BUNIONECTOMY      HX CATARACT REMOVAL Bilateral     with L implant, R eye hard contact    HX COLONOSCOPY      HX LAP CHOLECYSTECTOMY      HX PARTIAL HYSTERECTOMY      HX TUBAL LIGATION        Prior to Admission medications    Medication Sig Start Date End Date Taking? Authorizing Provider   amLODIPine (NORVASC) 5 mg tablet Take 1 Tab by mouth daily. 2/15/19  Yes Governor Josselin MD   atorvastatin (LIPITOR) 10 mg tablet Take 1 Tab by mouth daily.  18  Yes Governor Josselin MD   Omeprazole delayed release (PRILOSEC D/R) 20 mg tablet Take 1 Tab by mouth daily. Patient taking differently: Take 20 mg by mouth daily as needed. 9/13/18  Yes Cristian Prakash MD   lisinopril (PRINIVIL, ZESTRIL) 20 mg tablet Take 1 Tab by mouth daily. 8/24/18  Yes Cristian Prakash MD   loratadine (CLARITIN) 10 mg tablet Take 10 mg by mouth daily as needed. Yes Provider, Historical   naproxen sodium (ALEVE) 220 mg cap Take 440 mg by mouth two (2) times a day. Other, MD Sky   ketoconazole (NIZORAL) 2 % topical cream Apply  to affected area daily. Patient taking differently: Apply  to affected area daily. Indications: left leg 10/25/17   Cristian Prakash MD   VIT C/CHERRY & CELERY EX/GRP E (TART CHERRY PO) Take  by mouth.     Provider, Historical     Current Facility-Administered Medications   Medication Dose Route Frequency    lidocaine (PF) (XYLOCAINE) 10 mg/mL (1 %) injection 0.1 mL  0.1 mL SubCUTAneous PRN    lactated Ringers infusion  125 mL/hr IntraVENous CONTINUOUS    lactated ringers bolus infusion 1,000 mL  1,000 mL IntraVENous ONCE    naloxone (NARCAN) injection 0.2 mg  0.2 mg IntraVENous EVERY 2 MINUTES AS NEEDED    flumazenil (ROMAZICON) 0.1 mg/mL injection 0.2 mg  0.2 mg IntraVENous Multiple    lactated Ringers infusion  125 mL/hr IntraVENous CONTINUOUS    ceFAZolin (ANCEF) 2 g/20 mL in sterile water IV syringe  2 g IntraVENous ONCE     Facility-Administered Medications Ordered in Other Encounters   Medication Dose Route Frequency    midazolam (VERSED) injection   IntraVENous PRN    fentaNYL citrate (PF) injection    PRN    ropivacaine (PF) (NAROPIN) 5 mg/mL (0.5 %) injection   Peripheral Nerve Block PRN    ePHEDrine (MISTOLE) 50 mg/mL injection    PRN      Allergies   Allergen Reactions    Egg Diarrhea and Nausea Only    Other Plant, Animal, Environmental Runny Nose and Sneezing      Social History     Tobacco Use    Smoking status: Former Smoker     Packs/day: 0.50     Years: 50.00     Pack years: 25.00     Types: Cigarettes     Last attempt to quit: 3/3/1995     Years since quittin.1    Smokeless tobacco: Never Used   Substance Use Topics    Alcohol use: Yes     Alcohol/week: 14.0 oz     Types: 28 Standard drinks or equivalent per week     Comment: 3 per day      Family History   Problem Relation Age of Onset    Cancer Other     Stroke Mother     Stroke Father     Heart Disease Sister     Heart Disease Brother     Stroke Brother         Review of Systems  A comprehensive review of systems was negative except for that written in the HPI. Objective:     Patient Vitals for the past 8 hrs:   BP Temp Pulse Resp SpO2   19 0640 114/49 98.9 °F (37.2 °C) 95 18 97 %     Temp (24hrs), Av.6 °F (37 °C), Min:98.2 °F (36.8 °C), Max:98.9 °F (37.2 °C)      Physical Exam:  General appearance: alert, cooperative, no distress, appears stated age  Lungs: No use of accessory breathing muscles. Breathing unlabored. Cardiac: Regular rate.   Abdomen: soft, non-tender, non-distended  Extremities: As per prior exam.     Labs:   Recent Results (from the past 24 hour(s))   METABOLIC PANEL, BASIC    Collection Time: 19  4:12 PM   Result Value Ref Range    Sodium 136 136 - 145 mmol/L    Potassium 4.9 3.5 - 5.1 mmol/L    Chloride 104 97 - 108 mmol/L    CO2 27 21 - 32 mmol/L    Anion gap 5 5 - 15 mmol/L    Glucose 94 65 - 100 mg/dL    BUN 18 6 - 20 MG/DL    Creatinine 0.87 0.55 - 1.02 MG/DL    BUN/Creatinine ratio 21 (H) 12 - 20      GFR est AA >60 >60 ml/min/1.73m2    GFR est non-AA >60 >60 ml/min/1.73m2    Calcium 9.3 8.5 - 10.1 MG/DL   EKG, 12 LEAD, INITIAL    Collection Time: 19  4:25 PM   Result Value Ref Range    Ventricular Rate 90 BPM    Atrial Rate 90 BPM    P-R Interval 166 ms    QRS Duration 74 ms    Q-T Interval 330 ms    QTC Calculation (Bezet) 403 ms    Calculated P Axis 44 degrees    Calculated R Axis -52 degrees    Calculated T Axis 38 degrees    Diagnosis       Normal sinus rhythm  Left axis deviation  Inferior infarct (cited on or before 16-JUN-2015)  Anteroseptal infarct , age undetermined  Abnormal ECG  When compared with ECG of 23-SEP-2017 12:33,  Anteroseptal infarct is now present         Assessment:   No medical contraindications to proceeding with planned surgery. Please see initial office note for full discussion of risks, benefits, and alternatives to surgery. Patient Active Problem List    Diagnosis Date Noted    Essential hypertension 09/29/2017    Daytime somnolence 05/08/2015    DDD (degenerative disc disease) 05/08/2015    Hyperlipidemia 05/08/2015         Plan:   Proceed with surgery  Pt. stable  Pt.  NPO x meds

## 2019-04-25 NOTE — ANESTHESIA PREPROCEDURE EVALUATION
Relevant Problems No relevant active problems Anesthetic History No history of anesthetic complications Review of Systems / Medical History Patient summary reviewed, nursing notes reviewed and pertinent labs reviewed Pulmonary Within defined limits Neuro/Psych Within defined limits Comments: Head CT 2017: Age indeterminate microvascular ischemic disease. No acute intracranial 
hemorrhage or mass effect. Cervical Spine MRI:IMPRESSION: 
1. Multilevel degenerative change detailed by level above most significant  
at C6-C7 C6-C7: There is a diffuse disc osteophytic bulge with bilateral  
uncovertebral degenerative change. Canal stenosis is mild with moderate  
bilateral foraminal narrowing 
  
 
 
 
                      
 
 
    
 
 Cardiovascular Hypertension Hyperlipidemia Pertinent negatives: No past MI and CAD Exercise tolerance: >4 METS Comments: Echo 7/17 Left ventricle: Systolic function was normal. Ejection fraction was 
estimated in the range of 60 % to 65 %. There were no regional wall motion 
abnormalities. Wall thickness was mildly increased. Doppler parameters 
were consistent with abnormal left ventricular relaxation (grade 1 
diastolic dysfunction). Left atrium: The atrium was mildly dilated. Mitral valve: There was mild annular calcification. Tricuspid valve: There was mild regurgitation. Aortic Valve: very mild stenosis GI/Hepatic/Renal 
  
GERD Endo/Other Arthritis Other Findings Physical Exam 
 
Airway Mallampati: II 
TM Distance: 4 - 6 cm Neck ROM: decreased range of motion Mouth opening: Normal 
 
 Cardiovascular Rhythm: regular Rate: normal 
 
Murmur, Aortic area Dental 
 
Dentition: Full upper dentures Pulmonary Abdominal 
 
 
 
 Other Findings Anesthetic Plan ASA: 3 Anesthesia type: regional 
 
 
 
 
Induction: Intravenous Anesthetic plan and risks discussed with: Patient

## 2019-04-25 NOTE — DISCHARGE INSTRUCTIONS
Upper Extremity Surgery Discharge Instructions  Dr. Aysha Phillips    Please take the time to review the following instructions before you leave the hospital and use them as guidelines during your recovery from surgery. If you have any questions, you may contact my office at [phone]. Wound Care / Dressing Change    Do NOT remove your dressing or get them wet. [place] / Bathing    May bathe/shower as long as dressing/splint/cast is kept dry    Sling    Keep your arm in the immobilizer/sling at all times except when showering and changing your clothes. When showering or changing, keep your arm at your side. Do not move it away from your body. Activity    No lifting with your affected arm. Please begin using fingers immediately after surgery, working to improve motion of straightening and flexing your fingers several times per day. No driving until further notice. Ice and Elevation    Continue ice consistently for 48 hours after surgery. After 48 hours, you should ice 3 times per day for 20 minutes at a time for the next 5 days. After 1 week from surgery, you may use ice as needed for pain. Diet    You may advance your regular diet as tolerated. Increase your clear liquid intake for the next 2-3 days. Medications    1. You will be given prescriptions for pain medication, and nausea when you are discharged from the hospital. Please use the medications as prescribed. Pain medications may cause constipation - over the counter Colace or Milk of Magnesia may be used as needed. Other possible side effects of pain medications are dizziness, headache, nausea, vomiting, and urinary retention. Discontinue the pain medication if you develop itching, rash, shortness of breath, or difficulties swallowing. If these symptoms become severe or arent relieved by discontinuing the medication, you should seek immediate medical attention.    2. Refills of pain medication are authorized during office hours only (8AM - 5PM Monday through Friday)  3. If you pain medication prescribed at the time of surgery contains Tylenol/Acetaminophen, DO NOT TAKE additional Tylenol/Acetaminophen. Do not exceed 4000mg of Tylenol/Acetaminophen per day. 4. You may resume the medication you were taking prior to your surgery. Pain medication may change the effects of any antidepressant medication you may be taking. If you have any questions about possible interactions between your regular medication and the pain medication, you should consult the physician who prescribes your regular medications. 5. Do not drive until further notice. 6. You were prescribed a nausea medication. It is only necessary to fill this if you are experiencing nausea. Important Signs and Symptoms    Please call Dr. Temo Nguyen office at [phone] if you have any increasing numbness or tingling, increasing drainage on your dressing, fever greater than 100.5 degrees F or pain not controlled by medications. If you are experiencing chest pain or shortness of breath, please alert your primary care physician immediately.

## 2019-04-25 NOTE — PROGRESS NOTES
TRANSFER - IN REPORT: 
 
Verbal report received from [facility]) on Monae Garcia  being received from [facility]) for routine post - op Report consisted of patients Situation, Background, Assessment and  
Recommendations(SBAR). Information from the following report(s) SBAR, Kardex, [place] and Recent Results was reviewed with the receiving nurse. Opportunity for questions and clarification was provided. Assessment completed upon patients arrival to unit and care assumed. Patient c/o slight pain. Patient unable to tolerate benoit prior to surgery. Spoke with PA> New orders entered for Tramadol. Bedside and Verbal shift change report given to [address] [place] [address] (oncoming nurse) by BEN Flores RN (offgoing nurse). Report included the following information SBAR, Kardex, MAR and Recent Results.

## 2019-04-26 PROCEDURE — 74011250637 HC RX REV CODE- 250/637: Performed by: ORTHOPAEDIC SURGERY

## 2019-04-26 PROCEDURE — 97116 GAIT TRAINING THERAPY: CPT

## 2019-04-26 PROCEDURE — 74011250637 HC RX REV CODE- 250/637: Performed by: PHYSICIAN ASSISTANT

## 2019-04-26 PROCEDURE — 97161 PT EVAL LOW COMPLEX 20 MIN: CPT

## 2019-04-26 PROCEDURE — 65270000029 HC RM PRIVATE

## 2019-04-26 PROCEDURE — 97535 SELF CARE MNGMENT TRAINING: CPT

## 2019-04-26 PROCEDURE — 97165 OT EVAL LOW COMPLEX 30 MIN: CPT

## 2019-04-26 PROCEDURE — 74011250636 HC RX REV CODE- 250/636: Performed by: ORTHOPAEDIC SURGERY

## 2019-04-26 RX ADMIN — TRAMADOL HYDROCHLORIDE 50 MG: 50 TABLET, FILM COATED ORAL at 04:02

## 2019-04-26 RX ADMIN — TRAMADOL HYDROCHLORIDE 50 MG: 50 TABLET, FILM COATED ORAL at 04:21

## 2019-04-26 RX ADMIN — Medication 10 ML: at 13:53

## 2019-04-26 RX ADMIN — ATORVASTATIN CALCIUM 10 MG: 10 TABLET, FILM COATED ORAL at 10:04

## 2019-04-26 RX ADMIN — SENNOSIDES, DOCUSATE SODIUM 1 TABLET: 50; 8.6 TABLET, FILM COATED ORAL at 10:04

## 2019-04-26 RX ADMIN — Medication 10 ML: at 22:00

## 2019-04-26 RX ADMIN — ACETAMINOPHEN 1000 MG: 500 TABLET ORAL at 21:30

## 2019-04-26 RX ADMIN — Medication 5 ML: at 06:00

## 2019-04-26 RX ADMIN — ACETAMINOPHEN 1000 MG: 500 TABLET ORAL at 05:55

## 2019-04-26 RX ADMIN — Medication 2 G: at 01:07

## 2019-04-26 RX ADMIN — Medication 2 G: at 10:04

## 2019-04-26 RX ADMIN — TRAMADOL HYDROCHLORIDE 100 MG: 50 TABLET, FILM COATED ORAL at 17:04

## 2019-04-26 RX ADMIN — LISINOPRIL 20 MG: 20 TABLET ORAL at 10:04

## 2019-04-26 RX ADMIN — SENNOSIDES, DOCUSATE SODIUM 1 TABLET: 50; 8.6 TABLET, FILM COATED ORAL at 17:04

## 2019-04-26 RX ADMIN — AMLODIPINE BESYLATE 5 MG: 5 TABLET ORAL at 10:04

## 2019-04-26 RX ADMIN — TRAMADOL HYDROCHLORIDE 100 MG: 50 TABLET, FILM COATED ORAL at 10:04

## 2019-04-26 RX ADMIN — ACETAMINOPHEN 1000 MG: 500 TABLET ORAL at 13:52

## 2019-04-26 NOTE — ROUTINE PROCESS
Verbal shift change report given to [address] (oncoming nurse) by Yohan Dias (offgoing nurse). Report included the following information SBAR, Kardex, Intake/Output, MAR and Recent Results.

## 2019-04-26 NOTE — PROGRESS NOTES
Problem: Self Care Deficits Care Plan (Adult) Goal: *Acute Goals and Plan of Care (Insert Text) Description Occupational Therapy Goals Initiated 4/26/2019 1. Patient will perform lower body dressing with minimal assistance/contact guard assist within 7 day(s). 2.  Patient will perform upper body dressing with minimal assistance/contact guard assist within 7 day(s). 3.  Patient will perform grooming, standing at sink, with supervision/set-up within 7 day(s). 4.  Patient will perform toilet transfers with supervision/set-up within 7 day(s). 5.  Patient will perform all aspects of toileting with supervision/set-up within 7 day(s). Outcome: Progressing Towards Goal 
 OCCUPATIONAL THERAPY EVALUATION Patient: Adia Siddiqui (72 y.o. female) Date: 4/26/2019 Primary Diagnosis: Humerus distal fracture [S42.409A] Procedure(s) (LRB): OPEN REDUCTION INTERNAL FIXATION DISTAL HUMERUS (Left) 1 Day Post-Op Precautions: NWB LUE    
 
ASSESSMENT : 
Based on the objective data described below, the patient presents with hospital admission secondary to L distal humerus fracture, resulting from ground level fall. Patient now POD 1 following ORIF. Patient received in bed agreeable to activity. She complains of pain at surgery site. Patient reports living alone, mod I to independent with ADL tasks and requires assist from family for transportation. Patient today requires min assist to manage bed mobility using bed rail for additional assistance. She performs sit to stand with CGA but requires min assist for transfer to commode/ chair. Patient  transferred to chair and OT assisted with contact application to R eye. Patient appreciative. She performed additional grooming tasks with min assist.  Patient lives alone and reports family not available to assist upon discharge.  At this time , MD requesting no ROM x 2 weeks and will change to supportive splint in 3 days.  She requires assistance for ADL task and and safety with mobility. Recommend patient discharge to rehab setting at discharge. Patient will benefit from skilled intervention to address the above impairments. Patient?s rehabilitation potential is considered to be Good Factors which may influence rehabilitation potential include: ? None noted ? Mental ability/status ? Medical condition ? Home/family situation and support systems ? Safety awareness ? Pain tolerance/management ? Other: PLAN : 
Recommendations and Planned Interventions: 
?               Self Care Training                  ? Therapeutic Activities ? Functional Mobility Training    ? Cognitive Retraining 
? Therapeutic Exercises           ? Endurance Activities ? Balance Training                   ? Neuromuscular Re-Education ? Visual/Perceptual Training     ? Home Safety Training 
? Patient Education                 ? Family Training/Education ? Other (comment): Frequency/Duration: Patient will be followed by occupational therapy 5 times a week to address goals. Discharge Recommendations: Praful Mcclain Further Equipment Recommendations for Discharge: TBD SUBJECTIVE:  
Patient stated ? thank you so much. ? OBJECTIVE DATA SUMMARY:  
HISTORY:  
Past Medical History:  
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   
 r arm basal cell Cervicalgia Disorder of bone and cartilage, unspecified GERD (gastroesophageal reflux disease) Hemorrhoid Hypercholesterolemia Incontinence of urine Insomnia, unspecified Leaky heart valve Osteoarthrosis, unspecified whether generalized or localized, unspecified site Osteoporosis, unspecified Other abnormal blood chemistry Pain in joint, shoulder region   
 r shoulder, pelvic, thigh, l hip, l forearm Unspecified essential hypertension Unspecified transient cerebral ischemia Unspecified vitamin D deficiency UTI (urinary tract infection) Past Surgical History:  
Procedure Laterality Date HX BUNIONECTOMY HX CATARACT REMOVAL Bilateral   
 with L implant, R eye hard contact HX COLONOSCOPY    
 HX LAP CHOLECYSTECTOMY  2013 HX PARTIAL HYSTERECTOMY [facility] Prior Level of Function/Environment/Context: independent Occupations in which the patient is/was successful, what are the barriers preventing that success:  
Performance Patterns (routines, roles, habits, and rituals):  
Personal Interests and/or values:  
Expanded or extensive additional review of patient history:  
 
Home Situation Home Environment: Private residence # Steps to Enter: 6 One/Two Story Residence: Two story Lift Chair Available: No 
Living Alone: Yes Support Systems: Family member(s) Patient Expects to be Discharged to[de-identified] Rehabilitation facility Current DME Used/Available at Home: Cane, straight, Wheelchair Tub or Shower Type: Shower Hand dominance: Right EXAMINATION OF PERFORMANCE DEFICITS: 
Cognitive/Behavioral Status: 
Neurologic State: Alert Orientation Level: Oriented X4 Cognition: Follows commands Perception: Appears intact Perseveration: No perseveration noted Safety/Judgement: Awareness of environment Skin: intact as seen Edema: LUE Hearing: Auditory Auditory Impairment: Hard of hearing, bilateral 
Hearing Aids/Status: Does not own Vision/Perceptual:   
    
    
    
  
    
    
Corrective Lenses: Glasses(with contact in R eye) Range of Motion: 
AROM: Within functional limits(LUE not tested ) PROM: Within functional limits Left hand digits with minimal active motion. Noted swelling. Education patient regarding positioning and attempt for flexion/extension of digits due to reduce swelling and increase ROM Strength: 
Strength: Generally decreased, functional 
  
  
  
  
Coordination: 
Coordination: Generally decreased, functional 
Fine Motor Skills-Upper: Left Impaired;Right Impaired Gross Motor Skills-Upper: Left Impaired;Right Intact Tone & Sensation: 
Tone: Normal 
Sensation: Intact(numbness to digits in R hand) Balance: 
Sitting: Intact Standing: With support Standing - Static: Good Standing - Dynamic : Fair Functional Mobility and Transfers for ADLs: 
Bed Mobility: 
Supine to Sit: Minimum assistance;Assist x1;Additional time Sit to Supine: (not tested,  OOB to chair) Scooting: Minimum assistance Transfers: 
Sit to Stand: Contact guard assistance Stand to Sit: Contact guard assistance Bed to Chair: Minimum assistance Toilet Transfer : Minimum assistance ADL Assessment: 
Feeding: Setup Oral Facial Hygiene/Grooming: Minimum assistance Bathing: Moderate assistance Upper Body Dressing: Moderate assistance Lower Body Dressing: Moderate assistance Toileting: Minimum assistance ADL Intervention and task modifications: 
  
 
  
 
  
 
  
 
  
 
  
 
  
 
Cognitive Retraining Safety/Judgement: Awareness of environment Therapeutic Exercise: 
 
 
Functional Measure: 
Barthel Index: 
Bathin Bladder: 0 Bowels: 10 
Groomin Dressin Feedin Mobility: 10 Stairs: 0 Toilet Use: 5 Transfer (Bed to Chair and Back): 10 Total: 45/100 Percentage of impairment  
0% 1-19% 20-39% 40-59% 60-79% 80-99% 100% Barthel Score 0-100 100 99-80 79-60 59-40 20-39 1-19 
 0 The Barthel ADL Index: Guidelines 1.  The index should be used as a record of what a patient does, not as a record of what a patient could do. 2. The main aim is to establish degree of independence from any help, physical or verbal, however minor and for whatever reason. 3. The need for supervision renders the patient not independent. 4. A patient's performance should be established using the best available evidence. Asking the patient, friends/relatives and nurses are the usual sources, but direct observation and common sense are also important. However direct testing is not needed. 5. Usually the patient's performance over the preceding 24-48 hours is important, but occasionally longer periods will be relevant. 6. Middle categories imply that the patient supplies over 50 per cent of the effort. 7. Use of aids to be independent is allowed. Em Taylor., Barthel, D.W. (2141). Functional evaluation: the Barthel Index. [address] [facility] (14)2. ARABELLA Hogan, Miguel Smith., Oniel Ty., [place], [address] (1999). Measuring the change indisability after inpatient rehabilitation; comparison of the responsiveness of the Barthel Index and Functional Rose Measure. Journal of Neurology, Neurosurgery, and Psychiatry, 66(4), 704-058. Brandon Loyd, N.J.A, NESSA Hays, & Isael Gonsalez M.A. (2004.) Assessment of post-stroke quality of life in cost-effectiveness studies: The usefulness of the Barthel Index and the EuroQoL-5D. [facility], 13, 232-27 Occupational Therapy Evaluation Charge Determination History Examination Decision-Making LOW Complexity : Brief history review  LOW Complexity : 1-3 performance deficits relating to physical, cognitive , or psychosocial skils that result in activity limitations and / or participation restrictions  LOW Complexity : No comorbidities that affect functional and no verbal or physical assistance needed to complete eval tasks Based on the above components, the patient evaluation is determined to be of the following complexity level: LOW Pain: 
Pain Scale 1: Numeric (0 - 10) Pain Intensity 1: 6 Activity Tolerance: VSS Please refer to the flowsheet for vital signs taken during this treatment. After treatment:  
? Patient left in no apparent distress sitting up in chair ? Patient left in no apparent distress in bed 
? Call bell left within reach ? Nursing notified ? Caregiver present ? Bed alarm activated- pad on patient, no box in room. RN notified COMMUNICATION/EDUCATION:  
The patient?s plan of care was discussed with: Registered Nurse. 
? Home safety education was provided and the patient/caregiver indicated understanding. ? Patient/family have participated as able in goal setting and plan of care. ? Patient/family agree to work toward stated goals and plan of care. ? Patient understands intent and goals of therapy, but is neutral about his/her participation. ? Patient is unable to participate in goal setting and plan of care. This patient?s plan of care is appropriate for delegation to [facility] [place]. Thank you for this referral. 
Dennis Jacobsen, OTR/L Time Calculation: 30 mins

## 2019-04-26 NOTE — PROGRESS NOTES
4/26/2019 4:34 PM CM called pt's daughter and discussed transport on 4/28. Pt's daughter reported her or her sister can transport pt to [facility] if transport is not found by [facility]. Pt's daughter is aware if transport is not to [facility] by 2:30PM on 4/28, her sister will transport pt to [facility] 4/26/2019 4:25 PM [facility] Elza Pacer who reported they are still working on trying to staff pt's transport for 4/28.  
 
4/26/2019 2:59 PM Pt to discharge to [facility] on 4/28 via wheelchair Latricia sadiq at 2:30PM. CM scheduled wheelchair transport through [facility]([phone]). Pt's daughter, Elisa Herman and pt are aware and agreeable. Pt's updated and avs were sent to [facility] via [employer]. Called and lvm with admissions at [facility]. Pt signed 2nd IM letter, filed on hard chart. Nursing on day of discharge, please contact pt's daughter, Elisa Herman to confirm discharge([phone]). Nursing please call nurse supervisor at [facility] at [phone] to notify of pt's discharge and request report number and fax number. Please call report to number given by [facility] nurse supervisor. Please fax pt's avs, mars, scripts and discharge summary to fax number provided by [facility] nurse supervisor. Please add these clinicals to pt's wheelchair packet which is started on pt's hard chart.

## 2019-04-26 NOTE — PROGRESS NOTES
Shift Summary Bedside and Verbal shift change report given to Kota Ireland RN (oncoming nurse) by [facility] (offgoing nurse). Report included the following information SBAR, Kardex, MAR and Recent Results. Patient requesting to keep almeida in. Education provided. Oncoming nurse, Wellington Mendez to remove almeida. Bedside and Verbal shift change report given to [address] (oncoming nurse) by BEN Lai RN (offgoing nurse). Report included the following information SBAR, Kardex, MAR and Recent Results.

## 2019-04-26 NOTE — PROGRESS NOTES
Patient visited by [facility] Partner Volunteer on [address] [facility] on 4/26/19. Rev. Cami Acosta, [facility]  paging service: [phone])

## 2019-04-26 NOTE — PROGRESS NOTES
Problem: Mobility Impaired (Adult and Pediatric) Goal: *Acute Goals and Plan of Care (Insert Text) Description Physical Therapy Goals Initiated 4/26/2019 1. Patient will move from supine to sit and sit to supine , scoot up and down and roll side to side in bed with minimal assistance/contact guard assist within 7 day(s). 2.  Patient will transfer from bed to chair and chair to bed with SBA using the least restrictive device within 7 day(s). 3.  Patient will perform sit <> stand with SBA within 7 day(s). 4.  Patient will ambulate with minimal assistance/contact guard assist for 80 feet with the least restrictive device within 7 day(s). 5.  Patient will ascend/descend 6 stairs with 1 handrail(s) with minimal assistance/contact guard assist within 7 day(s). Note: PHYSICAL THERAPY EVALUATION Patient: Haley Peters (21 y.o. female) Date: 4/26/2019 Primary Diagnosis: Humerus distal fracture [S42.409A] Procedure(s) (LRB): OPEN REDUCTION INTERNAL FIXATION DISTAL HUMERUS (Left) 1 Day Post-Op Precautions:  Bed Alarm, Fall(NWB LUE) ASSESSMENT : 
Based on the objective data described below, the patient presents with pain LUE, lack of strength and ROM LUE with splint in place so LUE is extended at elbow. Patient with poor endurance and poor standing balance and reports she is fatigued for being OOB in chair too long this am. Patient scored 8/28 on Tinetti Test indicating high risk for falls. Patient adm 4/25 following a GLF and LUE distal femur fx. PMH and PSH significant for MI,cardiac hx, TIA, osteoporosis, OA, incontinence and frequent UTIs. Patient insistent to use her [facility] for amb today but she would benefit from quad cane trial. She is not optimistic to using quad cane as she has tried them in the past under different circumstances.  Patient needing heavy mod assist for mobility and for amb a total of 79' with rest breaks this am. She rested her left forearm and wrist elevated up on PT's forearm to reduce pain from LUE being dependent. In 3 days plans for more supportive splint per surgeon's note. Patient lives alone and was completely independent with use of her [facility] prior to this GLF. She does describes two episodes of falls without fracture in recent past. She is awaiting placement at [facility]. Recommend daily PT for progressive mobilization and increasing independence as well as SNF for rehab at discharge. Patient fatigued this am for PT so likely why needing more assistance than early am with OT. Fatigued post activity. VSS throughout evaluation. Fall prevention reviewed and patient indicated her understanding. Bed alarm set. Patient will benefit from skilled intervention to address the above impairments. Patient?s rehabilitation potential is considered to be Fair Factors which may influence rehabilitation potential include:  
? None noted ? Mental ability/status ? Medical condition ? Home/family situation and support systems ? Safety awareness 
? Pain tolerance/management 
? Other: age PLAN : 
Recommendations and Planned Interventions: 
?           Bed Mobility Training             ? Neuromuscular Re-Education ? Transfer Training                   ? Orthotic/Prosthetic Training 
? Gait Training                         ? Modalities ? Therapeutic Exercises           ? Edema Management/Control ? Therapeutic Activities            ? Patient and Family Training/Education ? Other (comment): Frequency/Duration: Patient will be followed by physical therapy  daily to address goals. Discharge Recommendations: Praful Mcclain Further Equipment Recommendations for Discharge: TBD in rehab SUBJECTIVE:  
Patient stated ? I've tried a quad cane in past and it just doesn't work for me.? OBJECTIVE DATA SUMMARY:  
HISTORY:   
 Past Medical History:  
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   
 r arm basal cell Cervicalgia Disorder of bone and cartilage, unspecified GERD (gastroesophageal reflux disease) Hemorrhoid Hypercholesterolemia Incontinence of urine Insomnia, unspecified Leaky heart valve Osteoarthrosis, unspecified whether generalized or localized, unspecified site Osteoporosis, unspecified Other abnormal blood chemistry Pain in joint, shoulder region   
 r shoulder, pelvic, thigh, l hip, l forearm Unspecified essential hypertension Unspecified transient cerebral ischemia Unspecified vitamin D deficiency UTI (urinary tract infection) Past Surgical History:  
Procedure Laterality Date HX BUNIONECTOMY HX CATARACT REMOVAL Bilateral   
 with L implant, R eye hard contact HX COLONOSCOPY    
 HX LAP CHOLECYSTECTOMY  2013 HX PARTIAL HYSTERECTOMY [address] [facility] Prior Level of Function/Home Situation: independent but few falls and episodes of near falls \"I get a hold on furniture\" Personal factors and/or comorbidities impacting plan of care: 79 yo, pain, home alone, recent  (jan 2019) Home Situation Home Environment: Private residence # Steps to Enter: 6 Rails to Enter: Yes Hand Rails : Bilateral 
One/Two Story Residence: (shower on second floor) Lift Chair Available: No 
Living Alone: Yes Support Systems: Family member(s) Patient Expects to be Discharged to[de-identified] Rehabilitation facility Current DME Used/Available at Home: Cane, straight, Wheelchair Tub or Shower Type: Shower EXAMINATION/PRESENTATION/DECISION MAKING:  
Critical Behavior: 
Neurologic State: Alert Orientation Level: Oriented X4 Cognition: Follows commands Safety/Judgement: Awareness of environment Hearing: Auditory Auditory Impairment: Hard of hearing, bilateral 
Hearing Aids/Status: Does not own Range Of Motion: 
AROM: Within functional limits(LUE not tested ) PROM: Within functional limits Strength:   
Strength: Generally decreased, functional 
  
  
  
  
  
  
Tone & Sensation:  
Tone: Normal 
  
  
  
  
Sensation: Intact(numbness to digits in R hand) Coordination: 
Coordination: Generally decreased, functional 
Vision:  
Corrective Lenses: Glasses(with contact in R eye) Functional Mobility: 
Bed Mobility: 
Rolling: Moderate assistance Supine to Sit: Minimum assistance;Assist x1;Additional time Sit to Supine: Minimum assistance Scooting: Minimum assistance Transfers: 
Sit to Stand: Minimum assistance; Additional time Stand to Sit: Minimum assistance; Additional time Stand Pivot Transfers: Moderate assistance; Additional time Bed to Chair: Minimum assistance; Additional time;Assist x1 Balance:  
Sitting: Intact Standing: With support Standing - Static: Good Standing - Dynamic : Fair Ambulation/Gait Training: 
Distance (ft): 35 Feet (ft)(x2) 
Assistive Device: Cane, straight;Gait belt Ambulation - Level of Assistance: Moderate assistance; Additional time;Assist x1 Gait Abnormalities: Antalgic;Decreased step clearance; Path deviations;Trunk sway increased Base of Support: Narrowed; Shift to left Speed/Ewelina: Slow Stairs - Level of Assistance: (NT) Functional Measure: 
Tinetti test: 
 
Sitting Balance: 1 Arises: 0 Attempts to Rise: 0 Immediate Standing Balance: 0 Standing Balance: 0 Nudged: 0 Eyes Closed: 0 Turn 360 Degrees - Continuous/Discontinuous: 0 Turn 360 Degrees - Steady/Unsteady: 0 Sitting Down: 0 Balance Score: 1 Indication of Gait: 0 
R Step Length/Height: 1 L Step Length/Height: 1 
R Foot Clearance: 1 L Foot Clearance: 1 Step Symmetry: 1 Step Continuity: 0 Path: 1 Trunk: 0 Walking Time: 1 Gait Score: 7 Total Score: 8 Tinetti Tool Score Risk of Falls 
<19 = High Fall Risk 19-24 = Moderate Fall Risk 25-28 = Low Fall Risk Tinetti ME. Performance-Oriented Assessment of Mobility Problems in Elderly Patients. Estes 66; [id number]. (Scoring Description: PT Bulletin Feb. 10, 1993) Older adults: Leonel De Paz et al, 2009; n = [address] elderly evaluated with ABC, BRITNEY, ADL, and IADL) · Mean BRITNEY score for males aged 69-68 years = 26.21(3.40) · Mean BRITNEY score for females age 69-68 years = 25.16(4.30) · Mean BRITNEY score for males over 80 years = 23.29(6.02) · Mean BRITNEY score for females over 80 years = 17.20(8.32) Physical Therapy Evaluation Charge Determination History Examination Presentation Decision-Making HIGH Complexity :3+ comorbidities / personal factors will impact the outcome/ POC  HIGH Complexity : 4+ Standardized tests and measures addressing body structure, function, activity limitation and / or participation in recreation  MEDIUM Complexity : Evolving with changing characteristics  Other outcome measures Tinetti Test  HIGH Based on the above components, the patient evaluation is determined to be of the following complexity level: MEDIUM Pain: 
Pain Scale 1: Numeric (0 - 10) Pain Intensity 1: 6 Activity Tolerance:  
Poor- dyspneic with activity , VSS Please refer to the flowsheet for vital signs taken during this treatment. After treatment:  
?         Patient left in no apparent distress sitting up in chair ? Patient left in no apparent distress in bed 
? Call bell left within reach ? Nursing notified ? Caregiver present ? Bed alarm activated COMMUNICATION/EDUCATION:  
The patient?s plan of care was discussed with: Registered Nurse. ?         Fall prevention education was provided and the patient/caregiver indicated understanding. ?          Patient/family have participated as able in goal setting and plan of care. ?         Patient/family agree to work toward stated goals and plan of care. ?         Patient understands intent and goals of therapy, but is neutral about his/her participation. ? Patient is unable to participate in goal setting and plan of care. Thank you for this referral. 
Kiko Farias, PT, DPT Time Calculation: 27 mins

## 2019-04-26 NOTE — PROGRESS NOTES
ASSESSMENT Lawanda Trejo is a 80 y.o. female, who is POD# 1 s/p ORIF L d humerus fx with olecranon osteotomy PLAN 1. Pain control 2. Mobilize with PT / OT. Weight bearing Status : NWB 3. DVT Prophylaxis : Mechanical  
4. Disposition : Case Management for planning. Sowmya Mendoza MD 
Office : [phone] Cell: [phone] Subjective:  
 
Resting, complaining of appropriate pain. No new numbness Objective Objective:  
 
Patient Vitals for the past 12 hrs: 
 BP Temp Pulse Resp SpO2  
04/26/19 0715 101/59 98.3 °F (36.8 °C) 87 20 93 % 04/26/19 0441 131/67 98.4 °F (36.9 °C) 97 20 94 % 04/25/19 2333 103/48 98.2 °F (36.8 °C) (!) 112 18 96 % GENERAL: No acute distress. MUSCULOSKELETAL EXAM 
Left upper extremity - Dressing in tact. Fingers warm and well perfused. Sensation grossly in tact to light touch over 1st dorsal web space, tips of long and small fingers. +Thumb up, , finger extension, finger spread, OK. LABS : 
Recent Labs  
  04/24/19 
1612   
K 4.9  CO2 27 BUN 18 CREA 0.87 GLU 94 No results found for: SDES

## 2019-04-26 NOTE — OP NOTES
[facility]itton [facility] 79  OPERATIVE REPORT    Name:  Gricelda Avila  MR#:  [id number]  :  1931  ACCOUNT #:  [de-identified]  DATE OF SERVICE:  2019    PREOPERATIVE DIAGNOSIS:  Left distal humerus fracture, extraarticular. POSTOPERATIVE DIAGNOSIS:  Comminuted intraarticular left distal humerus fracture. PROCEDURE PERFORMED:  Open reduction and internal fixation of left distal humerus fracture with intraarticular extension. SURGEON:  Dapheny Almodovar MD    ASSISTANT:  Saskia Honeycutt    ANESTHESIA:  Regional and general.    COMPLICATIONS:  None. SPECIMENS REMOVED:  None. IMPLANTS:  Synthes cannulated 3.0 mm screws in the capitellum and a medial Synthes elbow plate with locking and nonlocking screws. 6.5mm cannulated screw for olecranon osteotomy  ESTIMATED BLOOD LOSS:  150 mL. FINDINGS:  Comminuted intraarticular fracture with notable metaphyseal comminution. There is also a separate capitellar piece as well as a large lateral condylar/trochlear piece. INDICATION FOR PROCEDURE:  The patient is a pleasant, highly-functioning 49-year-old female who lives independently who sustained a fall at home and was seen in the hospital and referred to me for further treatment. We discussed treatment options and she elected to proceed with the above, understanding the risk of bleeding, infection, damage to surrounding nerves and blood vessels, persistent pain symptoms, loss of function, failure to heal, hardware complications, need for further surgery, in particular total elbow arthroplasty. She signed informed consent. DESCRIPTION OF PROCEDURE:  The patient was seen and identified in the preanesthesia care unit. The operative site was marked. Preoperative questions were invited and answered.   The patient was evaluated by the anesthesia team.  A brachial plexus block was placed and she was transferred to the operating suite on a stretcher and transferred to the operating table.  General anesthesia was induced. All bony prominences were well padded. She was placed in lateral decubitus position. She was prepped and draped in the usual fashion. A timeout was undertaken confirming the appropriate site and side of the procedure. An axillary roll was placed underneath prior to prep. She received Ancef prior to proceeding. Next, a curvilinear incision was made with the posterior aspect of the elbow. Full-thickness skin flaps were elevated. Wound was made medially and laterally of the distal humerus elevating the triceps. This required ulnar neurolysis, extending down into the FCU aponeurosis. The nerve was gently retracted using a vessel loop throughout the entirety of the case. Upon examining the fracture fragments, there was noted to be multiple comminuted pieces which was not visible on plain films. As such, an olecranon osteotomy was created. This was pre-drilled using the guidewire for the 6.5 mm cannulated screws. Appropriate alignment and trajectory of the wire was confirmed using fluoroscopy and then this was pre-drilled. At this point, a chevron osteotomy was made with the lesser third of the osteotomy being made with an osteotome to allow for interdigitation repair. The triceps was retracted and the fracture was exposed. Unfortunately, there was notable metaphyseal comminution which necessitated shortening of the metaphysis in order to get an appropriate bony apposition. This was notable in particular medially. The articular surface was aligned with direct manipulation of fragments. There were some missing pieces of the trochlea. This was provisionally stabilized with K-wires though there was a small gap between the trochlea and capitellum because of bone loss/commiution. Fluoroscopy demonstrated appropriate metaphysial reconstruction and articular alignment.   At this point, a medial plate was applied and there was a slight gap between the medial metaphysis and the proximal aspect of the plate. However, the articular surface maintained good alignment. As many screws as possible were inserted into the medial piece, however. Given the trajectory of the screws, these pieces were shorter than ideal; however, excellent stability was still achieved through passive range of motion. The capitella piece was then secured using three 3.0 mm headless compression screws buried below the articular surface. The wound was irrigated. The osteotomy was reconstructed using a previously measured 6.5 mm cannulated screw with excellent correction of the osteotomy. Final radiographs were taken confirming the appropriate alignment of the articular surface, position of the hardware and compression of the osteotomy. Tourniquet was let down. Hemostasis was obtained with bipolar cautery. Wounds were closed in layers with staples in the skin. A bulky dressing was applied and the elbow was splinted in extension. POSTOPERATIVE PLAN:  Transition to a posterior elbow splint at 90 degrees in approximately three days but would defer range of motion for two weeks given the comminution and fragility of the bone.         MD EFE Askew/V_[id number]/  D:  04/25/2019 11:29  T:  04/25/2019 19:31  JOB #:  [id number]

## 2019-04-27 PROCEDURE — 65270000029 HC RM PRIVATE

## 2019-04-27 PROCEDURE — 74011250637 HC RX REV CODE- 250/637: Performed by: PHYSICIAN ASSISTANT

## 2019-04-27 PROCEDURE — 97116 GAIT TRAINING THERAPY: CPT

## 2019-04-27 PROCEDURE — 74011250637 HC RX REV CODE- 250/637: Performed by: ORTHOPAEDIC SURGERY

## 2019-04-27 RX ADMIN — Medication 10 ML: at 14:27

## 2019-04-27 RX ADMIN — ACETAMINOPHEN 1000 MG: 500 TABLET ORAL at 21:35

## 2019-04-27 RX ADMIN — ACETAMINOPHEN 1000 MG: 500 TABLET ORAL at 14:26

## 2019-04-27 RX ADMIN — SENNOSIDES, DOCUSATE SODIUM 1 TABLET: 50; 8.6 TABLET, FILM COATED ORAL at 09:26

## 2019-04-27 RX ADMIN — TRAMADOL HYDROCHLORIDE 100 MG: 50 TABLET, FILM COATED ORAL at 03:34

## 2019-04-27 RX ADMIN — LISINOPRIL 20 MG: 20 TABLET ORAL at 09:26

## 2019-04-27 RX ADMIN — SENNOSIDES, DOCUSATE SODIUM 1 TABLET: 50; 8.6 TABLET, FILM COATED ORAL at 18:34

## 2019-04-27 RX ADMIN — ACETAMINOPHEN 1000 MG: 500 TABLET ORAL at 06:43

## 2019-04-27 RX ADMIN — TRAMADOL HYDROCHLORIDE 50 MG: 50 TABLET, FILM COATED ORAL at 20:17

## 2019-04-27 RX ADMIN — AMLODIPINE BESYLATE 5 MG: 5 TABLET ORAL at 09:26

## 2019-04-27 RX ADMIN — ATORVASTATIN CALCIUM 10 MG: 10 TABLET, FILM COATED ORAL at 09:26

## 2019-04-27 RX ADMIN — Medication 10 ML: at 21:36

## 2019-04-27 RX ADMIN — Medication 10 ML: at 06:00

## 2019-04-27 NOTE — ROUTINE PROCESS
Bedside shift change report given to [facility] (oncoming nurse) by Sandee Moreira (offgoing nurse). Report included the following information SBAR.

## 2019-04-27 NOTE — PROGRESS NOTES
PHYSICAL THERAPY TREATMENT Patient: Emily Berry (14 y.o. female) Date: 4/27/2019 Diagnosis: Humerus distal fracture [S42.409A] <principal problem not specified> Procedure(s) (LRB): OPEN REDUCTION INTERNAL FIXATION DISTAL HUMERUS (Left) 2 Days Post-Op Precautions: Bed Alarm, Fall(NWB LUE) Chart, physical therapy assessment, plan of care and goals were reviewed. ASSESSMENT:   Pt presents supine agreeable to treatment, denying pain at rest.  [facility], CGA for transfers and ambulation of 70 feet with SPC. Pt deferred use of quad cane due difficulty managing it safely. Pt demonstrating poor technique with sit<>stand transfers with R hand on SPC instead of placed on supporting surface. Pt pleasantly resisting recommendation. Pt with improved endurance and without request for rest breaks. Gt mildly unsteady although without LOB. Pt up in bedside chair post tx, chair alarm activated. Progression toward goals: 
?    Improving appropriately and progressing toward goals ? Improving slowly and progressing toward goals ? Not making progress toward goals and plan of care will be adjusted PLAN: 
Patient continues to benefit from skilled intervention to address the above impairments. Continue treatment per established plan of care. Discharge Recommendations:  Rehab Further Equipment Recommendations for Discharge:  TBD SUBJECTIVE:  
Patient stated ? I get too close to the cane with 4 feet. ? OBJECTIVE DATA SUMMARY:  
Critical Behavior: 
Neurologic State: Alert Orientation Level: Oriented X4 Cognition: Follows commands Safety/Judgement: Awareness of environment Functional Mobility Training: 
Bed Mobility: 
  
Supine to Sit: Minimum assistance Transfers: 
Sit to Stand: Contact guard assistance Stand to Sit: Contact guard assistance Balance: 
Sitting: Intact; With support Standing: Intact; With support Standing - Static: Good Standing - Dynamic : Fair Ambulation/Gait Training: 
Distance (ft): 70 Feet (ft) Assistive Device: Gait belt;Cane, straight Ambulation - Level of Assistance: Contact guard assistance Base of Support: Widened Speed/Ewelina: Slow;Shuffled Pain: 
Pain Scale 1: Numeric (0 - 10) Pain Intensity 1: 0 Pain Location 1: Arm Pain Orientation 1: Left Pain Description 1: Aching Pain Intervention(s) 1: Medication (see MAR) Activity Tolerance:  
Good. After treatment:  
?    Patient left in no apparent distress sitting up in chair ? Patient left in no apparent distress in bed 
? Call bell left within reach ? Nursing notified ? Caregiver present 
? chair alarm activated COMMUNICATION/COLLABORATION:  
The patient?s plan of care was discussed with: Registered Nurse Levy Norwood PTA Time Calculation: 15 mins

## 2019-04-28 VITALS
RESPIRATION RATE: 15 BRPM | DIASTOLIC BLOOD PRESSURE: 66 MMHG | OXYGEN SATURATION: 97 % | BODY MASS INDEX: 27.78 KG/M2 | TEMPERATURE: 98.1 F | SYSTOLIC BLOOD PRESSURE: 163 MMHG | WEIGHT: 142.25 LBS | HEART RATE: 105 BPM

## 2019-04-28 PROCEDURE — 74011250637 HC RX REV CODE- 250/637: Performed by: ORTHOPAEDIC SURGERY

## 2019-04-28 PROCEDURE — 74011250637 HC RX REV CODE- 250/637: Performed by: PHYSICIAN ASSISTANT

## 2019-04-28 PROCEDURE — 97535 SELF CARE MNGMENT TRAINING: CPT

## 2019-04-28 RX ADMIN — SENNOSIDES, DOCUSATE SODIUM 1 TABLET: 50; 8.6 TABLET, FILM COATED ORAL at 10:09

## 2019-04-28 RX ADMIN — TRAMADOL HYDROCHLORIDE 50 MG: 50 TABLET, FILM COATED ORAL at 10:09

## 2019-04-28 RX ADMIN — ACETAMINOPHEN 1000 MG: 500 TABLET ORAL at 14:31

## 2019-04-28 RX ADMIN — ACETAMINOPHEN 1000 MG: 500 TABLET ORAL at 05:04

## 2019-04-28 RX ADMIN — TRAMADOL HYDROCHLORIDE 50 MG: 50 TABLET, FILM COATED ORAL at 05:04

## 2019-04-28 RX ADMIN — ATORVASTATIN CALCIUM 10 MG: 10 TABLET, FILM COATED ORAL at 10:09

## 2019-04-28 RX ADMIN — Medication 10 ML: at 06:24

## 2019-04-28 RX ADMIN — LISINOPRIL 20 MG: 20 TABLET ORAL at 10:09

## 2019-04-28 RX ADMIN — AMLODIPINE BESYLATE 5 MG: 5 TABLET ORAL at 10:09

## 2019-04-28 NOTE — PROGRESS NOTES
Jaycee Patricia TRANSFER - OUT REPORT: 
 
Verbal report given to Mary(name) on Adia Siddiqui  being transferred to [facility] (unit) for routine progression of care Report consisted of patients Situation, Background, Assessment and  
Recommendations(SBAR). Information from the following report(s) SBAR, Kardex and MAR was reviewed with the receiving nurse. Lines:    
 
Opportunity for questions and clarification was provided. Patient transported with: 
 Registered Nurse and  @[id number]

## 2019-04-28 NOTE — PROGRESS NOTES
Problem: Self Care Deficits Care Plan (Adult) Goal: *Acute Goals and Plan of Care (Insert Text) Description Occupational Therapy Goals Initiated 4/26/2019 1. Patient will perform lower body dressing with minimal assistance/contact guard assist within 7 day(s). 2.  Patient will perform upper body dressing with minimal assistance/contact guard assist within 7 day(s). 3.  Patient will perform grooming, standing at sink, with supervision/set-up within 7 day(s). 4.  Patient will perform toilet transfers with supervision/set-up within 7 day(s). 5.  Patient will perform all aspects of toileting with supervision/set-up within 7 day(s). Note:  
OCCUPATIONAL THERAPY TREATMENT Patient: Mckinley Min (63 y.o. female) Date: 4/28/2019 Diagnosis: Humerus distal fracture [S42.409A] <principal problem not specified> Procedure(s) (LRB): OPEN REDUCTION INTERNAL FIXATION DISTAL HUMERUS (Left) 3 Days Post-Op Precautions: Bed Alarm, Fall(NWB LUE) Chart, occupational therapy assessment, plan of care, and goals were reviewed. ASSESSMENT: 
Pt agreeable to OT. She ambulated to bathroom and transferred to commode, performed her own bladder hygiene,assist to place sanitary pad, donned pants in sitting and shirt in standing. Pt left seated in chair with pillow under L UE for comfort and postioning. Recommend SNF for rehab. Progression toward goals: 
?       Improving appropriately and progressing toward goals ? Improving slowly and progressing toward goals ? Not making progress toward goals and plan of care will be adjusted PLAN: 
Patient continues to benefit from skilled intervention to address the above impairments. Continue treatment per established plan of care. Discharge Recommendations:  SNF for rehab Further Equipment Recommendations for Discharge:  None SUBJECTIVE:  
Patient stated ? Life is challenging at 80? OBJECTIVE DATA SUMMARY:  
Cognitive/Behavioral Status: Neurologic State: Alert; Appropriate for age Orientation Level: Appropriate for age;Oriented X4 Cognition: Appropriate decision making; Appropriate for age attention/concentration; Appropriate safety awareness; Follows commands Functional Mobility and Transfers for ADLs: 
Bed Mobility: 
  
 
Transfers: 
  
Functional Transfers Toilet Transfer : Contact guard assistance Adaptive Equipment: Cane (comment);Grab bars Balance: 
 Intact sitting balance ADL Intervention: 
  
Upper Body Dressing Assistance Dressing Assistance: Minimum assistance Pullover Shirt: Minimum assistance Lower Body Dressing Assistance Dressing Assistance: Contact guard assistance Pants With Elastic Waist: Contact guard assistance Slip on Shoes with Back: Supervision/set-up Leg Crossed Method Used: No 
Position Performed: Seated edge of bed Pain: 
Pain Scale 1: Numeric (0 - 10) Pain Intensity 1: 4 Pain Location 1: Arm Pain Orientation 1: Left Pain Description 1: Aching Pain Intervention(s) 1: Medication (see MAR) Activity Tolerance: No signs/symptoms of distress or discomfort during OT Please refer to the flowsheet for vital signs taken during this treatment. After treatment:  
? Patient left in no apparent distress sitting up in chair ? Patient left in no apparent distress in bed 
? Call bell left within reach ? Nursing notified ? Caregiver present ? Chair alarm activated COMMUNICATION/COLLABORATION:  
The patient?s plan of care was discussed with: Physical Therapy Assistant, Occupational Therapist and Registered Nurse Herman Regine Hipolito, MENJIVAR/L Time Calculation: 16 mins

## 2019-04-28 NOTE — PROGRESS NOTES
Orthopaedic Progress Note Post Op day: 3 Days Post-Op 2019 4:13 PM  
 
Patient: Odilia Morillo MRN: [id number]  SSN: [id number] YOB: 1931  Age: 80 y.o. Sex: female Admit date:  2019 Date of Surgery:  2019 Procedures:  Procedure(s): OPEN REDUCTION INTERNAL FIXATION DISTAL HUMERUS Admitting Physician:  Tiffany Eason MD  
Surgeon:  Octaviano Johnson) and Role: * Jonathon Mccloud MD - Primary Consulting Physician(s): Treatment Team: Care Manager: Antoine Perez; Utilization Review: Meliza Sample SUBJECTIVE: 
  
Odilia Morillo is a 80 y.o. female is 3 Days Post-Op s/p Procedure(s): OPEN REDUCTION INTERNAL FIXATION DISTAL HUMERUS with an appropriate level of post-operative pain. No complaints of nausea, vomiting, dizziness, lightheadedness, chest pain, or shortness of breath. OBJECTIVE: 
 
  
Physical Exam: 
General: Alert, cooperative, no distress. Respiratory: Respirations unlabored Neurological:  Neurovascular exam within normal limits. Motor: + DF/PF. Musculoskeletal: Calves soft, supple, non-tender upon palpation. Dressing/Wound:  Clean, dry and intact. No significant erythema or swelling. Vital Signs:  
  
 
Patient Vitals for the past 8 hrs: 
 BP Temp Pulse Resp SpO2  
19 1047 163/66 98.1 °F (36.7 °C) (!) 105 15 97 % Temp (24hrs), Av.4 °F (36.9 °C), Min:98.1 °F (36.7 °C), Max:98.6 °F (37 °C) Labs:  
  
No results for input(s): HCT, HGB, INR, HCTEXT, HGBEXT in the last 72 hours. No lab exists for component: INREXT Lab Results Component Value Date/Time Sodium 136 2019 04:12 PM  
 Potassium 4.9 2019 04:12 PM  
 Chloride 104 2019 04:12 PM  
 CO2 27 2019 04:12 PM  
 Glucose 94 2019 04:12 PM  
 BUN 18 2019 04:12 PM  
 Creatinine 0.87 2019 04:12 PM  
 Calcium 9.3 2019 04:12 PM  
 
 
PT/OT:  
 
  
Carmen Base of Support: Widened Speed/Ewelina: Slow, Shuffled Gait Abnormalities: Antalgic, Decreased step clearance, Path deviations, Trunk sway increased Ambulation - Level of Assistance: Contact guard assistance Distance (ft): 70 Feet (ft) Assistive Device: Gait belt, Cane, straight Stairs - Level of Assistance: (NT) Patient mobility Bed Mobility Training Rolling: Moderate assistance Supine to Sit: Minimum assistance Sit to Supine: Minimum assistance Scooting: Minimum assistance Transfer Training Sit to Stand: Contact guard assistance Stand to Sit: Contact guard assistance Bed to Chair: Minimum assistance, Additional time, Assist x1 Gait Training Assistive Device: Gait belt, Cane, straight Ambulation - Level of Assistance: Contact guard assistance Distance (ft): 70 Feet (ft) Stairs - Level of Assistance: (NT)  
   
  
 
ASSESSMENT / PLAN:  
Active Problems: 
  Humerus distal fracture (4/25/2019) Pain Control: Adequate with oral agents DVT Prophylaxis:   
Therapy/ Weight bearing: NWB LUE Wound/ incisional issues: Splint in place, c, d and I Medical concerns: L arm hanging off bed, some parasthesia noted, improved with elevation and ice. Disposition: Olga Zepeda today. Signed By: 
Ida Campo PA-C Orthopedic Trauma PA [facility]

## 2019-04-30 ENCOUNTER — HOSPITAL ENCOUNTER (EMERGENCY)
Age: 84
Discharge: HOME | End: 2019-05-01
Attending: EMERGENCY MEDICINE
Payer: MEDICARE

## 2019-04-30 DIAGNOSIS — G89.18 ACUTE POST-OPERATIVE PAIN: Primary | ICD-10-CM

## 2019-04-30 DIAGNOSIS — Z47.89 CAST DISCOMFORT: ICD-10-CM

## 2019-04-30 PROCEDURE — 99285 EMERGENCY DEPT VISIT HI MDM: CPT

## 2019-04-30 NOTE — H&P
Orthopedic Admission History and Physical 
  
                                                NAME:            Vicenta Norwood :               1931 MRN:               [id number] 
  
  
Subjective:  
  
Patient is a 80 y.o. female who presents with history of L d humerus fx. Presents today for surgical treatment. 
  
    
Patient Active Problem List  
  Diagnosis Date Noted  Essential hypertension 2017  Daytime somnolence 2015  DDD (degenerative disc disease) 2015  Hyperlipidemia 2015  
  
    
Past Medical History:  
Diagnosis Date  Acute myocardial infarction of other inferior wall, episode of care unspecified    
 Allergic rhinitis, cause unspecified    
 Brachial neuritis or radiculitis NOS    
 Cancer (HCC)    
  r arm basal cell  Cervicalgia    
 Disorder of bone and cartilage, unspecified    
 GERD (gastroesophageal reflux disease)    
 Hemorrhoid    
 Hypercholesterolemia    
 Incontinence of urine    
 Insomnia, unspecified    
 Leaky heart valve    
 Osteoarthrosis, unspecified whether generalized or localized, unspecified site    
 Osteoporosis, unspecified    
 Other abnormal blood chemistry    
 Pain in joint, shoulder region    
  r shoulder, pelvic, thigh, l hip, l forearm  Unspecified essential hypertension    
 Unspecified transient cerebral ischemia    
 Unspecified vitamin D deficiency    
 UTI (urinary tract infection)    
  
     
Past Surgical History:  
Procedure Laterality Date  HX BUNIONECTOMY      
 HX CATARACT REMOVAL Bilateral    
  with L implant, R eye hard contact  HX COLONOSCOPY      
 HX LAP CHOLECYSTECTOMY     HX PARTIAL HYSTERECTOMY      
 HX TUBAL LIGATION    Prior to Admission medications Medication Sig Start Date End Date Taking? Authorizing Provider amLODIPine (NORVASC) 5 mg tablet Take 1 Tab by mouth daily. 2/15/19   Yes Efrem Maguire MD  
atorvastatin (LIPITOR) 10 mg tablet Take 1 Tab by mouth daily. 12/17/18   Yes Efrem Maguire MD  
Omeprazole delayed release (PRILOSEC D/R) 20 mg tablet Take 1 Tab by mouth daily. Patient taking differently: Take 20 mg by mouth daily as needed. 9/13/18   Yes Efrem Maguire MD  
lisinopril (PRINIVIL, ZESTRIL) 20 mg tablet Take 1 Tab by mouth daily. 8/24/18   Yes Efrem Maguire MD  
loratadine (CLARITIN) 10 mg tablet Take 10 mg by mouth daily as needed.     Yes Provider, Historical  
naproxen sodium (ALEVE) 220 mg cap Take 440 mg by mouth two (2) times a day.       Other, MD Sky  
ketoconazole (NIZORAL) 2 % topical cream Apply  to affected area daily. Patient taking differently: Apply  to affected area daily. Indications: left leg 10/25/17     Efrem Maguire MD  
VIT C/CHERRY & CELERY EX/GRP E (TART CHERRY PO) Take  by mouth.       Provider, Historical  
  
      
Current Facility-Administered Medications Medication Dose Route Frequency  lidocaine (PF) (XYLOCAINE) 10 mg/mL (1 %) injection 0.1 mL  0.1 mL SubCUTAneous PRN  
 lactated Ringers infusion  125 mL/hr IntraVENous CONTINUOUS  
 lactated ringers bolus infusion 1,000 mL  1,000 mL IntraVENous ONCE  
 naloxone (NARCAN) injection 0.2 mg  0.2 mg IntraVENous EVERY 2 MINUTES AS NEEDED  flumazenil (ROMAZICON) 0.1 mg/mL injection 0.2 mg  0.2 mg IntraVENous Multiple  lactated Ringers infusion  125 mL/hr IntraVENous CONTINUOUS  
 ceFAZolin (ANCEF) 2 g/20 mL in sterile water IV syringe  2 g IntraVENous ONCE  
  
      
Facility-Administered Medications Ordered in Other Encounters Medication Dose Route Frequency  midazolam (VERSED) injection   IntraVENous PRN  
 fentaNYL citrate (PF) injection     PRN  
 ropivacaine (PF) (NAROPIN) 5 mg/mL (0.5 %) injection   Peripheral Nerve Block PRN  
 ePHEDrine (MISTOLE) 50 mg/mL injection     PRN Allergies Allergen Reactions  Egg Diarrhea and Nausea Only  Other Plant, Animal, Environmental Runny Nose and Sneezing Social History  
  
     
Tobacco Use  Smoking status: Former Smoker  
    Packs/day: 0.50  
    Years: 50.00  
    Pack years: 25.00  
    Types: Cigarettes  
    Last attempt to quit: 3/3/1995  
    Years since quittin.1  Smokeless tobacco: Never Used Substance Use Topics  Alcohol use: Yes  
    Alcohol/week: 14.0 oz  
    Types: 28 Standard drinks or equivalent per week  
    Comment: 3 per day Family History Problem Relation Age of Onset  Cancer Other    
 Stroke Mother    
 Stroke Father    
 Heart Disease Sister    
 Heart Disease Brother    
 Stroke Brother    
  
  
Review of Systems A comprehensive review of systems was negative except for that written in the HPI. 
  
Objective:  
  
Patient Vitals for the past 8 hrs: 
  BP Temp Pulse Resp SpO2  
19 0640 114/49 98.9 °F (37.2 °C) 95 18 97 %  
  Temp (24hrs), Av.6 °F (37 °C), Min:98.2 °F (36.8 °C), Max:98.9 °F (37.2 °C) 
  
  
Physical Exam: 
General appearance: alert, cooperative, no distress, appears stated age Lungs: No use of accessory breathing muscles. Breathing unlabored. Cardiac: Regular rate. Abdomen: soft, non-tender, non-distended Extremities: As per prior exam.  
  
Labs:  
Recent Results Recent Results (from the past 24 hour(s)) METABOLIC PANEL, BASIC  
  Collection Time: 19  4:12 PM  
Result Value Ref Range  
  Sodium 136 136 - 145 mmol/L  
  Potassium 4.9 3.5 - 5.1 mmol/L  
  Chloride 104 97 - 108 mmol/L  
  CO2 27 21 - 32 mmol/L  
  Anion gap 5 5 - 15 mmol/L  
  Glucose 94 65 - 100 mg/dL  
  BUN 18 6 - 20 MG/DL  
  Creatinine 0.87 0.55 - 1.02 MG/DL  
  BUN/Creatinine ratio 21 (H) 12 - 20    
  GFR est AA >60 >60 ml/min/1.73m2  
  GFR est non-AA >60 >60 ml/min/1.73m2  
  Calcium 9.3 8.5 - 10.1 MG/DL  
EKG, 12 LEAD, INITIAL  
  Collection Time: 19  4:25 PM  
 Result Value Ref Range  
  Ventricular Rate 90 BPM  
  Atrial Rate 90 BPM  
  P-R Interval 166 ms  
  QRS Duration 74 ms  
  Q-T Interval 330 ms  
  QTC Calculation (Bezet) 403 ms  
  Calculated P Axis 44 degrees  
  Calculated R Axis -52 degrees  
  Calculated T Axis 38 degrees  
  Diagnosis      
    Normal sinus rhythm Left axis deviation Inferior infarct (cited on or before 16-JUN-2015) Anteroseptal infarct , age undetermined Abnormal ECG When compared with ECG of 23-SEP-2017 12:33, Anteroseptal infarct is now present 
   
  
  
  
Assessment: No medical contraindications to proceeding with planned surgery. Please see initial office note for full discussion of risks, benefits, and alternatives to surgery. 
  
    
Patient Active Problem List  
  Diagnosis Date Noted  Essential hypertension 09/29/2017  Daytime somnolence 05/08/2015  DDD (degenerative disc disease) 05/08/2015  Hyperlipidemia 05/08/2015  
  
  
  
Plan:  
Proceed with surgery Pt. stable Pt.  NPO x meds

## 2019-05-01 ENCOUNTER — APPOINTMENT (OUTPATIENT)
Dept: GENERAL RADIOLOGY | Age: 84
End: 2019-05-01
Attending: PHYSICIAN ASSISTANT
Payer: MEDICARE

## 2019-05-01 VITALS
TEMPERATURE: 98.5 F | HEIGHT: 60 IN | DIASTOLIC BLOOD PRESSURE: 58 MMHG | OXYGEN SATURATION: 97 % | SYSTOLIC BLOOD PRESSURE: 93 MMHG | HEART RATE: 96 BPM | WEIGHT: 147 LBS | BODY MASS INDEX: 28.86 KG/M2 | RESPIRATION RATE: 16 BRPM

## 2019-05-01 PROCEDURE — 96372 THER/PROPH/DIAG INJ SC/IM: CPT

## 2019-05-01 PROCEDURE — 73130 X-RAY EXAM OF HAND: CPT

## 2019-05-01 PROCEDURE — 74011250637 HC RX REV CODE- 250/637: Performed by: PHYSICIAN ASSISTANT

## 2019-05-01 PROCEDURE — 73070 X-RAY EXAM OF ELBOW: CPT

## 2019-05-01 PROCEDURE — 74011250636 HC RX REV CODE- 250/636

## 2019-05-01 PROCEDURE — 74011250637 HC RX REV CODE- 250/637: Performed by: EMERGENCY MEDICINE

## 2019-05-01 RX ORDER — MORPHINE SULFATE 4 MG/ML
INJECTION INTRAVENOUS
Status: DISCONTINUED
Start: 2019-05-01 | End: 2019-05-01 | Stop reason: HOSPADM

## 2019-05-01 RX ORDER — ONDANSETRON 4 MG/1
TABLET, ORALLY DISINTEGRATING ORAL
Status: DISCONTINUED
Start: 2019-05-01 | End: 2019-05-01 | Stop reason: HOSPADM

## 2019-05-01 RX ORDER — ONDANSETRON 4 MG/1
4 TABLET, ORALLY DISINTEGRATING ORAL
Status: COMPLETED | OUTPATIENT
Start: 2019-05-01 | End: 2019-05-01

## 2019-05-01 RX ORDER — MORPHINE SULFATE 4 MG/ML
4 INJECTION INTRAVENOUS
Status: COMPLETED | OUTPATIENT
Start: 2019-05-01 | End: 2019-05-01

## 2019-05-01 RX ORDER — OXYCODONE AND ACETAMINOPHEN 5; 325 MG/1; MG/1
1 TABLET ORAL ONCE
Status: COMPLETED | OUTPATIENT
Start: 2019-05-01 | End: 2019-05-01

## 2019-05-01 RX ADMIN — ONDANSETRON 4 MG: 4 TABLET, ORALLY DISINTEGRATING ORAL at 01:49

## 2019-05-01 RX ADMIN — OXYCODONE HYDROCHLORIDE AND ACETAMINOPHEN 1 TABLET: 5; 325 TABLET ORAL at 05:02

## 2019-05-01 RX ADMIN — MORPHINE SULFATE 4 MG: 4 INJECTION INTRAVENOUS at 01:49

## 2019-05-01 NOTE — ED NOTES
Discharge instruction given to Chad Salter LPN from [facility]. Verbalized understanding. No concerns voiced at this time. Pt is awaiting for ride in the room.

## 2019-05-01 NOTE — DISCHARGE INSTRUCTIONS
Patient Education        Acute Pain After Surgery: Care Instructions  Your Care Instructions    It's common to have some pain after surgery. Pain doesn't mean that something is wrong or that the surgery didn't go well. But when the pain is severe, it's important to work with your doctor to manage it. It's also important to be aware of a few facts about pain and pain medicine. · You are the only person who knows what your pain feels like. So be sure to tell your doctor when you are in pain or when the pain changes. Then he or she will know how to adjust your medicines. · Pain is often easier to control right after it starts. So it may be better to take regular doses of pain medicine and not wait until the pain gets bad. · Medicine can help control pain. But this doesn't mean you'll have no pain. Medicine works to keep the pain at a level you can live with. With time, you will feel better. Follow-up care is a key part of your treatment and safety. Be sure to make and go to all appointments, and call your doctor if you are having problems. It's also a good idea to know your test results and keep a list of the medicines you take. How can you care for yourself at home? · Be safe with medicines. Read and follow all instructions on the label. ? If the doctor gave you a prescription medicine for pain, take it as prescribed. ? If you are not taking a prescription pain medicine, ask your doctor if you can take an over-the-counter medicine. · If you take an over-the-counter pain medicine, such as acetaminophen (Tylenol), ibuprofen (Advil, Motrin), or naproxen (Aleve), read and follow all instructions on the label. · Do not take two or more pain medicines at the same time unless the doctor told you to. · Do not drink alcohol while you are taking pain medicines. · Try to walk each day if your doctor recommends it. Start by walking a little more than you did the day before. Bit by bit, increase the amount you walk. Walking increases blood flow. It also helps prevent pneumonia and constipation. · To prevent constipation from opioid pain medicines:  ? Talk to your doctor about a laxative. ? Include fruits, vegetables, beans, and whole grains in your diet each day. These foods are high in fiber. ? Drink plenty of fluids, enough so that your urine is light yellow or clear like water. Drink water, fruit juice, or other drinks that do not contain caffeine or alcohol. If you have kidney, heart, or liver disease and have to limit fluids, talk with your doctor before you increase the amount of fluids you drink. ? Take a fiber supplement, such as Citrucel or Metamucil, every day if needed. Read and follow all instructions on the label. If you take pain medicine for more than a few days, talk to your doctor before you take fiber. When should you call for help? Call your doctor now or seek immediate medical care if:    · Your pain gets worse.     · Your pain is not controlled by medicine.    Watch closely for changes in your health, and be sure to contact your doctor if you have any problems. Where can you learn more? Go to http://pierre-shannan.info/. Enter [phone] in the search box to learn more about \"Acute Pain After Surgery: Care Instructions. \"  Current as of: September 23, 2018  Content Version: 11.9  © 1438-6560 [employer]. Care instructions adapted under license by [employer] (which disclaims liability or warranty for this information). If you have questions about a medical condition or this instruction, always ask your healthcare professional. [place] [address] any warranty or liability for your use of this information.

## 2019-05-01 NOTE — ED TRIAGE NOTES
Pt arrives via EMS with report of left arm pain secondary to a GLF two weeks prior resulting in humerus fracture.

## 2019-05-01 NOTE — CONSULTS
Orthopedic History and Physical     Patient: Odilia Morillo MRN: [id number]  SSN: [id number]    YOB: 1931  Age: 80 y.o. Sex: female          Subjective:     Patient is a 80 y.o.  female who complains of left hand pain and swelling. Pt is s/p left ORIF of distal humerus on 4/25/19 by Dr. Jose Miguel Bliss. Pt was discharged to [facility]. Pt had a post op appt with occupational therapy at [facility] on Monday (2 days ago) and a custom splint for her left UE was made for patient. Pt started to have increase in swelling and significant pain tonight, therefore pt was brought in via EMS. Pt admits not to keeping her arm elevated. Pt has increase in sensation on the dorsal of her hand and feels like it is \"burned\". Pt is feeling significantly improved after her custom splint was removed and propped up one pillow and given IM pain medications since being in the ER.  Pt unable to extend her wrist.   Patient Active Problem List    Diagnosis Date Noted    Humerus distal fracture 04/25/2019    Essential hypertension 09/29/2017    Daytime somnolence 05/08/2015    DDD (degenerative disc disease) 05/08/2015    Hyperlipidemia 05/08/2015     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, episode of care unspecified     Allergic rhinitis, cause unspecified     Brachial neuritis or radiculitis NOS     Cancer ([facility] [address]     r arm basal cell    Cervicalgia     Disorder of bone and cartilage, unspecified     GERD (gastroesophageal reflux disease)     Hemorrhoid     Hypercholesterolemia     Incontinence of urine     Insomnia, unspecified     Leaky heart valve     Osteoarthrosis, unspecified whether generalized or localized, unspecified site     Osteoporosis, unspecified     Other abnormal blood chemistry     Pain in joint, shoulder region     r shoulder, pelvic, thigh, l hip, l forearm    Unspecified essential hypertension     Unspecified transient cerebral ischemia     Unspecified vitamin D deficiency     UTI (urinary tract infection)       Past Surgical History:   Procedure Laterality Date    HX BUNIONECTOMY      HX CATARACT REMOVAL Bilateral     with L implant, R eye hard contact    HX COLONOSCOPY      HX LAP CHOLECYSTECTOMY  2013    HX PARTIAL HYSTERECTOMY      HX TUBAL LIGATION  1955      Prior to Admission medications    Medication Sig Start Date End Date Taking? Authorizing Provider   oxyCODONE-acetaminophen (PERCOCET) 5-325 mg per tablet Take 1 Tab by mouth every four (4) hours as needed for Pain for up to 5 days. Max Daily Amount: 6 Tabs. 4/25/19 4/30/19  Viktor Amador MD   ondansetron (ZOFRAN ODT) 8 mg disintegrating tablet Take 1 Tab by mouth every eight (8) hours as needed for Nausea. 4/25/19   Viktor Amador MD   docusate sodium (COLACE) 50 mg capsule Take two tabs twice daily for one week, then twice a day as needed thereafter. Hold for loose stools. 4/25/19   Viktor Amador MD   amLODIPine (NORVASC) 5 mg tablet Take 1 Tab by mouth daily. 2/15/19   Asuncion Castleman, MD   atorvastatin (LIPITOR) 10 mg tablet Take 1 Tab by mouth daily. 12/17/18   Asuncion Castleman, MD   Omeprazole delayed release (PRILOSEC D/R) 20 mg tablet Take 1 Tab by mouth daily. Patient taking differently: Take 20 mg by mouth daily as needed. 9/13/18   Asuncion Castleman, MD   lisinopril (PRINIVIL, ZESTRIL) 20 mg tablet Take 1 Tab by mouth daily. 8/24/18   Asuncion Castleman, MD   ketoconazole (NIZORAL) 2 % topical cream Apply  to affected area daily. Patient taking differently: Apply  to affected area daily. Indications: left leg 10/25/17   Asuncion Castleman, MD   VIT C/CHERRY & CELERY EX/GRP E (TART CHERRY PO) Take  by mouth. Provider, Historical   loratadine (CLARITIN) 10 mg tablet Take 10 mg by mouth daily as needed.     Provider, Historical     Current Facility-Administered Medications   Medication Dose Route Frequency    morphine 4 mg/mL injection  ondansetron (ZOFRAN ODT) 4 mg tablet         Current Outpatient Medications   Medication Sig    ondansetron (ZOFRAN ODT) 8 mg disintegrating tablet Take 1 Tab by mouth every eight (8) hours as needed for Nausea.  docusate sodium (COLACE) 50 mg capsule Take two tabs twice daily for one week, then twice a day as needed thereafter. Hold for loose stools.  amLODIPine (NORVASC) 5 mg tablet Take 1 Tab by mouth daily.  atorvastatin (LIPITOR) 10 mg tablet Take 1 Tab by mouth daily.  Omeprazole delayed release (PRILOSEC D/R) 20 mg tablet Take 1 Tab by mouth daily. (Patient taking differently: Take 20 mg by mouth daily as needed.)    lisinopril (PRINIVIL, ZESTRIL) 20 mg tablet Take 1 Tab by mouth daily.  ketoconazole (NIZORAL) 2 % topical cream Apply  to affected area daily. (Patient taking differently: Apply  to affected area daily. Indications: left leg)    VIT C/CHERRY & CELERY EX/GRP E (TART CHERRY PO) Take  by mouth.  loratadine (CLARITIN) 10 mg tablet Take 10 mg by mouth daily as needed. Allergies   Allergen Reactions    Egg Diarrhea and Nausea Only    Other Plant, Animal, Environmental Runny Nose and Sneezing      Social History     Tobacco Use    Smoking status: Former Smoker     Packs/day: 0.50     Years: 50.00     Pack years: 25.00     Types: Cigarettes     Last attempt to quit: 3/3/1995     Years since quittin.1    Smokeless tobacco: Former User   Substance Use Topics    Alcohol use: Yes     Alcohol/week: 14.0 oz     Types: 28 Standard drinks or equivalent per week     Comment: 3 per day      Family History   Problem Relation Age of Onset    Cancer Other     Stroke Mother     Stroke Father     Heart Disease Sister     Heart Disease Brother     Stroke Brother         Review of Systems  A comprehensive review of systems was negative except for that written in the HPI.         Objective:     Patient Vitals for the past 1 hrs:   BP SpO2   19 0330 105/73 99 %     Temp (24hrs), Av.5 °F (36.9 °C), Min:98.5 °F (36.9 °C), Max:98.5 °F (36.9 °C)      EXAM:   Physical Exam:   Patient appears generally well, mood and affect are appropriate and pleasant. Extremities: Left UE only on one pillow wrapped in cast padding. Removed cast padding. Left hand with moderate swelling. Compartments soft. Cap refill less than 3 seconds. RP +2. No fluctuance. No pitting edema. Skin is eccymotic. Posterior elbow incision clean/ dry/ intact. No drainage. Covered with 4x4's. Able to wiggle fingers. Unable to extend wrist. Unable to move thumb. Sensation intact/ hypersensitive to dorsal aspect of hand. Vascular: 2+ dorsalis pedis pulses bilaterally. Imaging Review    INDICATION:   elbow pain     COMPARISON: 2019     FINDINGS:     2 views of the left elbow demonstrate interval open reduction internal fixation  with fixation plate along the medial distal humerus and several cortical screws  in the lateral epicondyles. Long screw in the olecranon and proximal ulna. Overlying soft tissue swelling. True lateral view could not be obtained as  difficult to evaluate for joint effusion.     IMPRESSION  IMPRESSION:  Postsurgical changes with diffuse soft tissue swelling.       Labs: No results found for this or any previous visit (from the past 12 hour(s)). Assessment:     Patient Active Problem List    Diagnosis Date Noted    Humerus distal fracture 2019    Essential hypertension 2017    Daytime somnolence 2015    DDD (degenerative disc disease) 2015    Hyperlipidemia 2015         Plan:   Left hand pain and swelling s/p left distal humerus ORIF POD 6  Post op radial nerve palsy  Left hand pain and swelling due to lack of elevation and custom splint being too tight. Pt is now significantly improved with removal of custom splint/ elevation/ pain control. Resplinted LUE with 4 in orthoglass splint/ loosely.    Pt is stable for discharge back to SNF.  Instructed patient about strict elevation (at least on 4 pillows)/ attempting to wiggle fingers. Pt to follow up with Dr. Mike Morris in the next 2-3 days for re-evaluation. NWB RUE. Sling as needed PRN. Will discuss with Dr. Mike Morris.        Bev Li NP  Orthopaedic Surgery Nurse Practitioner

## 2019-05-01 NOTE — ED PROVIDER NOTES
Caio Salcido is a 80 y.o. female  who presents by EMS to ER with c/o Patient presents with: 
Arm Pain. Patient reports falling on Easter and breaking her elbow. Patient had Surgery by Dr. Samantha Dumas on 4/25 and was discharged to [facility] for rehab. Patient was seen in the office yesterday and switched to a long arm posterior splint. Patient with increasing pain to left hand tonight. Patient took percocet prior to arrival.  
 
She specifically denies any fevers, chills, nausea, vomiting, chest pain, shortness of breath, headache, rash, diarrhea, abdominal pain, urinary/bowel changes, sweating or weight loss. PCP: Elvin Valle MD  
PMHx significant for: Past Medical History: 
No date: Acute myocardial infarction of other inferior wall, episode  
of care unspecified No date: Allergic rhinitis, cause unspecified No date: Brachial neuritis or radiculitis NOS No date: Cancer ([facility] Comment:  r arm basal cell No date: Cervicalgia No date: Disorder of bone and cartilage, unspecified No date: GERD (gastroesophageal reflux disease) No date: Hemorrhoid No date: Hypercholesterolemia No date: Incontinence of urine No date: Insomnia, unspecified No date: Leaky heart valve No date: Osteoarthrosis, unspecified whether generalized or localized, 
 unspecified site No date: Osteoporosis, unspecified No date: Other abnormal blood chemistry No date: Pain in joint, shoulder region Comment:  r shoulder, pelvic, thigh, l hip, l forearm No date: Unspecified essential hypertension No date: Unspecified transient cerebral ischemia No date: Unspecified vitamin D deficiency No date: UTI (urinary tract infection) PSHx significant for: Past Surgical History: 
No date: HX BUNIONECTOMY No date: HX CATARACT REMOVAL; Bilateral 
    Comment:  with L implant, R eye hard contact No date: HX COLONOSCOPY 
2013: HX LAP CHOLECYSTECTOMY No date: HX PARTIAL HYSTERECTOMY 
1955: HX TUBAL LIGATION 
 Social Hx: Tobacco use: Social History Tobacco Use Smoking status: Former Smoker Packs/day: 0.50 Years: 50.00 Pack years: 22 Types: Cigarettes Quit date: 3/3/1995 Years since quittin.1 Smokeless tobacco: Never Used 
; EtOH use: The patient states she drinks 0 per week.; Illicit Drug use: Allergies: 
 -- Egg -- Diarrhea and Nausea Only 
 -- Other Plant, Animal, Environmental -- Runny Nose and Sneezing There are no other complaints, changes or physical findings at this time. Past Medical History:  
Diagnosis Date  Acute myocardial infarction of other inferior wall, episode of care unspecified  Allergic rhinitis, cause unspecified  Brachial neuritis or radiculitis NOS   
 Cancer (HCC)   
 r arm basal cell  Cervicalgia  Disorder of bone and cartilage, unspecified  GERD (gastroesophageal reflux disease)  Hemorrhoid  Hypercholesterolemia  Incontinence of urine  Insomnia, unspecified  Leaky heart valve  Osteoarthrosis, unspecified whether generalized or localized, unspecified site  Osteoporosis, unspecified  Other abnormal blood chemistry  Pain in joint, shoulder region   
 r shoulder, pelvic, thigh, l hip, l forearm  Unspecified essential hypertension  Unspecified transient cerebral ischemia  Unspecified vitamin D deficiency  UTI (urinary tract infection) Past Surgical History:  
Procedure Laterality Date  HX BUNIONECTOMY  HX CATARACT REMOVAL Bilateral   
 with L implant, R eye hard contact  HX COLONOSCOPY    
 HX LAP CHOLECYSTECTOMY    HX PARTIAL HYSTERECTOMY [address] Family History:  
Problem Relation Age of Onset  Cancer Other  Stroke Mother  Stroke Father  Heart Disease Sister  Heart Disease Brother  Stroke Brother Social History Socioeconomic History  Marital status:  Spouse name: Not on file  Number of children: Not on file  Years of education: Not on file  Highest education level: Not on file Occupational History  Not on file Social Needs  Financial resource strain: Not on file  Food insecurity:  
  Worry: Not on file Inability: Not on file  Transportation needs:  
  Medical: Not on file Non-medical: Not on file Tobacco Use  Smoking status: Former Smoker Packs/day: 0.50 Years: 50.00 Pack years: 25.00 Types: Cigarettes Last attempt to quit: 3/3/1995 Years since quittin.1  Smokeless tobacco: Never Used Substance and Sexual Activity  Alcohol use: Yes Alcohol/week: 14.0 oz Types: 28 Standard drinks or equivalent per week Comment: 3 per day  Drug use: No  
 Sexual activity: Never Lifestyle  Physical activity:  
  Days per week: Not on file Minutes per session: Not on file  Stress: Not on file Relationships  Social connections:  
  Talks on phone: Not on file Gets together: Not on file Attends Christianity service: Not on file Active member of club or organization: Not on file Attends meetings of clubs or organizations: Not on file Relationship status: Not on file  Intimate partner violence:  
  Fear of current or ex partner: Not on file Emotionally abused: Not on file Physically abused: Not on file Forced sexual activity: Not on file Other Topics Concern  Not on file Social History Narrative  Not on file ALLERGIES: Egg and Other plant, animal, environmental 
 
Review of Systems Constitutional: Negative for activity change, chills and fever. HENT: Negative for congestion, rhinorrhea and sore throat. Respiratory: Negative for shortness of breath. Cardiovascular: Negative for chest pain and leg swelling. Gastrointestinal: Negative for abdominal pain, diarrhea, nausea and vomiting. Genitourinary: Negative for dysuria, vaginal bleeding and vaginal discharge. Musculoskeletal: Positive for joint swelling. Negative for arthralgias and myalgias. Skin: Positive for color change. Neurological: Negative for dizziness. Psychiatric/Behavioral: Negative for confusion. All other systems reviewed and are negative. Vitals:  
 05/01/19 0010 BP: 115/74 Pulse: 96  
Resp: 16 Temp: 98.5 °F (36.9 °C) SpO2: 97% Weight: 66.7 kg (147 lb) Height: 5' (1.524 m) Physical Exam  
Constitutional: She is oriented to person, place, and time. She appears well-developed. HENT:  
Head: Normocephalic and atraumatic. Right Ear: External ear normal.  
Left Ear: External ear normal.  
Nose: Nose normal.  
Mouth/Throat: Oropharynx is clear and moist. No oropharyngeal exudate. Eyes: Conjunctivae, EOM and lids are normal. Right eye exhibits no discharge. Left eye exhibits no discharge. Neck: Normal range of motion. No tracheal deviation present. No thyromegaly present. Cardiovascular: Normal rate, regular rhythm, normal heart sounds and intact distal pulses. Pulmonary/Chest: Effort normal and breath sounds normal.  
Abdominal: Soft. Normal appearance and bowel sounds are normal.  
Musculoskeletal: Normal range of motion. Left upper extremity is in a long arm posterior splint. LUE Extremity is NVI. Pulses 2+ distally, capillary refill <3 seconds, sensation intact. Patient with difficulty moving fingers on left hand. patient is unable to make a fist, unable to extend her wrist. Hand is warm to the touch, mild TTP over dorsal aspect. Compartments in hand and forearm are soft, ecchymosis noted. Neurological: She is alert and oriented to person, place, and time. Skin: Skin is warm and dry. Psychiatric: She has a normal mood and affect. Judgment normal.  
  
 
MDM Number of Diagnoses or Management Options Acute post-operative pain:  
Cast discomfort: Diagnosis management comments: Plan- Ortho consulted, final disposition per Dr. Shipley Expose Procedures CONSULT NOTE:  
1:35 AM 
Carlos Skelton PA-C spoke with Dr. Misha Magana NP, Specialty: ORtho 
Discussed pt's hx, disposition, and available diagnostic and imaging results. Reviewed care plans. Consultant agrees with plans as outlined. Will see for consultation.

## 2019-05-03 NOTE — DISCHARGE SUMMARY
Orthopaedic Discharge Summary    Patient: Eliud Freitas MRN: [id number]  SSN: [id number]    YOB: 1931  Age: 80 y.o. Sex: female             ADMITTING PHYSICIAN:  Emmanuel Barnett MD    ADMISSION DATE: 4/25/2019     ADMISSION DIAGNOSIS: Humerus distal fracture [S42.409A]    DISCHARGE DATE: 4/28/2019    DISCHARGE DIAGNOSIS:  Same    CONSULTS: none    PROCEDURES: Procedure(s):  OPEN REDUCTION INTERNAL FIXATION DISTAL HUMERUS    HOSPITAL COURSE: The patient was admitted to the hospital and underwent the above procedure to address admission diagnosis(es). Please refer to the admission history and physical as well as the operative note for details of the injury as well as the discussion of risks, benefits, and alternatives to the above procedure. Postoperatively, pain was initially controlled with a combination of IV and oral narcotics initially and at the time of discharge was controlled with solely oral medications. The patient received  for DVT prophylaxis postoperatively until discharge. The patient was seen and evaluated by physical therapy and deemed appropriate for discharge  To SNF. The remainder of the hospital course was unremarkable, and the patient was/will be discharged on the above date. COMPLICATIONS: None identified. DISCHARGE TO:     SNF. FOLLOWUP: 4/29/2019. DISCHARGE MEDS:   Please refer to discharge medication reconciliation. DISCHARGE INSTRUCTIONS:  Please refer to attached discharge instructions.     DISCHARGE CONDITION: Lucho Mary MD 5/3/2019 2:14 PM

## 2019-05-20 ENCOUNTER — TELEPHONE (OUTPATIENT)
Dept: FAMILY MEDICINE CLINIC | Age: 84
End: 2019-05-20

## 2019-05-20 NOTE — TELEPHONE ENCOUNTER
----- Message from Katie Alves sent at 5/20/2019  9:31 AM EDT -----  Regarding: FW: /Telephone  Contact: [phone]  Can you have Dr. Jeanene Lennox to address per Luz Goddard? Thanks.    ----- Message -----  From: Jade Medina  Sent: 5/20/2019   8:07 AM  To: [facility] Front Office  Subject: /Telephone                               Belén Olson (daughter) is requesting a call back regarding pt being released from rehab on 5/27/19 and not feeling that pt is ready to be released.

## 2019-05-21 NOTE — TELEPHONE ENCOUNTER
Tell her to ask the doc handling her care to set up home health nurse to assist with the places that she feels her mother is not able to manage  Also make an appt w me asap

## 2019-05-22 NOTE — TELEPHONE ENCOUNTER
Spoke with pt daughter Mrs. Fer Harris and advised of Dr. Marsha Bean recommendations. Verbalized understanding and no further questions.

## 2019-05-26 ENCOUNTER — HOSPITAL ENCOUNTER (EMERGENCY)
Age: 84
Discharge: HOME | End: 2019-05-26
Attending: EMERGENCY MEDICINE
Payer: MEDICARE

## 2019-05-26 VITALS
TEMPERATURE: 98.6 F | HEART RATE: 95 BPM | SYSTOLIC BLOOD PRESSURE: 125 MMHG | RESPIRATION RATE: 16 BRPM | DIASTOLIC BLOOD PRESSURE: 81 MMHG | OXYGEN SATURATION: 95 %

## 2019-05-26 DIAGNOSIS — S05.01XA ABRASION OF RIGHT CORNEA, INITIAL ENCOUNTER: Primary | ICD-10-CM

## 2019-05-26 PROCEDURE — 74011000250 HC RX REV CODE- 250: Performed by: EMERGENCY MEDICINE

## 2019-05-26 PROCEDURE — 74011250637 HC RX REV CODE- 250/637: Performed by: EMERGENCY MEDICINE

## 2019-05-26 PROCEDURE — 99283 EMERGENCY DEPT VISIT LOW MDM: CPT

## 2019-05-26 RX ORDER — MOXIFLOXACIN 5 MG/ML
1 SOLUTION/ DROPS OPHTHALMIC 3 TIMES DAILY
Qty: 3 ML | Refills: 0 | Status: SHIPPED | OUTPATIENT
Start: 2019-05-26 | End: 2019-06-02

## 2019-05-26 RX ORDER — TETRACAINE HYDROCHLORIDE 5 MG/ML
1 SOLUTION OPHTHALMIC
Status: COMPLETED | OUTPATIENT
Start: 2019-05-26 | End: 2019-05-26

## 2019-05-26 RX ORDER — ERYTHROMYCIN 5 MG/G
OINTMENT OPHTHALMIC
Status: COMPLETED | OUTPATIENT
Start: 2019-05-26 | End: 2019-05-26

## 2019-05-26 RX ADMIN — ERYTHROMYCIN: 5 OINTMENT OPHTHALMIC at 19:48

## 2019-05-26 RX ADMIN — TETRACAINE HYDROCHLORIDE 1 DROP: 5 SOLUTION OPHTHALMIC at 18:48

## 2019-05-26 RX ADMIN — FLUORESCEIN SODIUM 1 STRIP: 1 STRIP OPHTHALMIC at 18:48

## 2019-05-26 NOTE — ED PROVIDER NOTES
80 y.o. female with past medical history significant for hypercholesterolemia, MI, HTN, TIA, GERD, skin cancer, and cervicalgia who presents from home with chief complaint of eye problem. Pt complains of a hard contact being stuck in her right eye. Pt states the contact has been in her eye since yesterday morning and she has not been able to get it out. Pt notes she does not normally sleep in her contacts. Pt states the nurses at [facility] tried to get the contact out but were unsuccessful so they sent the pt to the ED for further evaluation. Pt denies eye pain. There are no other acute medical concerns at this time. Chart review: 4 days ago was seen in orthopedic office for left humeral supracondylar fracture. Admitted for the same on 4/25 for 3 days. Social hx: Former Smoker. EtOH Use. PCP: Elvin Valle MD    Note written by Joce Butcher. Shey Aquino, as dictated by Doris Olmos, DO 6:53 PM      The history is provided by the patient.         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, episode of care unspecified     Allergic rhinitis, cause unspecified     Brachial neuritis or radiculitis NOS     Cancer (HCC)     r arm basal cell    Cervicalgia     Disorder of bone and cartilage, unspecified     GERD (gastroesophageal reflux disease)     Hemorrhoid     Hypercholesterolemia     Incontinence of urine     Insomnia, unspecified     Leaky heart valve     Osteoarthrosis, unspecified whether generalized or localized, unspecified site     Osteoporosis, unspecified     Other abnormal blood chemistry     Pain in joint, shoulder region     r shoulder, pelvic, thigh, l hip, l forearm    Unspecified essential hypertension     Unspecified transient cerebral ischemia     Unspecified vitamin D deficiency     UTI (urinary tract infection)        Past Surgical History:   Procedure Laterality Date    HX BUNIONECTOMY      HX CATARACT REMOVAL Bilateral     with L implant, R eye hard contact    HX COLONOSCOPY      HX LAP CHOLECYSTECTOMY      HX PARTIAL HYSTERECTOMY      HX TUBAL LIGATION           Family History:   Problem Relation Age of Onset    Cancer Other     Stroke Mother     Stroke Father     Heart Disease Sister     Heart Disease Brother     Stroke Brother        Social History     Socioeconomic History    Marital status:      Spouse name: Not on file    Number of children: Not on file    Years of education: Not on file    Highest education level: Not on file   Occupational History    Not on file   Social Needs    Financial resource strain: Not on file    Food insecurity:     Worry: Not on file     Inability: Not on file    Transportation needs:     Medical: Not on file     Non-medical: Not on file   Tobacco Use    Smoking status: Former Smoker     Packs/day: 0.50     Years: 50.00     Pack years: 25.00     Types: Cigarettes     Last attempt to quit: 3/3/1995     Years since quittin.2    Smokeless tobacco: Former User   Substance and Sexual Activity    Alcohol use:  Yes     Alcohol/week: 14.0 oz     Types: 28 Standard drinks or equivalent per week     Comment: 3 per day    Drug use: No    Sexual activity: Never   Lifestyle    Physical activity:     Days per week: Not on file     Minutes per session: Not on file    Stress: Not on file   Relationships    Social connections:     Talks on phone: Not on file     Gets together: Not on file     Attends Zoroastrianism service: Not on file     Active member of club or organization: Not on file     Attends meetings of clubs or organizations: Not on file     Relationship status: Not on file    Intimate partner violence:     Fear of current or ex partner: Not on file     Emotionally abused: Not on file     Physically abused: Not on file     Forced sexual activity: Not on file   Other Topics Concern    Not on file   Social History Narrative    Not on file ALLERGIES: Egg and Other plant, animal, environmental    Review of Systems   Constitutional: Negative for diaphoresis and fever. Eyes: Positive for redness. Negative for pain. Respiratory: Negative for shortness of breath. Cardiovascular: Negative for chest pain. Gastrointestinal: Negative for abdominal pain, diarrhea, nausea and vomiting. Musculoskeletal: Negative for myalgias. Neurological: Negative for headaches. All other systems reviewed and are negative. Vitals:    05/26/19 1840   BP: 130/70   Pulse: 95   Resp: 17   Temp: 98.4 °F (36.9 °C)   SpO2: 95%            Physical Exam   Constitutional: She appears well-developed and well-nourished. No distress. Eyes: Lids are normal. Right conjunctiva is injected (mild). irreg pupils, post surgical bilat. Contact lens present on cornea. Mobile. Neck: No tracheal deviation present. Pulmonary/Chest: Effort normal.   Neurological: She is alert. Skin: Skin is warm and dry. She is not diaphoretic. Psychiatric: She has a normal mood and affect. Note written by Armando Wadsworth. Lupe Faria, as dictated by Adelita Long, DO 7:01 PM        MDM       Procedures               After local anesthesia, I was able to easily remove the R hard contact lens. There is a corneal abrasion on the R eye. Superficial.  See graphics.       erythro here  vigamox  F/u EYE in 2 days

## 2019-05-26 NOTE — DISCHARGE INSTRUCTIONS
Patient Education        Corneal Scratches: Care Instructions  Your Care Instructions    The cornea is the clear surface that covers the front of the eye. When a speck of dirt, a wood chip, an insect, or another object flies into your eye, it can cause a painful scratch on the cornea. Wearing contact lenses too long or rubbing your eyes can also scratch the cornea. Small scratches usually heal in a day or two. Deeper scratches may take longer. If you have had a foreign object removed from your eye or you have a corneal scratch, you will need to watch for infection and vision problems while your eye heals. Follow-up care is a key part of your treatment and safety. Be sure to make and go to all appointments, and call your doctor if you are having problems. It's also a good idea to know your test results and keep a list of the medicines you take. How can you care for yourself at home? · The doctor probably used a medicine during your exam to numb your eye. When it wears off in 30 to 60 minutes, your eye pain may come back. Take pain medicines exactly as directed. ? If the doctor gave you a prescription medicine for pain, take it as prescribed. ? If you are not taking a prescription pain medicine, ask your doctor if you can take an over-the-counter medicine. ? Do not take two or more pain medicines at the same time unless the doctor told you to. Many pain medicines have acetaminophen, which is Tylenol. Too much acetaminophen (Tylenol) can be harmful. · Do not rub your injured eye. Rubbing can make it worse. · Use the prescribed eyedrops or ointment as directed. Be sure the dropper or bottle tip is clean. To put in eyedrops or ointment:  ? Tilt your head back, and pull your lower eyelid down with one finger. ? Drop or squirt the medicine inside the lower lid. ? Close your eye for 30 to 60 seconds to let the drops or ointment move around.   ? Do not touch the ointment or dropper tip to your eyelashes or any other surface. · Do not use your contact lens in your hurt eye until your doctor says you can. Also, do not wear eye makeup until your eye has healed. · Do not drive if you have blurred vision. · Bright light may hurt. Sunglasses can help. · To prevent eye injuries in the future, wear safety glasses or goggles when you work with machines or tools, mow the lawn, or ride a bike or motorcycle. When should you call for help? Call your doctor now or seek immediate medical care if:    · You have signs of an eye infection, such as:  ? Pus or thick discharge coming from the eye.  ? Redness or swelling around the eye.  ? A fever.     · You have new or worse eye pain.     · You have vision changes.     · It feels like there is something in your eye.     · Light hurts your eye.    Watch closely for changes in your health, and be sure to contact your doctor if:    · You do not get better as expected. Where can you learn more? Go to http://pierre-shannan.info/. Enter C130 in the search box to learn more about \"Corneal Scratches: Care Instructions. \"  Current as of: July 17, 2018  Content Version: 11.9  © 3237-9648 [employer]. Care instructions adapted under license by [employer] (which disclaims liability or warranty for this information). If you have questions about a medical condition or this instruction, always ask your healthcare professional. [place] [address] any warranty or liability for your use of this information.

## 2019-05-26 NOTE — ED TRIAGE NOTES
Pt arrives via EMS from [facility] for c/o contact lens stuck in RIGHT eye. Pt reports contact has been in eye since yesterday - pt reports attempting to take contact out before bed last night and has been unable to.

## 2019-05-27 NOTE — ED NOTES
Patient has been medically cleared for discharge at this time. She does not have any family able to take the patient back to [facility]. ER arranging transportation via [place] trip. Patient escorted out of the department with a steady gait and all belongings.

## 2021-04-30 NOTE — TELEPHONE ENCOUNTER
Received VO from Dr. Becki Ingram to approve for qty of 30 Amlodipine 5mg to be sent to retail pharmacy and 90 day supply with 3 refills to be sent to mail order. Bexarotene Counseling:  I discussed with the patient the risks of bexarotene including but not limited to hair loss, dry lips/skin/eyes, liver abnormalities, hyperlipidemia, pancreatitis, depression/suicidal ideation, photosensitivity, drug rash/allergic reactions, hypothyroidism, anemia, leukopenia, infection, cataracts, and teratogenicity.  Patient understands that they will need regular blood tests to check lipid profile, liver function tests, white blood cell count, thyroid function tests and pregnancy test if applicable.
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
## 2021-09-05 ENCOUNTER — APPOINTMENT (OUTPATIENT)
Dept: CT IMAGING | Age: 86
DRG: 068 | End: 2021-09-05
Attending: EMERGENCY MEDICINE
Payer: MEDICARE

## 2021-09-05 ENCOUNTER — HOSPITAL ENCOUNTER (INPATIENT)
Age: 86
LOS: 1 days | Discharge: HOME | DRG: 068 | End: 2021-09-07
Attending: EMERGENCY MEDICINE | Admitting: STUDENT IN AN ORGANIZED HEALTH CARE EDUCATION/TRAINING PROGRAM
Payer: MEDICARE

## 2021-09-05 DIAGNOSIS — I67.2 INTRACRANIAL ATHEROSCLEROSIS: ICD-10-CM

## 2021-09-05 DIAGNOSIS — I67.9 INTRACRANIAL VASCULAR STENOSIS: ICD-10-CM

## 2021-09-05 DIAGNOSIS — G45.8 ACUTE ANTERIOR CIRCULATION TIA: ICD-10-CM

## 2021-09-05 DIAGNOSIS — R20.0 LEFT SIDED NUMBNESS: ICD-10-CM

## 2021-09-05 DIAGNOSIS — R20.0 NUMBNESS: Primary | ICD-10-CM

## 2021-09-05 DIAGNOSIS — G45.9 TIA (TRANSIENT ISCHEMIC ATTACK): ICD-10-CM

## 2021-09-05 LAB
COMMENT, HOLDF: NORMAL
GLUCOSE BLD STRIP.AUTO-MCNC: 103 MG/DL (ref 65–117)
SAMPLES BEING HELD,HOLD: NORMAL
SERVICE CMNT-IMP: NORMAL

## 2021-09-05 PROCEDURE — 51702 INSERT TEMP BLADDER CATH: CPT

## 2021-09-05 PROCEDURE — 74011000636 HC RX REV CODE- 636: Performed by: STUDENT IN AN ORGANIZED HEALTH CARE EDUCATION/TRAINING PROGRAM

## 2021-09-05 PROCEDURE — 70498 CT ANGIOGRAPHY NECK: CPT

## 2021-09-05 PROCEDURE — 84484 ASSAY OF TROPONIN QUANT: CPT

## 2021-09-05 PROCEDURE — 85610 PROTHROMBIN TIME: CPT

## 2021-09-05 PROCEDURE — 85025 COMPLETE CBC W/AUTO DIFF WBC: CPT

## 2021-09-05 PROCEDURE — 80053 COMPREHEN METABOLIC PANEL: CPT

## 2021-09-05 PROCEDURE — 70450 CT HEAD/BRAIN W/O DYE: CPT

## 2021-09-05 PROCEDURE — 99285 EMERGENCY DEPT VISIT HI MDM: CPT

## 2021-09-05 PROCEDURE — 4A03X5D MEASUREMENT OF ARTERIAL FLOW, INTRACRANIAL, EXTERNAL APPROACH: ICD-10-PCS | Performed by: STUDENT IN AN ORGANIZED HEALTH CARE EDUCATION/TRAINING PROGRAM

## 2021-09-05 PROCEDURE — 82962 GLUCOSE BLOOD TEST: CPT

## 2021-09-05 PROCEDURE — 0042T CT CODE NEURO PERF W CBF: CPT

## 2021-09-05 RX ADMIN — IOPAMIDOL 100 ML: 755 INJECTION, SOLUTION INTRAVENOUS at 23:21

## 2021-09-05 RX ADMIN — IOPAMIDOL 50 ML: 755 INJECTION, SOLUTION INTRAVENOUS at 21:55

## 2021-09-06 ENCOUNTER — APPOINTMENT (OUTPATIENT)
Dept: MRI IMAGING | Age: 86
DRG: 068 | End: 2021-09-06
Attending: STUDENT IN AN ORGANIZED HEALTH CARE EDUCATION/TRAINING PROGRAM
Payer: MEDICARE

## 2021-09-06 PROBLEM — R20.0 LEFT SIDED NUMBNESS: Status: ACTIVE | Noted: 2021-09-06

## 2021-09-06 LAB
ALBUMIN SERPL-MCNC: 3.3 G/DL (ref 3.5–5)
ALBUMIN/GLOB SERPL: 1.1 {RATIO} (ref 1.1–2.2)
ALP SERPL-CCNC: 68 U/L (ref 45–117)
ALT SERPL-CCNC: 23 U/L (ref 12–78)
AMPHET UR QL SCN: NEGATIVE
ANION GAP SERPL CALC-SCNC: 7 MMOL/L (ref 5–15)
ASPIRIN TEST, ASPIRN: 503 ARU
AST SERPL-CCNC: 23 U/L (ref 15–37)
ATRIAL RATE: 95 BPM
BARBITURATES UR QL SCN: NEGATIVE
BASOPHILS # BLD: 0.1 K/UL (ref 0–0.1)
BASOPHILS NFR BLD: 1 % (ref 0–1)
BENZODIAZ UR QL: NEGATIVE
BILIRUB SERPL-MCNC: 0.4 MG/DL (ref 0.2–1)
BUN SERPL-MCNC: 10 MG/DL (ref 6–20)
BUN/CREAT SERPL: 14 (ref 12–20)
CALCIUM SERPL-MCNC: 8.2 MG/DL (ref 8.5–10.1)
CALCULATED P AXIS, ECG09: 51 DEGREES
CALCULATED R AXIS, ECG10: -35 DEGREES
CALCULATED T AXIS, ECG11: 56 DEGREES
CANNABINOIDS UR QL SCN: NEGATIVE
CHLORIDE SERPL-SCNC: 110 MMOL/L (ref 97–108)
CHOLEST SERPL-MCNC: 169 MG/DL
CO2 SERPL-SCNC: 22 MMOL/L (ref 21–32)
COCAINE UR QL SCN: NEGATIVE
COMMENT, HOLDF: NORMAL
CREAT SERPL-MCNC: 0.69 MG/DL (ref 0.55–1.02)
DIAGNOSIS, 93000: NORMAL
DIFFERENTIAL METHOD BLD: ABNORMAL
DRUG SCRN COMMENT,DRGCM: NORMAL
EOSINOPHIL # BLD: 0.3 K/UL (ref 0–0.4)
EOSINOPHIL NFR BLD: 4 % (ref 0–7)
ERYTHROCYTE [DISTWIDTH] IN BLOOD BY AUTOMATED COUNT: 14.7 % (ref 11.5–14.5)
EST. AVERAGE GLUCOSE BLD GHB EST-MCNC: 117 MG/DL
FOLATE SERPL-MCNC: 15.8 NG/ML (ref 5–21)
GLOBULIN SER CALC-MCNC: 3 G/DL (ref 2–4)
GLUCOSE SERPL-MCNC: 94 MG/DL (ref 65–100)
HBA1C MFR BLD: 5.7 % (ref 4–5.6)
HCT VFR BLD AUTO: 39.4 % (ref 35–47)
HDLC SERPL-MCNC: 83 MG/DL
HDLC SERPL: 2 {RATIO} (ref 0–5)
HGB BLD-MCNC: 13 G/DL (ref 11.5–16)
IMM GRANULOCYTES # BLD AUTO: 0 K/UL (ref 0–0.04)
IMM GRANULOCYTES NFR BLD AUTO: 0 % (ref 0–0.5)
INR PPP: 1 (ref 0.9–1.1)
LDLC SERPL CALC-MCNC: 72.8 MG/DL (ref 0–100)
LYMPHOCYTES # BLD: 2.3 K/UL (ref 0.8–3.5)
LYMPHOCYTES NFR BLD: 31 % (ref 12–49)
MAGNESIUM SERPL-MCNC: 2.1 MG/DL (ref 1.6–2.4)
MCH RBC QN AUTO: 30.1 PG (ref 26–34)
MCHC RBC AUTO-ENTMCNC: 33 G/DL (ref 30–36.5)
MCV RBC AUTO: 91.2 FL (ref 80–99)
METHADONE UR QL: NEGATIVE
MONOCYTES # BLD: 0.6 K/UL (ref 0–1)
MONOCYTES NFR BLD: 8 % (ref 5–13)
NEUTS SEG # BLD: 4.2 K/UL (ref 1.8–8)
NEUTS SEG NFR BLD: 56 % (ref 32–75)
NRBC # BLD: 0 K/UL (ref 0–0.01)
NRBC BLD-RTO: 0 PER 100 WBC
OPIATES UR QL: NEGATIVE
P-R INTERVAL, ECG05: 180 MS
P2Y12 PLT RESPONSE,PPPR: 58 PRU (ref 194–418)
PCP UR QL: NEGATIVE
PHOSPHATE SERPL-MCNC: 3 MG/DL (ref 2.6–4.7)
PLATELET # BLD AUTO: 264 K/UL (ref 150–400)
PMV BLD AUTO: 9.8 FL (ref 8.9–12.9)
POTASSIUM SERPL-SCNC: 4.6 MMOL/L (ref 3.5–5.1)
PROT SERPL-MCNC: 6.3 G/DL (ref 6.4–8.2)
PROTHROMBIN TIME: 10 SEC (ref 9–11.1)
Q-T INTERVAL, ECG07: 362 MS
QRS DURATION, ECG06: 98 MS
QTC CALCULATION (BEZET), ECG08: 454 MS
RBC # BLD AUTO: 4.32 M/UL (ref 3.8–5.2)
SAMPLES BEING HELD,HOLD: NORMAL
SODIUM SERPL-SCNC: 139 MMOL/L (ref 136–145)
TRIGL SERPL-MCNC: 66 MG/DL (ref ?–150)
TROPONIN I SERPL-MCNC: <0.05 NG/ML
TSH SERPL DL<=0.05 MIU/L-ACNC: 1.83 UIU/ML (ref 0.36–3.74)
VENTRICULAR RATE, ECG03: 95 BPM
VIT B12 SERPL-MCNC: 260 PG/ML (ref 193–986)
VLDLC SERPL CALC-MCNC: 13.2 MG/DL
WBC # BLD AUTO: 7.4 K/UL (ref 3.6–11)

## 2021-09-06 PROCEDURE — 84100 ASSAY OF PHOSPHORUS: CPT

## 2021-09-06 PROCEDURE — 74011250636 HC RX REV CODE- 250/636: Performed by: NURSE PRACTITIONER

## 2021-09-06 PROCEDURE — 97165 OT EVAL LOW COMPLEX 30 MIN: CPT

## 2021-09-06 PROCEDURE — 84443 ASSAY THYROID STIM HORMONE: CPT

## 2021-09-06 PROCEDURE — 97530 THERAPEUTIC ACTIVITIES: CPT

## 2021-09-06 PROCEDURE — 74011250637 HC RX REV CODE- 250/637: Performed by: PSYCHIATRY & NEUROLOGY

## 2021-09-06 PROCEDURE — 97161 PT EVAL LOW COMPLEX 20 MIN: CPT

## 2021-09-06 PROCEDURE — 82746 ASSAY OF FOLIC ACID SERUM: CPT

## 2021-09-06 PROCEDURE — 80061 LIPID PANEL: CPT

## 2021-09-06 PROCEDURE — 97116 GAIT TRAINING THERAPY: CPT

## 2021-09-06 PROCEDURE — 99223 1ST HOSP IP/OBS HIGH 75: CPT | Performed by: PSYCHIATRY & NEUROLOGY

## 2021-09-06 PROCEDURE — 83735 ASSAY OF MAGNESIUM: CPT

## 2021-09-06 PROCEDURE — 93005 ELECTROCARDIOGRAM TRACING: CPT

## 2021-09-06 PROCEDURE — 80307 DRUG TEST PRSMV CHEM ANLYZR: CPT

## 2021-09-06 PROCEDURE — 36415 COLL VENOUS BLD VENIPUNCTURE: CPT

## 2021-09-06 PROCEDURE — 74011250637 HC RX REV CODE- 250/637: Performed by: STUDENT IN AN ORGANIZED HEALTH CARE EDUCATION/TRAINING PROGRAM

## 2021-09-06 PROCEDURE — 85576 BLOOD PLATELET AGGREGATION: CPT

## 2021-09-06 PROCEDURE — 82607 VITAMIN B-12: CPT

## 2021-09-06 PROCEDURE — 70551 MRI BRAIN STEM W/O DYE: CPT

## 2021-09-06 PROCEDURE — 83036 HEMOGLOBIN GLYCOSYLATED A1C: CPT

## 2021-09-06 PROCEDURE — 65660000000 HC RM CCU STEPDOWN

## 2021-09-06 RX ORDER — SODIUM CHLORIDE 9 MG/ML
75 INJECTION, SOLUTION INTRAVENOUS CONTINUOUS
Status: DISCONTINUED | OUTPATIENT
Start: 2021-09-06 | End: 2021-09-07 | Stop reason: HOSPADM

## 2021-09-06 RX ORDER — CLOPIDOGREL BISULFATE 75 MG/1
75 TABLET ORAL DAILY
Status: DISCONTINUED | OUTPATIENT
Start: 2021-09-06 | End: 2021-09-07 | Stop reason: HOSPADM

## 2021-09-06 RX ORDER — ATORVASTATIN CALCIUM 40 MG/1
40 TABLET, FILM COATED ORAL DAILY
Status: DISCONTINUED | OUTPATIENT
Start: 2021-09-06 | End: 2021-09-07 | Stop reason: HOSPADM

## 2021-09-06 RX ORDER — AMLODIPINE BESYLATE 5 MG/1
5 TABLET ORAL DAILY
Status: DISCONTINUED | OUTPATIENT
Start: 2021-09-06 | End: 2021-09-07 | Stop reason: HOSPADM

## 2021-09-06 RX ORDER — ACETAMINOPHEN 325 MG/1
650 TABLET ORAL
Status: DISCONTINUED | OUTPATIENT
Start: 2021-09-06 | End: 2021-09-07 | Stop reason: HOSPADM

## 2021-09-06 RX ORDER — ACETAMINOPHEN 650 MG/1
650 SUPPOSITORY RECTAL
Status: DISCONTINUED | OUTPATIENT
Start: 2021-09-06 | End: 2021-09-07 | Stop reason: HOSPADM

## 2021-09-06 RX ORDER — LISINOPRIL 20 MG/1
20 TABLET ORAL DAILY
Status: DISCONTINUED | OUTPATIENT
Start: 2021-09-06 | End: 2021-09-07 | Stop reason: HOSPADM

## 2021-09-06 RX ORDER — ATORVASTATIN CALCIUM 20 MG/1
10 TABLET, FILM COATED ORAL DAILY
Status: DISCONTINUED | OUTPATIENT
Start: 2021-09-06 | End: 2021-09-06

## 2021-09-06 RX ORDER — GUAIFENESIN 100 MG/5ML
81 LIQUID (ML) ORAL DAILY
Status: DISCONTINUED | OUTPATIENT
Start: 2021-09-06 | End: 2021-09-07 | Stop reason: HOSPADM

## 2021-09-06 RX ORDER — DOCUSATE SODIUM 50 MG/5ML
50 LIQUID ORAL DAILY
Status: DISCONTINUED | OUTPATIENT
Start: 2021-09-06 | End: 2021-09-07 | Stop reason: HOSPADM

## 2021-09-06 RX ORDER — HYDRALAZINE HYDROCHLORIDE 20 MG/ML
10 INJECTION INTRAMUSCULAR; INTRAVENOUS
Status: DISCONTINUED | OUTPATIENT
Start: 2021-09-06 | End: 2021-09-07 | Stop reason: HOSPADM

## 2021-09-06 RX ORDER — PANTOPRAZOLE SODIUM 20 MG/1
20 TABLET, DELAYED RELEASE ORAL
Status: DISCONTINUED | OUTPATIENT
Start: 2021-09-06 | End: 2021-09-07 | Stop reason: HOSPADM

## 2021-09-06 RX ADMIN — DOCUSATE SODIUM 50 MG: 50 LIQUID ORAL at 10:28

## 2021-09-06 RX ADMIN — ASPIRIN 81 MG: 81 TABLET, CHEWABLE ORAL at 10:28

## 2021-09-06 RX ADMIN — ATORVASTATIN CALCIUM 40 MG: 40 TABLET, FILM COATED ORAL at 10:28

## 2021-09-06 RX ADMIN — CLOPIDOGREL BISULFATE 75 MG: 75 TABLET ORAL at 10:28

## 2021-09-06 RX ADMIN — SODIUM CHLORIDE 75 ML/HR: 9 INJECTION, SOLUTION INTRAVENOUS at 12:42

## 2021-09-06 NOTE — PROGRESS NOTES
Hospitalist consulted for admission of 25-year-old patient presenting with potential CVA. Chart reviewed no initial work-up completed. Requested initial work-up be completed and hospitalist be re consulted for admission.

## 2021-09-06 NOTE — ED NOTES
Pt Arrives to the ED via [address] [facility] w/ CC of lift sided arm numbness. Pt stated that the she is having numbness on the whole left side of her body. She states that it woke her up from sleep, pt thinks she went to bed around 2000.

## 2021-09-06 NOTE — PROGRESS NOTES
Transition of Care Plan   RUR- Low  11%   DISPOSITION: Return to [address] [place] [address] apartment when stable   F/U with PCP/Specialist     Transport: [facility]    Reason for Admission:  Left-sided weakness                     RUR Score:          11%           Plan for utilizing home health:      Pending PT/OT evaluations     PCP: First and Last name:  Selena Castaneda MD     Name of Practice:    Are you a current patient: Yes/No: Yes   Approximate date of last visit: 1 month   Can you participate in a virtual visit with your PCP:                     Current Advanced Directive/Advance Care Plan: Full Code      Healthcare Decision Maker: Shree Gibbs alverto MARQUEZ is daughterGavin  Click here to [address] including selection of the Healthcare Decision Maker Relationship (ie \"Primary\")                             Transition of Care Plan:                      CM met with patient at bedside to introduce self and role. Living situation: [facility] independent living, 7th floor, uses elevator  ADLs: independent at baseline  DME: rollator, cane, shower chair  Previous IPR/SNF: [facility], not open to returning  Previous home health: Has had home health in the past, but unable to recall name of provider  Demographics: confirmed  Pharmacy:  [facility]  Main point of contact: Gvain Bellaams, [phone]    CM to follow patient progress and assist as recommended with SABIHA plan. Care Management Interventions  PCP Verified by CM: Yes  Palliative Care Criteria Met (RRAT>21 & CHF Dx)?: No  Mode of Transport at Discharge:  Other (see comment) (Gavin grimm)  Transition of Care Consult (CM Consult): Discharge Planning  MyChart Signup: No  Discharge Durable Medical Equipment: No  Health Maintenance Reviewed: Yes  Physical Therapy Consult: Yes  Occupational Therapy Consult: Yes  Speech Therapy Consult: Yes  Current Support Network: Lives Alone  Confirm Follow Up Transport: Family  Discharge Location  Discharge Placement: [facility]) [facility].

## 2021-09-06 NOTE — PROGRESS NOTES
Problem: Mobility Impaired (Adult and Pediatric)  Goal: *Acute Goals and Plan of Care (Insert Text)  Description:   FUNCTIONAL STATUS PRIOR TO ADMISSION: Patient was modified independent using a rollator and single point cane for functional mobility. HOME SUPPORT PRIOR TO ADMISSION: Patient lives in independent living at [facility]. Physical Therapy Goals  Initiated 9/6/2021  1. Patient will move from supine to sit and sit to supine  and roll side to side in bed with modified independence within 7 day(s). 2.  Patient will transfer from bed to chair and chair to bed with modified independence using the least restrictive device within 7 day(s). 3.  Patient will perform sit to stand with modified independence within 7 day(s). 4.  Patient will ambulate with modified independence for 150 feet with the least restrictive device within 7 day(s). 5.  Patient will improve Grant Balance score by 7 points within 7 days. Outcome: Progressing Towards Goal   PHYSICAL THERAPY EVALUATION- NEURO POPULATION  Patient: Jb Merlos (9 y.o. female)  Date: 9/6/2021  Primary Diagnosis: Left sided numbness [R20.0]        Precautions:   Fall      ASSESSMENT  Based on the objective data described below, the patient presents with L side numbness and altered sensation and was found to have areas of R side arterial stenosis. Her paresthesias have largely resolved, but she does still have some altered sensation in her L foot; strength is intact and symmetrical.  She was able to ambulate with cane with contact guard to min assist, with unsteadiness noted with turning. Patient reports that she does tend to be unsteady with turning, and she reports one recent near-fall into a chair. Expect that her mobility and balance will improve steadily, but she does live alone, and she will need to be safe to manage all of her ADLs and mobility prior to returning home.   Highly recommend that she be OOB in the chair for all meals and walking to the commode with nursing when needed. Current Level of Function Impacting Discharge (mobility/balance): contact guard to min assist with cane    Functional Outcome Measure: The patient scored Total: 30/56 on the Grant Balance Assessment which is indicative of moderate fall risk. Other factors to consider for discharge: lives alone     Patient will benefit from skilled therapy intervention to address the above noted impairments. PLAN :  Recommendations and Planned Interventions: gait training, therapeutic exercises, patient and family training/education, and therapeutic activities      Frequency/Duration: Patient will be followed by physical therapy:  5 times a week to address goals. Recommendation for discharge: (in order for the patient to meet his/her long term goals)  To be determined: possibly HHPT, but rehab if she does not improve to her baseline quickly    This discharge recommendation:  Has not yet been discussed the attending provider and/or case management    IF patient discharges home will need the following DME: patient owns DME required for discharge         SUBJECTIVE:   Patient stated I do things for myself.     OBJECTIVE DATA SUMMARY:   HISTORY:    Past Medical History:   Diagnosis Date    Acute myocardial infarction of other inferior wall, episode of care unspecified     Allergic rhinitis, cause unspecified     Brachial neuritis or radiculitis NOS     Cancer (HCC)     r arm basal cell    Cervicalgia     Disorder of bone and cartilage, unspecified     GERD (gastroesophageal reflux disease)     Hemorrhoid     Hypercholesterolemia     Incontinence of urine     Insomnia, unspecified     Leaky heart valve     Osteoarthrosis, unspecified whether generalized or localized, unspecified site     Osteoporosis, unspecified     Other abnormal blood chemistry     Pain in joint, shoulder region     r shoulder, pelvic, thigh, l hip, l forearm    Unspecified essential hypertension Unspecified transient cerebral ischemia     Unspecified vitamin D deficiency     UTI (urinary tract infection)      Past Surgical History:   Procedure Laterality Date    HX BUNIONECTOMY      HX CATARACT REMOVAL Bilateral     with L implant, R eye hard contact    HX COLONOSCOPY      HX LAP CHOLECYSTECTOMY  2013    HX PARTIAL HYSTERECTOMY      HX TUBAL LIGATION  1955       Personal factors and/or comorbidities impacting plan of care: as noted above    Home Situation  # Steps to Enter: 0  One/Two Story Residence: One story  Living Alone: Yes  Support Systems:  ([facility]DEP[facility]IV[facility] AT [address])  Current DME Used/Available at Home: Cane, straight, Walker, rollator    EXAMINATION/PRESENTATION/DECISION MAKING:   Critical Behavior:  Neurologic State: Alert, Eyes open spontaneously  Orientation Level: Oriented X4  Cognition: Appropriate decision making, Appropriate for age attention/concentration, Appropriate safety awareness, Follows commands     Hearing:     Skin:  intact  Edema: none  Range Of Motion:  AROM: Generally decreased, functional                       Strength:    Strength: Generally decreased, functional (symmetrical)                    Tone & Sensation:   Tone: Normal              Sensation: Intact (to light touch)               Coordination:  Coordination: Within functional limits  Vision:      Functional Mobility:  Bed Mobility:     Supine to Sit: Modified independent  Sit to Supine:  (sitting up EOB after)     Transfers:  Sit to Stand: Contact guard assistance; Adaptive equipment; Additional time  Stand to Sit: Contact guard assistance; Adaptive equipment; Additional time        Bed to Chair: Contact guard assistance; Adaptive equipment; Additional time              Balance:   Sitting: Intact; Without support  Standing: Impaired; With support (single point cane)  Standing - Static: Occasional;Fair  Standing - Dynamic : Occasional;Fair (improved with bilateral hand support)  Ambulation/Gait Training:  Distance (ft): 60 Feet (ft)  Assistive Device: Gait belt;Cane, straight  Ambulation - Level of Assistance: Contact guard assistance;Minimal assistance; Additional time; Adaptive equipment (min assist with turning due to LOB)        Gait Abnormalities: Decreased step clearance;Trunk sway increased (unsteady with turning and around obstacles)        Base of Support: Widened     Speed/Ewelina: Pace decreased (<100 feet/min)  Step Length: Right shortened;Left shortened        Interventions: Safety awareness training; Tactile cues; Verbal cues            Stairs:               Therapeutic Exercises:       Functional Measure  Grant Balance Test:    Sitting to Standin  Standing Unsupported: 3  Sitting with Back Unsupported: 4  Standing to Sitting: 3  Transfers: 3  Standing Unsupported with Eyes Closed: 3  Standing Unsupported with Feet Together: 3  Reach Forward with Outstretched Arm: 3   Object: 0  Turn to Look Over Shoulders: 4  Turn 360 Degrees: 2  Alternate Foot on Step/Stool: 0  Standing Unsupported One Foot in Front: 0  Stand on One Le  Total: 30/56         56=Maximum possible score;   0-20=High fall risk  21-40=Moderate fall risk   41-56=Low fall risk        Physical Therapy Evaluation Charge Determination   History Examination Presentation Decision-Making   HIGH Complexity :3+ comorbidities / personal factors will impact the outcome/ POC  MEDIUM Complexity : 3 Standardized tests and measures addressing body structure, function, activity limitation and / or participation in recreation  LOW Complexity : Stable, uncomplicated  LOW Complexity : FOTO score of       Based on the above components, the patient evaluation is determined to be of the following complexity level: LOW     Pain Rating:  Some pain R hip related to chronic arthritis    Activity Tolerance:   Good      After treatment patient left in no apparent distress:   Call bell within reach, Caregiver / family present, and sitting up EOB    COMMUNICATION/EDUCATION:   The patients plan of care was discussed with: Registered nurse. Patient was educated regarding her deficit(s) of L side sensory deficits and balance impairment as this relates to her diagnosis of arterial stenosis. She demonstrated Excellent understanding as evidenced by insight into deficits. BE FAST was written on patient's communication board  for visual education and reinforcement. All questions answered with patient indicating excellent understanding. Fall prevention education was provided and the patient/caregiver indicated understanding., Patient/family have participated as able in goal setting and plan of care. , and Patient/family agree to work toward stated goals and plan of care.     Thank you for this referral.  Robert Green, PT   Time Calculation: 23 mins

## 2021-09-06 NOTE — ED NOTES
Bedside and Verbal shift change report given to [address] [place] [address] [address] (oncoming nurse) by Luis Sanchez RN (offgoing nurse). Report included the following information SBAR, Kardex, ED Summary, [facility] and Recent Results.

## 2021-09-06 NOTE — PROGRESS NOTES
Problem: Falls - Risk of  Goal: *Absence of Falls  Description: Document Soren Alford Fall Risk and appropriate interventions in the flowsheet. Outcome: Progressing Towards Goal  Note: Fall Risk Interventions:  Mobility Interventions: Bed/chair exit alarm, OT consult for ADLs, Patient to call before getting OOB, PT Consult for mobility concerns, PT Consult for assist device competence, Strengthening exercises (ROM-active/passive), Utilize walker, cane, or other assistive device         Medication Interventions: Patient to call before getting OOB, Teach patient to arise slowly    Elimination Interventions: Call light in reach, Patient to call for help with toileting needs              Problem: Pain  Goal: *Control of Pain  Outcome: Progressing Towards Goal     Problem: Pressure Injury - Risk of  Goal: *Prevention of pressure injury  Description: Document Gurinder Scale and appropriate interventions in the flowsheet. Outcome: Progressing Towards Goal  Note: Pressure Injury Interventions:             Activity Interventions: Increase time out of bed, Pressure redistribution bed/mattress(bed type), PT/OT evaluation    Mobility Interventions: HOB 30 degrees or less, Pressure redistribution bed/mattress (bed type), PT/OT evaluation    Nutrition Interventions: Document food/fluid/supplement intake, Offer support with meals,snacks and hydration                     Problem: General Medical Care Plan  Goal: *Progressive mobility and function (eg: ADL's)  Outcome: Progressing Towards Goal

## 2021-09-06 NOTE — ED NOTES
TRANSFER - OUT REPORT:    Verbal report given to [facility] RN(name) on Angelina Stone  being transferred to [facility](unit) for routine progression of care       Report consisted of patients Situation, Background, Assessment and   Recommendations(SBAR). Information from the following report(s) SBAR, Kardex, ED Summary, [facility] and Recent Results was reviewed with the receiving nurse. Lines:   Peripheral IV 09/05/21 Left; Anterior; Lower Forearm (Active)   Site Assessment Clean, dry, & intact 09/05/21 2156   Phlebitis Assessment 0 09/05/21 2156   Infiltration Assessment 0 09/05/21 2156   Dressing Status Clean, dry, & intact 09/05/21 2156   Dressing Type Tape;Transparent 09/05/21 2156   Hub Color/Line Status Flushed;Patent;Pink 09/05/21 2156        Opportunity for questions and clarification was provided.       Patient transported with:   [employer]

## 2021-09-06 NOTE — PROGRESS NOTES
[facility] [facility]  Hospitalist Group                                                                                          Hospitalist Progress Note  Anirudh Fiore NP  Answering service: [phone] or [phone] from in house phone        Date of Service:  2021  NAME:  Rupert Buitrago  :  1931  MRN:  [id number]      Admission Summary:   Per H&P:   Rupert Buitrago is a 80 y.o. female with past medical history of hypertension, GERD, chronic constipation, dyslipidemia who presents to hospital with complaints of left-sided weakness. Patient states she has prior to ER arrival, her entire left side from the top of her head to the left foot was numb. She denies any associated weakness. When questioned about complete loss of sensation, she states she is unable to fully describe the sensation but felt unusual.  She has had these episodes intermittently in the soles of her feet. Reports history of TIA about 6 months ago and had normal work-up and received. She has had no recent travel or sick contacts. The patient denies any fever, chills, chest pain, cough, congestion, recent illness, palpitations, or dysuria. Vitals on ER presentation remarkable for BP of 158/75. Labs overall unremarkable and CT head and CTA head and neck showed no acute pathology. Teleneurology evaluated patient and recommended admission for MRI and EEG    Interval history / Subjective:    Seen and examined patient sitting on side of bed eating lunch. States that she is feeling \"much better\". States the numbness and tingling on left side has almost resolved. No new complaints. No overnight events noted. Echo pending. Assessment & Plan:        Left-sided numbness  -MRI brain: No acute intracranial abnormality  - LDL 72.8, continue atorvastatin   - A1c 5.7   - Continue neuro checks and obtain scans for any neuro changes   - NIS evaluated patient and signed off.  No surgical interventions   - Neurology following   - Continue asa, plavix and atorvastatin   - PT/OT following   - Case management for discharge planning        Hypertension  - Allow premissive HTN  - Continue amlodipine  And lisinopril   - Hydralazine PRN sbp >180    - Monitor vital signs per unit routine      GERD  -Home Protonix     Dyslipidemia  -Lipid panel completed   - Continue atorvastatin     Drinks 2-3 beers daily.   - Monitor for withdrawal symptoms and initiate CIWA protocol if indicated. Code status:Full   DVT prophylaxis:  SCDs    Care Plan discussed with: Patient/Family and Nurse  Anticipated Disposition: Home w/Family  Anticipated Discharge: 24 hours to 48 hours     Hospital Problems  Date Reviewed: 3/23/2019        Codes Class Noted POA    Left sided numbness ICD-10-CM: R20.0  ICD-9-CM: 782.0  9/6/2021 Unknown                Review of Systems:   A comprehensive review of systems was negative except for that written in the HPI. Vital Signs:    Last 24hrs VS reviewed since prior progress note. Most recent are:  Visit Vitals  /78 (BP 1 Location: Right upper arm, BP Patient Position: Sitting)   Pulse 100   Temp 98.4 °F (36.9 °C)   Resp 16   Ht 5' 1\" (1.549 m)   Wt 57.9 kg (127 lb 10.3 oz)   SpO2 94%   BMI 24.12 kg/m²         Intake/Output Summary (Last 24 hours) at 9/6/2021 1728  Last data filed at 9/6/2021 1000  Gross per 24 hour   Intake --   Output 1250 ml   Net -1250 ml        Physical Examination:             Constitutional:  No acute distress, cooperative, pleasant    ENT:  Oral mucosa moist, oropharynx benign. Resp:  CTA bilaterally. No wheezing/rhonchi/rales. No accessory muscle use   CV:  Regular rhythm, normal rate, no murmurs, gallops, rubs    GI:  Soft, non distended, non tender. normoactive bowel sounds, no hepatosplenomegaly     Musculoskeletal:  No edema, warm, 2+ pulses throughout    Neurologic:  Moves all extremities.   AAOx3, CN II-XII reviewed     Psych:  Not anxious or agitated       Data Review: Review and/or order of clinical lab test  Review and/or order of tests in the medicine section of [facility]      Labs:     Recent Labs     09/05/21 2200   WBC 7.4   HGB 13.0   HCT 39.4        Recent Labs     09/06/21  0500 09/05/21 2200   NA  --  139   K  --  4.6   CL  --  110*   CO2  --  22   BUN  --  10   CREA  --  0.69   GLU  --  94   CA  --  8.2*   MG 2.1  --    PHOS 3.0  --      Recent Labs     09/05/21 2200   ALT 23   AP 68   TBILI 0.4   TP 6.3*   ALB 3.3*   GLOB 3.0     Recent Labs     09/05/21 2200   INR 1.0   PTP 10.0      No results for input(s): FE, TIBC, PSAT, FERR in the last 72 hours. Lab Results   Component Value Date/Time    Folate 15.8 09/06/2021 05:00 AM      No results for input(s): PH, PCO2, PO2 in the last 72 hours.   Recent Labs     09/05/21 2200   TROIQ <0.05     Lab Results   Component Value Date/Time    Cholesterol, total 169 09/06/2021 05:00 AM    HDL Cholesterol 83 09/06/2021 05:00 AM    LDL, calculated 72.8 09/06/2021 05:00 AM    Triglyceride 66 09/06/2021 05:00 AM    CHOL/HDL Ratio 2.0 09/06/2021 05:00 AM     Lab Results   Component Value Date/Time    Glucose (POC) 103 09/05/2021 10:08 PM    Glucose (POC) 113 (H) 09/23/2017 03:32 PM    Glucose (POC) 92 09/23/2017 01:05 PM     Lab Results   Component Value Date/Time    Color YELLOW/STRAW 09/23/2017 01:22 PM    Appearance CLOUDY (A) 09/23/2017 01:22 PM    Specific gravity 1.012 09/23/2017 01:22 PM    pH (UA) 6.5 09/23/2017 01:22 PM    Protein NEGATIVE  09/23/2017 01:22 PM    Glucose NEGATIVE  09/23/2017 01:22 PM    Ketone NEGATIVE  09/23/2017 01:22 PM    Bilirubin NEGATIVE  09/23/2017 01:22 PM    Urobilinogen 0.2 09/23/2017 01:22 PM    Nitrites POSITIVE (A) 09/23/2017 01:22 PM    Leukocyte Esterase LARGE (A) 09/23/2017 01:22 PM    Epithelial cells FEW 09/23/2017 01:22 PM    Bacteria 4+ (A) 09/23/2017 01:22 PM    WBC >100 (H) 09/23/2017 01:22 PM    RBC 5-10 09/23/2017 01:22 PM         Medications Reviewed:     Current Facility-Administered Medications   Medication Dose Route Frequency    lisinopriL (PRINIVIL, ZESTRIL) tablet 20 mg  20 mg Oral DAILY    amLODIPine (NORVASC) tablet 5 mg  5 mg Oral DAILY    pantoprazole (PROTONIX) tablet 20 mg  20 mg Oral ACB    docusate (COLACE) 50 mg/5 mL oral liquid 50 mg  50 mg Oral DAILY    acetaminophen (TYLENOL) tablet 650 mg  650 mg Oral Q4H PRN    Or    acetaminophen (TYLENOL) solution 650 mg  650 mg Per NG tube Q4H PRN    Or    acetaminophen (TYLENOL) suppository 650 mg  650 mg Rectal Q4H PRN    aspirin chewable tablet 81 mg  81 mg Oral DAILY    clopidogreL (PLAVIX) tablet 75 mg  75 mg Oral DAILY    atorvastatin (LIPITOR) tablet 40 mg  40 mg Oral DAILY    0.9% sodium chloride infusion  75 mL/hr IntraVENous CONTINUOUS     ______________________________________________________________________  EXPECTED LENGTH OF STAY: - - -  ACTUAL LENGTH OF STAY:          0                 Bill De La Vega NP

## 2021-09-06 NOTE — PROGRESS NOTES
Aspirin test therapeutic at 503 and P2Y12 therapeutic at 58. NIS signed off. Medical management per Neurology. Discussed with Dr. Amy Sarah with Neurology.

## 2021-09-06 NOTE — PROGRESS NOTES
Please note that this dictation was completed with [employer], the computer voice recognition software. Quite often unanticipated grammatical, syntax, homophones, and other interpretive errors are inadvertently transcribed by the computer software. Please excuse any errors that have escaped final proofreading. NIHSS Code Stroke Documentation      Person Administering Scale: Kamila Arguello NP    TIME: 20:52    LKW: Unknown    PMH: Hypertension, GERD, hypercholesterolemia, right arm basal cell cancer, MI, and s/p left ORIF of distal humerus on 4/25/19 with  LUE distal numbness    SUBJECTIVE: Left-sided numbness    66-year-old female with above-mentioned past medical history who presented to the emergency department with left-sided numbness that started around 9:52 PM.  The patient is unsure last known well however she claims she went to bed daylight with no symptoms however, she woke up around 20:00ish PM and started endorsing left-sided numbness therefore presented to the ED for further evaluation. Patient denied any headache, visual changes, dysphagia, aphasia, imbalance, focal weaknesses except for left upper extremity hand  weakness from previous left elbow injury. The patient was evaluated by ED doctor, and tele neurologist Dr. Marina Kohli. The patient was noted to have NIH score 0 objectively with subjective numbness therefore patient was not deemed a tPA candidate. CT head without contrast showed no acute intracranial abnormalities. The patient will be taken back for CTA head and neck, MRI head without contrast and further stroke work to follow-up. The patient takes 81 mg aspirin at home daily. 1a  Level of consciousness: 0=alert; keenly responsive   1b. LOC questions:  0=Answers both questions correctly   1c. LOC commands: 0=Performs both tasks correctly   2. Best Gaze: 0=normal   3. Visual: 0=No visual loss   4. Facial Palsy: 0=Normal symmetric movement   5a.  Motor left arm: 0=No drift, arm holds 90 (or 45) degrees for full 10 seconds   5b. Motor right arm: 0=No drift, arm holds 90 (or 45) degrees for full 10 seconds   6a. Motor left le=No drift; leg holds 30-degree position for full 5 seconds. 6b  Motor right le=No drift; leg holds 30-degree position for full 5 seconds. 7. Limb Ataxia: 0=Absent   8. Sensory: 0=Normal; no sensory loss   9. Best Language:  0=No aphasia, normal   10. Dysarthria: 0=Normal   11. Extinction and Inattention: 0=No abnormality    Total:    0     VAN: NEGATIVE    TPA Candidate: NO    Mechanical Thrombectomy Candidate: NO    ANTIPLT/AC/ANTITHROMB: Aspirin 81 mg daily    CT Results (most recent):  Results from Hospital Encounter encounter on 21    CT CODE NEURO HEAD WO CONTRAST    Narrative  EXAM: CT CODE NEURO HEAD WO CONTRAST    INDICATION: Code Stroke    COMPARISON: CT head 2017. TECHNIQUE: Unenhanced CT of the head was performed using 5 mm images. Brain and  bone windows were generated. CT dose reduction was achieved through use of a  standardized protocol tailored for this examination and automatic exposure  control for dose modulation. FINDINGS:  Mild generalized volume loss. Scattered white matter hypodensities may reflect  chronic microangiopathic change. . There is no intracranial hemorrhage,  extra-axial collection, mass, mass effect or midline shift. The basilar  cisterns are open. No acute infarct is identified. The bone windows demonstrate  no abnormalities. The visualized portions of the paranasal sinuses and mastoid  air cells are clear. Bilateral lens replacements. Impression  No acute intracranial abnormality on noncontrast head CT    CTA head and neck pending; will await results    Discussed with: ED nurse, ED physician Dr. Sandro Hoffman, and tele-neurologist Dr. Angelica Amin    Time spent: 35 minutes.      Tanner Farias NP  Neurocritical Care Nurse Practitioner  [phone]

## 2021-09-06 NOTE — ED PROVIDER NOTES
24-year-old female with a history of CAD, hypercholesterolemia presents with a chief complaint of numbness. Patient states that she woke up from sleep approximately 1 hour ago with left-sided numbness mostly in the hand. This numbness now involves the entire left side of her body including her face, entire arm and leg. She denies any back pain or headache.            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, episode of care unspecified     Allergic rhinitis, cause unspecified     Brachial neuritis or radiculitis NOS     Cancer (HCC)     r arm basal cell    Cervicalgia     Disorder of bone and cartilage, unspecified     GERD (gastroesophageal reflux disease)     Hemorrhoid     Hypercholesterolemia     Incontinence of urine     Insomnia, unspecified     Leaky heart valve     Osteoarthrosis, unspecified whether generalized or localized, unspecified site     Osteoporosis, unspecified     Other abnormal blood chemistry     Pain in joint, shoulder region     r shoulder, pelvic, thigh, l hip, l forearm    Unspecified essential hypertension     Unspecified transient cerebral ischemia     Unspecified vitamin D deficiency     UTI (urinary tract infection)        Past Surgical History:   Procedure Laterality Date    HX BUNIONECTOMY      HX CATARACT REMOVAL Bilateral     with L implant, R eye hard contact    HX COLONOSCOPY      HX LAP CHOLECYSTECTOMY  2013    HX PARTIAL HYSTERECTOMY      HX TUBAL LIGATION  1955         Family History:   Problem Relation Age of Onset    Cancer Other     Stroke Mother     Stroke Father     Heart Disease Sister     Heart Disease Brother     Stroke Brother        Social History     Socioeconomic History    Marital status:      Spouse name: Not on file    Number of children: Not on file    Years of education: Not on file    Highest education level: Not on file   Occupational History    Not on file Tobacco Use    Smoking status: Former Smoker     Packs/day: 0.50     Years: 50.00     Pack years: 25.00     Types: Cigarettes     Quit date: 3/3/1995     Years since quittin.5    Smokeless tobacco: Former User   Substance and Sexual Activity    Alcohol use: Yes     Alcohol/week: 23.3 standard drinks     Types: 28 Standard drinks or equivalent per week     Comment: 3 per day    Drug use: No    Sexual activity: Never   Other Topics Concern    Not on file   Social History Narrative    Not on file     Social Determinants of Health     Financial Resource Strain:     Difficulty of Paying Living Expenses:    Food Insecurity:     Worried About Running Out of Food in the Last Year:     Ran Out of Food in the Last Year:    Transportation Needs:     Lack of Transportation (Medical):  Lack of Transportation (Non-Medical):    Physical Activity:     Days of Exercise per Week:     Minutes of Exercise per Session:    Stress:     Feeling of Stress :    Social Connections:     Frequency of Communication with Friends and Family:     Frequency of Social Gatherings with Friends and Family:     Attends Scientologist Services:     Active Member of Clubs or Organizations:     Attends Club or Organization Meetings:     Marital Status:    Intimate Partner Violence:     Fear of Current or Ex-Partner:     Emotionally Abused:     Physically Abused:     Sexually Abused: ALLERGIES: Egg and Other plant, animal, environmental    Review of Systems   Constitutional: Negative for fever. HENT: Negative for rhinorrhea. Respiratory: Negative for shortness of breath. Cardiovascular: Negative for chest pain. Gastrointestinal: Negative for abdominal pain. Genitourinary: Negative for dysuria. Musculoskeletal: Negative for back pain. Skin: Negative for wound. Neurological: Positive for numbness. Negative for headaches. Psychiatric/Behavioral: Negative for confusion.        There were no vitals filed for this visit. Physical Exam  Vitals and nursing note reviewed. Constitutional:       General: She is not in acute distress. Appearance: Normal appearance. She is not ill-appearing, toxic-appearing or diaphoretic. HENT:      Head: Normocephalic and atraumatic. Eyes:      Extraocular Movements: Extraocular movements intact. Cardiovascular:      Rate and Rhythm: Normal rate. Pulses: Normal pulses. Heart sounds: Normal heart sounds. Pulmonary:      Effort: Pulmonary effort is normal. No respiratory distress. Breath sounds: Normal breath sounds. Abdominal:      General: There is no distension. Musculoskeletal:         General: Normal range of motion. Cervical back: Normal range of motion. Skin:     General: Skin is dry. Neurological:      Mental Status: She is alert and oriented to person, place, and time. Cranial Nerves: No cranial nerve deficit. Sensory: Sensory deficit present. Motor: No weakness. Gait: Gait normal.   Psychiatric:         Mood and Affect: Mood normal.          MDM  Number of Diagnoses or Management Options  Numbness  Diagnosis management comments: 79-year-old female presents with left-sided numbness in the face, upper and lower extremity. Patient states that she woke up about an hour prior to arrival with numbness in the hand which spread. Technically she is a wake-up stroke with an unknown onset time. A level 1 stroke was activated however as her symptoms progressed approximately 1 hour ago. Noncontrast CT of the head is unremarkable. Patient's initial NIH is a 2 for numbness with no other focal deficit. Patient is not a TPA candidate for multiple reasons including time of onset of symptoms and low NIH.  CTA and CT perfusion will be obtained along with labs. Teleneurology evaluated the patient and recommended admission for MRI.   Teleneurology felt that the patient could be suffering from focal seizure but recommended holding off with empiric treatment. EEG was also recommended. Patient will be admitted to hospital medicine for further work-up and management. Total critical care time spent exclusive of procedures:  39 minutes    Perfect Serve Consult for Admission  10:47 PM    ED Room Number: ER13/13  Patient Name and age:  Rosy Soto 80 y.o.  female  Working Diagnosis: Numbness  (primary encounter diagnosis)    COVID-19 Suspicion:  no  Sepsis present:  no  Reassessment needed: no  Code Status:  Full Code  Readmission: no  Isolation Requirements:  no  Recommended Level of Care:  telemetry  Department:[facility] ED - 21   Other:  No tpa. Teleneuro recommended MRI with and without contrast and EEG.          Amount and/or Complexity of Data Reviewed  Clinical lab tests: ordered and reviewed  Tests in the radiology section of CPT®: ordered and reviewed           Procedures

## 2021-09-06 NOTE — ED TRIAGE NOTES
Patient arrives after waking up with left sided weakness. It is reported that the patient went to bed around 1930, and woke up about 2057 with the left sided weakness. Level 1 stroke called and the patient was taken to the CT scanner.

## 2021-09-06 NOTE — CONSULTS
Consult received. CTA and MRI reviewed. No acute stroke. Reported TIA symptoms (left arm and leg) are probably referable to severe right YOBANY ICAD with segmental stenoses of the A2 and callosomarginal segments. Unfortunately, these lesions are much too distal for stenting. 60% stenosis of the cervical right vertebral artery at C6-7 is unlikely symptomatic given the degree of stenosis. Note the left vertebral artery shows calcified plaque just distal to the origin within an S -shaped loop. Stenosis is difficult to estimate at this location. Optimize medical therapy. Agree with DAPT unless fall risks outweigh benefits. I would avoid SBP < 120. I examined the patient and discuss findings with her and her daughter at bedside. I provided stroke education personally and advised the patient to call [phone] if she experiences any stroke symptoms in the future. Sign off. We are immediately available for changes. [phone]      RIGHT VERTEBRAL ARTERY  RIGHT ANTERIOR CEREBRAL ARTERY        Objective:     Patient Vitals for the past 24 hrs:   Temp Pulse Resp BP SpO2   09/06/21 1246 -- (!) 101 -- (!) 171/66 --   09/06/21 1029 98.6 °F (37 °C) 95 22 (!) 157/64 --   09/06/21 0938 98.6 °F (37 °C) 92 13 (!) 180/92 --   09/06/21 0700 -- 76 -- (!) 171/74 --   09/06/21 0500 -- 81 15 (!) 123/51 94 %   09/06/21 0430 -- 96 16 (!) 153/64 95 %   09/06/21 0330 -- 94 24 (!) 137/44 95 %   09/06/21 0300 -- 100 17 137/68 --   09/06/21 0230 -- (!) 102 18 (!) 173/71 --   09/06/21 0215 -- 88 18 (!) 122/51 94 %   09/06/21 0115 -- 92 19 (!) 122/58 93 %   09/06/21 0100 -- 95 19 (!) 85/73 92 %   09/06/21 0045 -- 95 21 (!) 125/53 92 %   09/06/21 0000 -- 100 16 (!) 109/93 95 %   09/05/21 2153 98.4 °F (36.9 °C) 76 18 (!) 158/75 98 %        Intake and Output:  Current Shift: 09/06 0701 - 09/06 1900  In: -   Out: 1250 [Urine:1250]  Last three shifts: No intake/output data recorded. Neurological Exam:   AF VSS; NAD.    A&O x 4, fluent speech and appropriate affect. Very quick, sharp answers. No facial droop. CNII-XII grossly intact. EOMI. PERRLA. ROM in bilateral shoulders limited by OA. Normal strength and sensation throughout. No pronator drift. Lab/Data Review: All lab results for the last 24 hours reviewed.      Imaging Reviewed:

## 2021-09-06 NOTE — CONSULTS
Neurointerventional Surgery Consult  [facility]  Neurocritical Care NP      Patient: Mert Boo MRN: [id number]  SSN: [id number]    YOB: 1931  Age: 80 y.o. Sex: female        Chief Complaint: left-sided numbness     Subjective:      Mert Boo is a 80 y.o. right-handed female with a past medical history of MI, allergic rhinitis, incontinence, carpal tunnel syndrome, right arm basal cell cancer, GERD, hypercholesterolemia, arthritis, osteoporosis, hypertension, vitamin D deficiency, and TIA who presented to the ER last night with complaints of left-sided numbness. The patient reports that she went to bed early yesterday afternoon, but was unable to give me a specific time. She woke up last night and noticed that she was experiencing left-sided numbness from \"head to toe. \" She called her daughter and her daughter told her to call 911. She denies any other focal neurological symptoms. She presented to the ED via EMS as a stroke alert. CT of the head without contrast showed no acute intracranial abnormalities. NIH stroke scale was 0 upon evaluation in the ED and she was deemed not a candidate for tPA. CTA of the head and neck was completed showing no large vessel arterial occlusion in the head or neck. There was focal severe stenosis of the lower right cervical vertebral artery seen. Multifocal stenosis of the right YOBANY. CT perfusion showed no perfusion abnormality. MRI of Brain was negative for acute stroke. Mild white matter disease was seen which may reflect chronic microangiopathic change. She takes 81 mg of aspirin daily at home. Neurointerventional Surgery is consulted for further evaluation due to abnormal CTA findings. She reports that the left-sided numbness has completely resolved.   She currently denies any headache, vision changes, chest pain, shortness of breath, weakness, dizziness, tingling, nausea, vomiting, speech difficulty, balance, or coordination issues. She reports that she does have some chronic numbness in her hands at baseline and will have some difficulty picking objects up due to this. She also reports that she constantly feels something on her fingertips on the left hand, excluding the pinky finger, but she realizes it is nothing there. She does not describe it as numbness, but reported a \"rubbery\" feeling. She reports that she notices it mostly when picking up objects and it has been ongoing for sometime, but she was not able to tell me specifically how long. She also reported that she also felt \"something foreign\" on the lateral aspect of her left foot on the the arch yesterday with her symptoms, but when she looked nothing was there. She reported that this was new.       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, episode of care unspecified     Allergic rhinitis, cause unspecified     Brachial neuritis or radiculitis NOS     Cancer (HCC)     r arm basal cell    Cervicalgia     Disorder of bone and cartilage, unspecified     GERD (gastroesophageal reflux disease)     Hemorrhoid     Hypercholesterolemia     Incontinence of urine     Insomnia, unspecified     Leaky heart valve     Osteoarthrosis, unspecified whether generalized or localized, unspecified site     Osteoporosis, unspecified     Other abnormal blood chemistry     Pain in joint, shoulder region     r shoulder, pelvic, thigh, l hip, l forearm    Unspecified essential hypertension     Unspecified transient cerebral ischemia     Unspecified vitamin D deficiency     UTI (urinary tract infection)      Past Surgical History:   Procedure Laterality Date    HX BUNIONECTOMY      HX CATARACT REMOVAL Bilateral     with L implant, R eye hard contact    HX COLONOSCOPY      HX LAP CHOLECYSTECTOMY  2013    HX PARTIAL HYSTERECTOMY      HX TUBAL LIGATION  1955    Aortic valve replacement in October 2020  Family History   Problem Relation Age of Onset    Cancer Other     Stroke Mother     Stroke Father     Heart Disease Sister     Heart Disease Brother     Stroke Brother    She reports both parents  from a stroke   Left arm surgery from fracture   Social History     Tobacco Use    Smoking status: Former Smoker     Packs/day: 0.50     Years: 50.00     Pack years: 25.00     Types: Cigarettes     Quit date: 3/3/1995     Years since quittin.5    Smokeless tobacco: Former User   Substance Use Topics    Alcohol use:  Yes     Alcohol/week: Drinks about 2-3 cans of beer daily      Types:      Comment: 3 per day      Current Facility-Administered Medications   Medication Dose Route Frequency Provider Last Rate Last Admin    lisinopriL (PRINIVIL, ZESTRIL) tablet 20 mg  20 mg Oral DAILY Ema Jimenez MD        amLODIPine (NORVASC) tablet 5 mg  5 mg Oral DAILY Ema Jimenez MD        pantoprazole (PROTONIX) tablet 20 mg  20 mg Oral ACB Ema Jimenez MD        docusate (COLACE) 50 mg/5 mL oral liquid 50 mg  50 mg Oral DAILY Ema Jimenez MD   50 mg at 21 1028    acetaminophen (TYLENOL) tablet 650 mg  650 mg Oral Q4H PRN Ema Jimenez MD        Or    acetaminophen (TYLENOL) solution 650 mg  650 mg Per NG tube Q4H PRN Ema Jimenez MD        Or    acetaminophen (TYLENOL) suppository 650 mg  650 mg Rectal Q4H PRN Ema Jimenez MD        aspirin chewable tablet 81 mg  81 mg Oral DAILY Neil Castellano K, DO   81 mg at 21 1028    clopidogreL (PLAVIX) tablet 75 mg  75 mg Oral DAILY Fe Castellanoakunya K, DO   75 mg at 21 1028    atorvastatin (LIPITOR) tablet 40 mg  40 mg Oral DAILY Jose Carlos Castellanounya K, DO   40 mg at 21 1028    0.9% sodium chloride infusion  75 mL/hr IntraVENous CONTINUOUS Nadya Carpenter NP            Allergies   Allergen Reactions    Egg Diarrhea and Nausea Only    Other Plant, Animal, Environmental Runny Nose and Sneezing Review of Systems:  Pertinent items are noted in the History of Present Illness. Objective:     Vitals:    09/06/21 0500 09/06/21 0700 09/06/21 0938 09/06/21 1029   BP: (!) 123/51 (!) 171/74 (!) 180/92 (!) 157/64   Pulse: 81 76 92 95   Resp: 15  13 22   Temp:   98.6 °F (37 °C) 98.6 °F (37 °C)   SpO2: 94%      Weight:       Height:   5' 1\" (1.549 m)         Physical Exam:  GENERAL: alert, cooperative, no distress, appears stated age  LUNG: clear to auscultation bilaterally  HEART: regular rate and rhythm, S1, S2 normal, no murmur, click, rub or gallop  EXTREMITIES:  extremities normal, atraumatic, no cyanosis or edema  SKIN: Appropriate for ethnicity. Skin semi- warm to touch. Neurologic Exam:  Mental Status:  Alert and oriented x 4. Appropriate affect, mood and behavior. Language:    Normal fluency, repetition, comprehension and naming. Cranial Nerves:         Patient has implants in both eyes. Pupils irregularly shaped bilaterally. Left pupil 2 mm and right pupil 3 mm, reactive to light bilaterally. Patient has history of bilateral cataract surgery and suspect its related to eye surgery in past     Visual fields full to confrontation. Extraocular movements intact. Facial sensation intact. Full facial strength, no asymmetry. No dysarthria. Tongue protrudes to midline, palate elevates symmetrically. Shoulder shrug 5/5 bilaterally. Motor:    No pronator drift. Bulk and tone normal.     LUE 4+/5 strength which is likely secondary to chronic left elbow pain from history of fracture preventing good testing, RLE 4+/5 which is likely secondary to chronic right hip pain from arthritis preventing good testing. RUE 5/5 strength. LLE 5/5 strength. Bilateral hand  are equal in strength. No involuntary movements. Sensation:    Sensation intact throughout to light touch and pinprick. No neglect.        Coordination & Gait: No ataxia with FTN and HTS bilaterally. Gait deferred. NIHSS:      1a-LOC:0    1b-Month/Age:0    1c-Open/Close Hand:0    2-Best Gaze:0    3-Visual Fields:0    4-Facial Palsy:0    5a-Left Arm:0    5b-Right Arm:0    6a-Left Le    6b-Right Le    7-Limb Ataxia:0    8-Sensory:0    9-Best Language:0    10-Dysarthria:0    11-Extinction/Inattention:0  TOTAL SCORE:0      Recent Results (from the past 24 hour(s))   CBC WITH AUTOMATED DIFF    Collection Time: 21 10:00 PM   Result Value Ref Range    WBC 7.4 3.6 - 11.0 K/uL    RBC 4.32 3.80 - 5.20 M/uL    HGB 13.0 11.5 - 16.0 g/dL    HCT 39.4 35.0 - 47.0 %    MCV 91.2 80.0 - 99.0 FL    MCH 30.1 26.0 - 34.0 PG    MCHC 33.0 30.0 - 36.5 g/dL    RDW 14.7 (H) 11.5 - 14.5 %    PLATELET 658 260 - 535 K/uL    MPV 9.8 8.9 - 12.9 FL    NRBC 0.0 0  WBC    ABSOLUTE NRBC 0.00 0.00 - 0.01 K/uL    NEUTROPHILS 56 32 - 75 %    LYMPHOCYTES 31 12 - 49 %    MONOCYTES 8 5 - 13 %    EOSINOPHILS 4 0 - 7 %    BASOPHILS 1 0 - 1 %    IMMATURE GRANULOCYTES 0 0.0 - 0.5 %    ABS. NEUTROPHILS 4.2 1.8 - 8.0 K/UL    ABS. LYMPHOCYTES 2.3 0.8 - 3.5 K/UL    ABS. MONOCYTES 0.6 0.0 - 1.0 K/UL    ABS. EOSINOPHILS 0.3 0.0 - 0.4 K/UL    ABS. BASOPHILS 0.1 0.0 - 0.1 K/UL    ABS. IMM. GRANS. 0.0 0.00 - 0.04 K/UL    DF AUTOMATED     METABOLIC PANEL, COMPREHENSIVE    Collection Time: 21 10:00 PM   Result Value Ref Range    Sodium 139 136 - 145 mmol/L    Potassium 4.6 3.5 - 5.1 mmol/L    Chloride 110 (H) 97 - 108 mmol/L    CO2 22 21 - 32 mmol/L    Anion gap 7 5 - 15 mmol/L    Glucose 94 65 - 100 mg/dL    BUN 10 6 - 20 MG/DL    Creatinine 0.69 0.55 - 1.02 MG/DL    BUN/Creatinine ratio 14 12 - 20      GFR est AA >60 >60 ml/min/1.73m2    GFR est non-AA >60 >60 ml/min/1.73m2    Calcium 8.2 (L) 8.5 - 10.1 MG/DL    Bilirubin, total 0.4 0.2 - 1.0 MG/DL    ALT (SGPT) 23 12 - 78 U/L    AST (SGOT) 23 15 - 37 U/L    Alk.  phosphatase 68 45 - 117 U/L    Protein, total 6.3 (L) 6.4 - 8.2 g/dL    Albumin 3.3 (L) 3.5 - 5.0 g/dL Globulin 3.0 2.0 - 4.0 g/dL    A-G Ratio 1.1 1.1 - 2.2     PROTHROMBIN TIME + INR    Collection Time: 09/05/21 10:00 PM   Result Value Ref Range    INR 1.0 0.9 - 1.1      Prothrombin time 10.0 9.0 - 11.1 sec   TROPONIN I    Collection Time: 09/05/21 10:00 PM   Result Value Ref Range    Troponin-I, Qt. <0.05 <0.05 ng/mL   SAMPLES BEING HELD    Collection Time: 09/05/21 10:00 PM   Result Value Ref Range    SAMPLES BEING HELD 1RED,1SST,1LAV,1BLU     COMMENT        Add-on orders for these samples will be processed based on acceptable specimen integrity and analyte stability, which may vary by analyte.    GLUCOSE, POC    Collection Time: 09/05/21 10:08 PM   Result Value Ref Range    Glucose (POC) 103 65 - 117 mg/dL    Performed by Melida Aguilar    EKG, 12 LEAD, INITIAL    Collection Time: 09/06/21 12:17 AM   Result Value Ref Range    Ventricular Rate 95 BPM    Atrial Rate 95 BPM    P-R Interval 180 ms    QRS Duration 98 ms    Q-T Interval 362 ms    QTC Calculation (Bezet) 454 ms    Calculated P Axis 51 degrees    Calculated R Axis -35 degrees    Calculated T Axis 56 degrees    Diagnosis       Normal sinus rhythm  Left axis deviation  Minimal voltage criteria for LVH, may be normal variant ( David product )  Inferior infarct (cited on or before 16-JUN-2015)  Anterolateral infarct (cited on or before 09-NOV-2005)  Abnormal ECG  When compared with ECG of 24-APR-2019 16:25,  QRS duration has increased  Questionable change in initial forces of Lateral leads  QT has lengthened     DRUG SCREEN, URINE    Collection Time: 09/06/21  5:00 AM   Result Value Ref Range    AMPHETAMINES Negative NEG      BARBITURATES Negative NEG      BENZODIAZEPINES Negative NEG      COCAINE Negative NEG      METHADONE Negative NEG      OPIATES Negative NEG      PCP(PHENCYCLIDINE) Negative NEG      THC (TH-CANNABINOL) Negative NEG      Drug screen comment (NOTE)    FOLATE    Collection Time: 09/06/21  5:00 AM   Result Value Ref Range    Folate 15.8 5.0 - 21.0 ng/mL   MAGNESIUM    Collection Time: 09/06/21  5:00 AM   Result Value Ref Range    Magnesium 2.1 1.6 - 2.4 mg/dL   PHOSPHORUS    Collection Time: 09/06/21  5:00 AM   Result Value Ref Range    Phosphorus 3.0 2.6 - 4.7 MG/DL   SAMPLES BEING HELD    Collection Time: 09/06/21  5:00 AM   Result Value Ref Range    SAMPLES BEING HELD 1red,1lav,1blue,uc hold     COMMENT        Add-on orders for these samples will be processed based on acceptable specimen integrity and analyte stability, which may vary by analyte. HEMOGLOBIN A1C WITH EAG    Collection Time: 09/06/21  5:00 AM   Result Value Ref Range    Hemoglobin A1c 5.7 (H) 4.0 - 5.6 %    Est. average glucose 117 mg/dL   TSH 3RD GENERATION    Collection Time: 09/06/21  5:00 AM   Result Value Ref Range    TSH 1.83 0.36 - 3.74 uIU/mL   VITAMIN B12    Collection Time: 09/06/21  5:00 AM   Result Value Ref Range    Vitamin B12 260 193 - 986 pg/mL   LIPID PANEL    Collection Time: 09/06/21  5:00 AM   Result Value Ref Range    Cholesterol, total 169 <200 MG/DL    Triglyceride 66 <150 MG/DL    HDL Cholesterol 83 MG/DL    LDL, calculated 72.8 0 - 100 MG/DL    VLDL, calculated 13.2 MG/DL    CHOL/HDL Ratio 2.0 0.0 - 5.0         Imaging:  CT Results  (Last 48 hours)               09/05/21 2322  CTA CODE NEURO HEAD AND NECK W CONT Preliminary result    Narrative:  PRELIMINARY REPORT       1. No large vessel arterial occlusion in the head or neck. 2.  Focal severe stenosis of the lower right cervical vertebral artery. Multifocal stenoses of the right YOBANY. 3.  There are no regional areas of elevated Tmax, decreased cerebral blood flow   or blood volume. rCBF < 30% = 0 cc. Tmax > 6 seconds = 0 cc. Preliminary report was provided by Dr. Hardik Henderson, the on-call radiologist, at 1213   hours 9/6/2021       Final report to follow.        END PRELIMINARY REPORT                               09/05/21 2322  CT CODE NEURO PERF W CBF Preliminary result    Narrative:  PRELIMINARY REPORT       1. No large vessel arterial occlusion in the head or neck. 2.  Focal severe stenosis of the lower right cervical vertebral artery. Multifocal stenoses of the right YOBANY. 3.  There are no regional areas of elevated Tmax, decreased cerebral blood flow   or blood volume. rCBF < 30% = 0 cc. Tmax > 6 seconds = 0 cc. Preliminary report was provided by Dr. Go Jhaveri, the on-call radiologist, at 1213   hours 9/6/2021       Final report to follow. END PRELIMINARY REPORT                               09/05/21 2203  CT CODE NEURO HEAD WO CONTRAST Final result    Impression:  No acute intracranial abnormality on noncontrast head CT        Narrative:  EXAM: CT CODE NEURO HEAD WO CONTRAST       INDICATION: Code Stroke       COMPARISON: CT head 9/23/2017. TECHNIQUE: Unenhanced CT of the head was performed using 5 mm images. Brain and   bone windows were generated. CT dose reduction was achieved through use of a   standardized protocol tailored for this examination and automatic exposure   control for dose modulation. FINDINGS:   Mild generalized volume loss. Scattered white matter hypodensities may reflect   chronic microangiopathic change. . There is no intracranial hemorrhage,   extra-axial collection, mass, mass effect or midline shift. The basilar   cisterns are open. No acute infarct is identified. The bone windows demonstrate   no abnormalities. The visualized portions of the paranasal sinuses and mastoid   air cells are clear. Bilateral lens replacements. MRI of Brain on 9/6/2021 at 0555 preliminary report shows  1. No acute intracranial abnormality. 2.  Mild white matter disease may reflect chronic microangiopathic change  Preliminary report was provided by Dr. Go Jhaveri, the on-call radiologist, at 14 [facility]  hours 9/6/2021  Final report to follow.     Assessment and Plan:     Hospital Problems  Date Reviewed: 3/23/2019        Codes Class Noted POA    Left sided numbness ICD-10-CM: R20.0  ICD-9-CM: 782.0  9/6/2021 Unknown            This is a 44-year-old female who presented to the hospital with a transient episode of left-sided numbness that has mostly resolved. She subjectively tells me that she has been experiencing some abnormal sensations in the lateral aspect of her left foot on the arch and abnormal sensations in her fingertips on the left hand. She feels that the sensation in her left foot is relatively new, but she has been experiencing the sensations in her fingers tips for some time now, but was not able to give me a specific onset. CT of the head showed no acute process. CTA of the head and neck was reviewed by Dr. Mey Husain and shows a severe right YOBANY ICAD with segmental stenosis of the A2 and colossal marginal segments and is too distal for stenting per Dr. Mey Husain. There is also noted to be 60% stenosis of the cervical right vertebral artery at C6-7. The left vertebral artery also shows calcified plaque just distal to the origin within an S-shaped loop after review by Dr. Mey Husain. CT perfusion showed no perfusion abnormality. MRI of Brain was negative for acute stroke. Mild white matter disease was seen which may reflect chronic microangiopathic change. NIHSS 0.       TIA  - Hgb A1C ok at 5.7  - lipid panel shows LDL 72.8, continue 40 mg of Lipitor daily  - check ECHO for any heart abnormality  - PT/OT/SLP evals  - Stroke Education   - call code stroke for any acute neurological changes   - Neurology following     Intracranial Stenosis and ICAD  - agree with dual antiplatelet therapy with aspirin 81 mg daily and Plavix 75 mg daily  - check aspirin test and Plavix test later today to assess therapeutic response  - allow permissive HTN, would treat SBP >180, but avoid SBP <120  - continue with Lipitor as stated above  - NIS signing off, please call if needed     Hx of Alcohol Use  - patient reported she drinks 2-3 cans of beer daily  - monitor for alcohol withdrawal symptoms, patient may need CIWA protocol if indicated, discussed with Hospitalist NP    Plan discussed with Dr. Ignacia Dewey, Dr. Reji Lowe, RN, patient, and patient's daughter. Thank you for this consult and participating in the care of this patient.       Signed By: Sterling Phelps NP     September 6, 2021

## 2021-09-06 NOTE — CONSULTS
INPATIENT NEUROLOGY CONSULTATION  9/6/2021     Consulted by: Taniya Amador MD        Patient ID:  Camille Li  [id number]  80 y.o.  7/4/1931    CC: Left-sided numbness    HPI    Chris Mauro is a 61-year-old woman with a history of TIA, coronary artery disease on daily low-dose aspirin who tells me yesterday she felt numbness of the left arm and leg worse in the left foot. Symptoms mostly resolved but she says the left foot still persist now and is a little bit more intense. She denies having any true weakness. Her level of function is ambulatory with a Rollator. She lives in independent living. She denies any headache nausea dizziness. She sees her specialist at [facility]. MRI brain was completed which I reviewed without any clear evidence of stroke. CTA preliminary suggesting a right YOBANY right vertebral artery stenosis. Review of Systems   Neurological: Positive for sensory change. Negative for headaches. All other systems reviewed and are negative.       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, episode of care unspecified     Allergic rhinitis, cause unspecified     Brachial neuritis or radiculitis NOS     Cancer (HCC)     r arm basal cell    Cervicalgia     Disorder of bone and cartilage, unspecified     GERD (gastroesophageal reflux disease)     Hemorrhoid     Hypercholesterolemia     Incontinence of urine     Insomnia, unspecified     Leaky heart valve     Osteoarthrosis, unspecified whether generalized or localized, unspecified site     Osteoporosis, unspecified     Other abnormal blood chemistry     Pain in joint, shoulder region     r shoulder, pelvic, thigh, l hip, l forearm    Unspecified essential hypertension     Unspecified transient cerebral ischemia     Unspecified vitamin D deficiency     UTI (urinary tract infection)      Family History   Problem Relation Age of Onset    Cancer Other     Stroke Mother    Ernestodarcy Anuja Stroke Father     Heart Disease Sister     Heart Disease Brother     Stroke Brother      Social History     Socioeconomic History    Marital status:      Spouse name: Not on file    Number of children: Not on file    Years of education: Not on file    Highest education level: Not on file   Occupational History    Not on file   Tobacco Use    Smoking status: Former Smoker     Packs/day: 0.50     Years: 50.00     Pack years: 25.00     Types: Cigarettes     Quit date: 3/3/1995     Years since quittin.5    Smokeless tobacco: Former User   Substance and Sexual Activity    Alcohol use: Yes     Alcohol/week: 23.3 standard drinks     Types: 28 Standard drinks or equivalent per week     Comment: 3 per day    Drug use: No    Sexual activity: Never   Other Topics Concern    Not on file   Social History Narrative    Not on file     Social Determinants of Health     Financial Resource Strain:     Difficulty of Paying Living Expenses:    Food Insecurity:     Worried About Running Out of Food in the Last Year:     Ran Out of Food in the Last Year:    Transportation Needs:     Lack of Transportation (Medical):      Lack of Transportation (Non-Medical):    Physical Activity:     Days of Exercise per Week:     Minutes of Exercise per Session:    Stress:     Feeling of Stress :    Social Connections:     Frequency of Communication with Friends and Family:     Frequency of Social Gatherings with Friends and Family:     Attends Zoroastrianism Services:     Active Member of Clubs or Organizations:     Attends Club or Organization Meetings:     Marital Status:    Intimate Partner Violence:     Fear of Current or Ex-Partner:     Emotionally Abused:     Physically Abused:     Sexually Abused:      Current Facility-Administered Medications   Medication Dose Route Frequency    lisinopriL (PRINIVIL, ZESTRIL) tablet 20 mg  20 mg Oral DAILY    amLODIPine (NORVASC) tablet 5 mg  5 mg Oral DAILY    pantoprazole (PROTONIX) tablet 20 mg  20 mg Oral ACB    docusate (COLACE) 50 mg/5 mL oral liquid 50 mg  50 mg Oral DAILY    acetaminophen (TYLENOL) tablet 650 mg  650 mg Oral Q4H PRN    Or    acetaminophen (TYLENOL) solution 650 mg  650 mg Per NG tube Q4H PRN    Or    acetaminophen (TYLENOL) suppository 650 mg  650 mg Rectal Q4H PRN    aspirin chewable tablet 81 mg  81 mg Oral DAILY    clopidogreL (PLAVIX) tablet 75 mg  75 mg Oral DAILY    atorvastatin (LIPITOR) tablet 40 mg  40 mg Oral DAILY     Current Outpatient Medications   Medication Sig    ondansetron (ZOFRAN ODT) 8 mg disintegrating tablet Take 1 Tab by mouth every eight (8) hours as needed for Nausea.  docusate sodium (COLACE) 50 mg capsule Take two tabs twice daily for one week, then twice a day as needed thereafter. Hold for loose stools.  amLODIPine (NORVASC) 5 mg tablet Take 1 Tab by mouth daily.  atorvastatin (LIPITOR) 10 mg tablet Take 1 Tab by mouth daily.  Omeprazole delayed release (PRILOSEC D/R) 20 mg tablet Take 1 Tab by mouth daily. (Patient taking differently: Take 20 mg by mouth daily as needed.)    lisinopril (PRINIVIL, ZESTRIL) 20 mg tablet Take 1 Tab by mouth daily.  ketoconazole (NIZORAL) 2 % topical cream Apply  to affected area daily. (Patient taking differently: Apply  to affected area daily. Indications: left leg)    VIT C/CHERRY & CELERY EX/GRP E (TART CHERRY PO) Take  by mouth.  loratadine (CLARITIN) 10 mg tablet Take 10 mg by mouth daily as needed. Allergies   Allergen Reactions    Egg Diarrhea and Nausea Only    Other Plant, Animal, Environmental Runny Nose and Sneezing       Visit Vitals  BP (!) 171/74   Pulse 76   Temp 98.4 °F (36.9 °C)   Resp 15   Wt 127 lb 10.3 oz (57.9 kg)   SpO2 94%   BMI 24.93 kg/m²     Physical Exam  Vitals reviewed. Constitutional:       Appearance: Normal appearance. Neurological:      Mental Status: She is alert.        Neurologic Exam     Mental Status   Very bright elderly woman awake and alert who can follow commands. She knows where she is. She understands why she is here. Pupils are equal symmetric with a conjugate gaze. EOMI. Face is grossly symmetric on smile tongue is midline speech is clear without aphasia  Right upper extremity 5/5 left upper extremity I would grade 4+ which is a history of fracture with hardware in place. She tells me she is baseline in the arms. Right lower extremity I would grade 4+ as well but she has severe right hip right knee arthritis preventing good testing. Left leg I would grade 5/5 supine  Sensation grossly intact to light touch  Finger-nose intact  No abnormal movements  Gait deferred            Lab Results   Component Value Date/Time    WBC 7.4 09/05/2021 10:00 PM    HGB 13.0 09/05/2021 10:00 PM    HCT 39.4 09/05/2021 10:00 PM    PLATELET 892 62/21/6785 10:00 PM    MCV 91.2 09/05/2021 10:00 PM     Lab Results   Component Value Date/Time    Hemoglobin A1c 5.7 (H) 09/06/2021 05:00 AM    Glucose 94 09/05/2021 10:00 PM    Glucose (POC) 103 09/05/2021 10:08 PM    LDL, calculated 72.8 09/06/2021 05:00 AM    Creatinine 0.69 09/05/2021 10:00 PM      Lab Results   Component Value Date/Time    Cholesterol, total 169 09/06/2021 05:00 AM    HDL Cholesterol 83 09/06/2021 05:00 AM    LDL, calculated 72.8 09/06/2021 05:00 AM    LDL-C, External 104 07/24/2012 12:00 AM    Triglyceride 66 09/06/2021 05:00 AM    CHOL/HDL Ratio 2.0 09/06/2021 05:00 AM     Lab Results   Component Value Date/Time    ALT (SGPT) 23 09/05/2021 10:00 PM    Alk. phosphatase 68 09/05/2021 10:00 PM    Bilirubin, total 0.4 09/05/2021 10:00 PM    Albumin 3.3 (L) 09/05/2021 10:00 PM    Protein, total 6.3 (L) 09/05/2021 10:00 PM    INR 1.0 09/05/2021 10:00 PM    Prothrombin time 10.0 09/05/2021 10:00 PM    PLATELET 118 35/28/2355 10:00 PM        CT Results (maximum last 3):   Results from [place] encounter on 09/05/21    CT CODE NEURO PERF W CBF    Narrative  *PRELIMINARY REPORT*    1. No large vessel arterial occlusion in the head or neck. 2.  Focal severe stenosis of the lower right cervical vertebral artery. Multifocal stenoses of the right YOBANY. 3.  There are no regional areas of elevated Tmax, decreased cerebral blood flow  or blood volume. rCBF < 30% = 0 cc. Tmax > 6 seconds = 0 cc. Preliminary report was provided by Dr. Everett Holm, the on-call radiologist, at 1213  hours 9/6/2021    Final report to follow. *END PRELIMINARY REPORT*      CTA CODE NEURO HEAD AND NECK W CONT    Narrative  *PRELIMINARY REPORT*    1. No large vessel arterial occlusion in the head or neck. 2.  Focal severe stenosis of the lower right cervical vertebral artery. Multifocal stenoses of the right YOBANY. 3.  There are no regional areas of elevated Tmax, decreased cerebral blood flow  or blood volume. rCBF < 30% = 0 cc. Tmax > 6 seconds = 0 cc. Preliminary report was provided by Dr. Everett Holm, the on-call radiologist, at 1213  hours 9/6/2021    Final report to follow. *END PRELIMINARY REPORT*      MRI Results (maximum last 3): Results from [place] encounter on 09/05/21    MRI BRAIN WO CONT    Narrative  *PRELIMINARY REPORT*    1.  No acute intracranial abnormality. 2.  Mild white matter disease may reflect chronic microangiopathic change    Preliminary report was provided by Dr. Everett Holm, the on-call radiologist, at [address] [place] [address]  hours 9/6/2021    Final report to follow. *END PRELIMINARY REPORT*      Results from [place] encounter on 10/31/12    MRI CERV SPINE WO CONT    Narrative  **Final Report**      ICD Codes / Adm. Diagnosis: 723.1   / Cervicalgia  Examination:  MRI C SPINE WO CON  - [id number] - Oct 31 2012  1:57PM  Accession No:  [id number]  Reason:  NECK PAIN , R/O HNP      REPORT:  Indication: Neck pain    Exam: MRI cervical spine. Comparisons: none    Sequences: Sagittal T1, T2, and STIR. Axial T1, gradient echo.  No contrast.    FINDINGS: Alignment on the sagittal images is normal.  There are endplate  degenerative changes. The visualized posterior fossa and cord signal are  normal. Paraspinous soft tissues are within normal limits. C2-C3: There are bilateral uncovertebral degenerative changes slightly  narrowing the foramen    C3-C4: There are left-sided uncovertebral degenerative changes and facet  degenerative change. There are right-sided uncovertebral degenerative  changes. There is mild to moderate left foraminal narrowing    C4-C5: There is a slight disc osteophytic bulge with uncovertebral  degenerative change left greater than right and left facet arthropathy. There is borderline canal and mild left foraminal narrowing    C5-C6: There is a diffuse disc osteophytic bulge with bilateral  uncovertebral degenerative change. There is mild narrowing of the canal.  There is moderate right greater than left foraminal narrowing    C6-C7: There is a diffuse disc osteophytic bulge with bilateral  uncovertebral degenerative change. Canal stenosis is mild with moderate  bilateral foraminal narrowing    Examination of the upper thoracic spine demonstrates no significant stenosis. IMPRESSION:  1. Multilevel degenerative change detailed by level above most significant  at C6-C7            Signing/Reading Doctor: Cris Brown ([id number])  Approved: Cris Brown ([id number])  10/31/2012      VAS/US/Carotid Doppler Results (maximum last 3): No results found for this or any previous visit. PET Results (maximum last 3): No results found for this or any previous visit. Assessment and Plan        72-year-old woman who presents with left-sided numbness that mostly has resolved but she tells me now the left foot is acting up more intensely particularly distally in the arch. Is not clear if this is exactly new but certainly is more noticeable to her now.   Certainly the preliminary CTA suggest there could be some flow issues in the right anterior hemisphere potentially contributing to her current symptoms. She needs to get IV fluids. Dual antiplatelets to start now. I contacted neuro interventional for an opinion this morning regarding the intracranial vasculature. If she develops any distinctly new symptoms such as weakness or vision or speech changes please activate code stroke. Please start IV fluids. During this evaluation, we also discussed stroke education to include signs and symptoms of stroke and TIA. This clinical note was dictated with an electronic dictation software that can make unintentional errors. If there are any questions, please contact me directly for clarification.       [facility],   NEUROLOGIST  Diplomate POLO  9/6/2021

## 2021-09-06 NOTE — PROGRESS NOTES
Problem: Self Care Deficits Care Plan (Adult)  Goal: *Acute Goals and Plan of Care (Insert Text)  Description:   FUNCTIONAL STATUS PRIOR TO ADMISSION: Patient was modified independent using a rollator and single point cane for functional mobility. HOME SUPPORT: Patient lives in [address] [facility] facility. Occupational Therapy Goals  Initiated 9/6/2021  1. Patient will perform grooming standing at sink with supervision/set-up within 7 day(s). 2.  Patient will perform upper body dressing with modified independence within 7 day(s). 3.  Patient will perform lower body dressing with modified independence within 7 day(s). 4.  Patient will perform toilet transfers with supervision/set-up within 7 day(s). 5.  Patient will perform all aspects of toileting with modified independence within 7 day(s). 6.  Patient will participate in upper extremity therapeutic exercise/activities with minimal assistance/contact guard assist for 15 minutes within 7 day(s). 7.  Patient will utilize energy conservation techniques during functional activities with verbal and visual cues within 7 day(s). 9/6/2021 1533 by Cruz Mujica OT  Outcome: Progressing Towards Goal    OCCUPATIONAL THERAPY EVALUATION  Patient: Petra Gutiérrez (87 y.o. female)  Date: 9/6/2021  Primary Diagnosis: Left sided numbness [R20.0]        Precautions:   Fall    ASSESSMENT  Based on the objective data described below, the patient presents with decreased balance, L side numbness and altered sensation, Reports her paresthesias have largely resolved, but she does still have some altered sensation in her L foot and hand reporting she feels something that she knows is not there. Able to perform seated ADLs with CGA/SBA and standing tasks with CGA/min A for balance. Some unsteadiness with turning, reports she \"furniture surfs\" at home and observed doing it here as well.  As she lives alone, would anticipate she will benefit from therapy services to progress to safe functional independence and ADL performance to ensure safe transition to home. Reports she has done rehab (SNF and [facility])  in past, and is not interested in pursing it at d/c. OT to continue to follow for ADL training/education and home safety recommendations to facilitate safe transfer home. Current Level of Function Impacting Discharge (ADLs/self-care): CGA/MIN A for balance/mobility during ADL related tasks    Functional Outcome Measure: The patient scored Total: 65/100 on the Barthel Index outcome measure. Other factors to consider for discharge: lives alone     Patient will benefit from skilled therapy intervention to address the above noted impairments. PLAN :  Recommendations and Planned Interventions: self care training, functional mobility training, therapeutic exercise, balance training, visual/perceptual training, therapeutic activities, cognitive retraining, endurance activities, neuromuscular re-education, patient education, home safety training, and family training/education    Frequency/Duration: Patient will be followed by occupational therapy 4 times a week to address goals. Recommendation for discharge: (in order for the patient to meet his/her long term goals)  Occupational therapy at least 2 days/week in the home AND ensure assist and/or supervision for safety with ADLs    This discharge recommendation:  Has not yet been discussed the attending provider and/or case management    IF patient discharges home will need the following DME: patient owns DME required for discharge       SUBJECTIVE:   Patient stated I've had therapy before and I don't think I'm going to do it again.     OBJECTIVE DATA SUMMARY:   HISTORY:   Past Medical History:   Diagnosis Date    Acute myocardial infarction of other inferior wall, episode of care unspecified     Allergic rhinitis, cause unspecified     Brachial neuritis or radiculitis NOS     Cancer ([facility] [address]     r arm basal cell Cervicalgia     Disorder of bone and cartilage, unspecified     GERD (gastroesophageal reflux disease)     Hemorrhoid     Hypercholesterolemia     Incontinence of urine     Insomnia, unspecified     Leaky heart valve     Osteoarthrosis, unspecified whether generalized or localized, unspecified site     Osteoporosis, unspecified     Other abnormal blood chemistry     Pain in joint, shoulder region     r shoulder, pelvic, thigh, l hip, l forearm    Unspecified essential hypertension     Unspecified transient cerebral ischemia     Unspecified vitamin D deficiency     UTI (urinary tract infection)      Past Surgical History:   Procedure Laterality Date    HX BUNIONECTOMY      HX CATARACT REMOVAL Bilateral     with L implant, R eye hard contact    HX COLONOSCOPY      HX LAP CHOLECYSTECTOMY  2013    HX PARTIAL HYSTERECTOMY      HX [address]       Expanded or extensive additional review of patient history:     Home Situation  Home Environment: Independent living  [address] [place] [address] Glenn Name: [facility]  # Steps to Enter: 0  One/Two Story Residence: One story  Living Alone: Yes  Support Systems:  ([facility] [address])  Patient Expects to be Discharged to[de-identified] Independent living facility  Current DME Used/Available at Home: Cane, straight, Transfer bench, Grab bars  Tub or Shower Type: Shower    Hand dominance: Right    EXAMINATION OF PERFORMANCE DEFICITS:  Cognitive/Behavioral Status:  Neurologic State: Alert;Eyes open spontaneously  Orientation Level: Oriented X4  Cognition: Appropriate decision making; Appropriate for age attention/concentration; Appropriate safety awareness; Follows commands             Skin: no issues noted, see nursing notes for details    Edema: none     Hearing:   [place] bilaterally    Vision/Perceptual:                                     Range of Motion:    AROM: Generally decreased, functional                         Strength:    Strength: Generally decreased, functional (symmetrical)                Coordination:  Coordination: Within functional limits  Fine Motor Skills-Upper: Left Impaired;Right Intact    Gross Motor Skills-Upper: Left Intact; Right Intact    Tone & Sensation:    Tone: Normal  Sensation:  (endorses paresthesias in B hands)                      Balance:  Sitting: Intact; Without support  Standing: Impaired; With support (SPC)  Standing - Static: Fair;Occasional  Standing - Dynamic : Fair;Occasional    Functional Mobility and Transfers for ADLs:  Bed Mobility:  Supine to Sit: Modified independent  Sit to Supine:  (sitting up EOB after)    Transfers:  Sit to Stand: Contact guard assistance; Adaptive equipment; Additional time  Stand to Sit: Contact guard assistance; Adaptive equipment; Additional time  Bed to Chair: Contact guard assistance; Adaptive equipment; Additional time    ADL Assessment:  Feeding: Modified independent    Oral Facial Hygiene/Grooming: Stand-by assistance;Contact guard assistance    Bathing: Contact guard assistance    Upper Body Dressing: Stand-by assistance;Contact guard assistance    Lower Body Dressing: Contact guard assistance    Toileting: Contact guard assistance                ADL Intervention and task modifications:                           Lower Body Dressing Assistance  Socks: Contact guard assistance                Functional Measure:  Barthel Index:    Bathin  Bladder: 5  Bowels: 10  Groomin  Dressin  Feeding: 10  Mobility: 10  Stairs: 5  Toilet Use: 5  Transfer (Bed to Chair and Back): 10  Total: 65/100        The Barthel ADL Index: Guidelines  1. The index should be used as a record of what a patient does, not as a record of what a patient could do. 2. The main aim is to establish degree of independence from any help, physical or verbal, however minor and for whatever reason. 3. The need for supervision renders the patient not independent. 4. A patient's performance should be established using the best available evidence. Asking the patient, friends/relatives and nurses are the usual sources, but direct observation and common sense are also important. However direct testing is not needed. 5. Usually the patient's performance over the preceding 24-48 hours is important, but occasionally longer periods will be relevant. 6. Middle categories imply that the patient supplies over 50 per cent of the effort. 7. Use of aids to be independent is allowed. Kristel Georges., Barthel, D.W. ([id number]). Functional evaluation: the Barthel Index. [facility] (14)2. Zachery Cisneros leah ARABELLA Louise, Marvin Roberts., Enid Delgado., [place], [facility] (1999). Measuring the change indisability after inpatient rehabilitation; comparison of the responsiveness of the Barthel Index and Functional [place] Measure. Journal of Neurology, Neurosurgery, and Psychiatry, 66(4), 430-314. Milana Mckenna, N.J.A, NESSA Hays, & Muriel Sagastume MRenaA. (2004.) Assessment of post-stroke quality of life in cost-effectiveness studies: The usefulness of the Barthel Index and the EuroQoL-5D. Quality of Life Research, 15, 549-21         Occupational Therapy Evaluation Charge Determination   History Examination Decision-Making   MEDIUM Complexity : Expanded review of history including physical, cognitive and psychosocial  history  MEDIUM Complexity : 3-5 performance deficits relating to physical, cognitive , or psychosocial skils that result in activity limitations and / or participation restrictions MEDIUM Complexity : Patient may present with comorbidities that affect occupational performnce.  Miniml to moderate modification of tasks or assistance (eg, physical or verbal ) with assesment(s) is necessary to enable patient to complete evaluation       Based on the above components, the patient evaluation is determined to be of the following complexity level: MEDIUM  Pain Rating:  Not rated/reported    Activity Tolerance:   Fair    After treatment patient left in no apparent distress:    Sitting in chair, Call bell within reach, and Nursing at bedside    COMMUNICATION/EDUCATION:   The patients plan of care was discussed with: Physical therapist and Registered nurse. Home safety education was provided and the patient/caregiver indicated understanding. and Patient/family have participated as able in goal setting and plan of care. This patients plan of care is appropriate for delegation to [facility].     Thank you for this referral.  Shruti Ferguson OT  Time Calculation: 25 mins

## 2021-09-06 NOTE — H&P
History & Physical    Primary Care Provider: Rod Farias MD  Source of Information: Patient and chart review    History of Presenting Illness:   Evelia Nava is a 80 y.o. female with past medical history of hypertension, GERD, chronic constipation, dyslipidemia who presents to hospital with complaints of left-sided weakness. Patient states she has prior to ER arrival, her entire left side from the top of her head to the left foot was numb. She denies any associated weakness. When questioned about complete loss of sensation, she states she is unable to fully describe the sensation but felt unusual.  She has had these episodes intermittently in the soles of her feet. Reports history of TIA about 6 months ago and had normal work-up and received. She has had no recent travel or sick contacts. The patient denies any fever, chills, chest pain, cough, congestion, recent illness, palpitations, or dysuria. Vitals on ER presentation remarkable for BP of 158/75. Labs overall unremarkable and CT head and CTA head and neck showed no acute pathology. Teleneurology evaluated patient and recommended admission for MRI and EEG     Review of Systems:  A comprehensive review of systems was negative except for that written in the History of Present Illness.      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, episode of care unspecified     Allergic rhinitis, cause unspecified     Brachial neuritis or radiculitis NOS     Cancer (HCC)     r arm basal cell    Cervicalgia     Disorder of bone and cartilage, unspecified     GERD (gastroesophageal reflux disease)     Hemorrhoid     Hypercholesterolemia     Incontinence of urine     Insomnia, unspecified     Leaky heart valve     Osteoarthrosis, unspecified whether generalized or localized, unspecified site     Osteoporosis, unspecified     Other abnormal blood chemistry     Pain in joint, shoulder region     r shoulder, pelvic, thigh, l hip, l forearm    Unspecified essential hypertension     Unspecified transient cerebral ischemia     Unspecified vitamin D deficiency     UTI (urinary tract infection)       Past Surgical History:   Procedure Laterality Date    HX BUNIONECTOMY      HX CATARACT REMOVAL Bilateral     with L implant, R eye hard contact    HX COLONOSCOPY      HX LAP CHOLECYSTECTOMY  2013    HX PARTIAL HYSTERECTOMY      HX TUBAL LIGATION  1955     Prior to Admission medications    Medication Sig Start Date End Date Taking? Authorizing Provider   ondansetron (ZOFRAN ODT) 8 mg disintegrating tablet Take 1 Tab by mouth every eight (8) hours as needed for Nausea. 4/25/19   Aditi Martin MD   docusate sodium (COLACE) 50 mg capsule Take two tabs twice daily for one week, then twice a day as needed thereafter. Hold for loose stools. 4/25/19   Aditi Martin MD   amLODIPine (NORVASC) 5 mg tablet Take 1 Tab by mouth daily. 2/15/19   Nilsa Rodrigues MD   atorvastatin (LIPITOR) 10 mg tablet Take 1 Tab by mouth daily. 12/17/18   Nilsa Rodrigues MD   Omeprazole delayed release (PRILOSEC D/R) 20 mg tablet Take 1 Tab by mouth daily. Patient taking differently: Take 20 mg by mouth daily as needed. 9/13/18   Nilsa Rodrigues MD   lisinopril (PRINIVIL, ZESTRIL) 20 mg tablet Take 1 Tab by mouth daily. 8/24/18   Nilsa Rodrigues MD   ketoconazole (NIZORAL) 2 % topical cream Apply  to affected area daily. Patient taking differently: Apply  to affected area daily. Indications: left leg 10/25/17   Nilsa Rodrigues MD   VIT C/HARTMANN & CELERY EX/GRP E (TART CHERRY PO) Take  by mouth. Provider, Historical   loratadine (CLARITIN) 10 mg tablet Take 10 mg by mouth daily as needed.     Provider, Historical     Allergies   Allergen Reactions    Egg Diarrhea and Nausea Only    Other Plant, Animal, Environmental Runny Nose and Sneezing      Family History   Problem Relation Age of Onset    Cancer Other     Stroke Mother     Stroke Father     Heart Disease Sister     Heart Disease Brother     Stroke Brother         SOCIAL HISTORY:  Patient resides:  Independently    Assisted Living x   SNF    With family care       Smoking history:   None x   Former    Chronic      Alcohol history:   None x   Social    Chronic      Ambulates:   Independently    w/cane x   w/walker x   w/wc    CODE STATUS:  DNR    Full x   Other      Objective:     Physical Exam:     Visit Vitals  BP (!) 173/71   Pulse (!) 102   Temp 98.4 °F (36.9 °C)   Resp 18   Wt 57.9 kg (127 lb 10.3 oz)   SpO2 94%   BMI 24.93 kg/m²      O2 Device: None (Room air)    General:  Alert, cooperative, no distress, appears stated age. Head:  Normocephalic, without obvious abnormality, atraumatic. Eyes:  Conjunctivae/corneas clear. PERRL, EOMs intact. Nose: Nares normal. Septum midline. Mucosa normal.        Neck: Supple, symmetrical, trachea midline, no carotid bruit and no JVD. Lungs:   Clear to auscultation bilaterally. Chest wall:  No tenderness or deformity. Heart:  Regular rate and rhythm, S1, S2 normal, no murmur, click, rub or gallop. Abdomen:   Soft, non-tender. Bowel sounds normal. No masses,  No organomegaly. Extremities: Extremities normal, atraumatic, no cyanosis or edema. Pulses: 2+ and symmetric all extremities. Skin: Skin color, texture, turgor normal. No rashes or lesions   Neurologic: CNII-XII intact. EKG: Normal sinus rhythm  Data Review:     Recent Days:  Recent Labs     09/05/21  2200   WBC 7.4   HGB 13.0   HCT 39.4        Recent Labs     09/05/21  2200      K 4.6   *   CO2 22   GLU 94   BUN 10   CREA 0.69   CA 8.2*   ALB 3.3*   ALT 23   INR 1.0     No results for input(s): PH, PCO2, PO2, HCO3, FIO2 in the last 72 hours.     24 Hour Results:  Recent Results (from the past 24 hour(s))   CBC WITH AUTOMATED DIFF    Collection Time: 09/05/21 10:00 PM   Result Value Ref Range    WBC 7.4 3.6 - 11.0 K/uL    RBC 4.32 3.80 - 5.20 M/uL    HGB 13.0 11.5 - 16.0 g/dL    HCT 39.4 35.0 - 47.0 %    MCV 91.2 80.0 - 99.0 FL    MCH 30.1 26.0 - 34.0 PG    MCHC 33.0 30.0 - 36.5 g/dL    RDW 14.7 (H) 11.5 - 14.5 %    PLATELET 995 126 - 762 K/uL    MPV 9.8 8.9 - 12.9 FL    NRBC 0.0 0  WBC    ABSOLUTE NRBC 0.00 0.00 - 0.01 K/uL    NEUTROPHILS 56 32 - 75 %    LYMPHOCYTES 31 12 - 49 %    MONOCYTES 8 5 - 13 %    EOSINOPHILS 4 0 - 7 %    BASOPHILS 1 0 - 1 %    IMMATURE GRANULOCYTES 0 0.0 - 0.5 %    ABS. NEUTROPHILS 4.2 1.8 - 8.0 K/UL    ABS. LYMPHOCYTES 2.3 0.8 - 3.5 K/UL    ABS. MONOCYTES 0.6 0.0 - 1.0 K/UL    ABS. EOSINOPHILS 0.3 0.0 - 0.4 K/UL    ABS. BASOPHILS 0.1 0.0 - 0.1 K/UL    ABS. IMM. GRANS. 0.0 0.00 - 0.04 K/UL    DF AUTOMATED     METABOLIC PANEL, COMPREHENSIVE    Collection Time: 09/05/21 10:00 PM   Result Value Ref Range    Sodium 139 136 - 145 mmol/L    Potassium 4.6 3.5 - 5.1 mmol/L    Chloride 110 (H) 97 - 108 mmol/L    CO2 22 21 - 32 mmol/L    Anion gap 7 5 - 15 mmol/L    Glucose 94 65 - 100 mg/dL    BUN 10 6 - 20 MG/DL    Creatinine 0.69 0.55 - 1.02 MG/DL    BUN/Creatinine ratio 14 12 - 20      GFR est AA >60 >60 ml/min/1.73m2    GFR est non-AA >60 >60 ml/min/1.73m2    Calcium 8.2 (L) 8.5 - 10.1 MG/DL    Bilirubin, total 0.4 0.2 - 1.0 MG/DL    ALT (SGPT) 23 12 - 78 U/L    AST (SGOT) 23 15 - 37 U/L    Alk.  phosphatase 68 45 - 117 U/L    Protein, total 6.3 (L) 6.4 - 8.2 g/dL    Albumin 3.3 (L) 3.5 - 5.0 g/dL    Globulin 3.0 2.0 - 4.0 g/dL    A-G Ratio 1.1 1.1 - 2.2     PROTHROMBIN TIME + INR    Collection Time: 09/05/21 10:00 PM   Result Value Ref Range    INR 1.0 0.9 - 1.1      Prothrombin time 10.0 9.0 - 11.1 sec   TROPONIN I    Collection Time: 09/05/21 10:00 PM   Result Value Ref Range    Troponin-I, Qt. <0.05 <0.05 ng/mL   SAMPLES BEING HELD    Collection Time: 09/05/21 10:00 PM   Result Value Ref Range    SAMPLES BEING HELD 1RED,1SST,1LAV,1BLU     COMMENT        Add-on orders for these samples will be processed based on acceptable specimen integrity and analyte stability, which may vary by analyte. GLUCOSE, POC    Collection Time: 09/05/21 10:08 PM   Result Value Ref Range    Glucose (POC) 103 65 - 117 mg/dL    Performed by Wilmar Mckeon    EKG, 12 LEAD, INITIAL    Collection Time: 09/06/21 12:17 AM   Result Value Ref Range    Ventricular Rate 95 BPM    Atrial Rate 95 BPM    P-R Interval 180 ms    QRS Duration 98 ms    Q-T Interval 362 ms    QTC Calculation (Bezet) 454 ms    Calculated P Axis 51 degrees    Calculated R Axis -35 degrees    Calculated T Axis 56 degrees    Diagnosis       Normal sinus rhythm  Left axis deviation  Minimal voltage criteria for LVH, may be normal variant ( [place] product )  Inferior infarct (cited on or before 16-JUN-2015)  Anterolateral infarct (cited on or before 09-NOV-2005)  Abnormal ECG  When compared with ECG of 24-APR-2019 16:25,  QRS duration has increased  Questionable change in initial forces of Lateral leads  QT has lengthened           Imaging:     Assessment:     Rachel Ramírez is a 80 y.o. female with past medical history of hypertension, GERD, chronic constipation, dyslipidemia who is admitted for left-sided numbness.        Plan:       Left-sided numbness  -At time of my evaluation, symptoms have resolved  -Admit to neuro telemetry for additional work-up  -MRI brain ordered  -EEG per neurology  -TSH, magnesium, phosphorus, B12, folate, lipid panel  -PT and OT consult    Hypertension  -Resume home antihypertensives in a.m.  -?  Permissive hypertension    GERD  -Home Protonix    Dyslipidemia  -Home statin            FEN/GI -  Po hydration  Activity - as tolerated  DVT prophylaxis - scds  GI prophylaxis -  ni  Disposition - home    CODE STATUS:  Full code       Signed By: Maricel Hathaway MD     September 6, 2021

## 2021-09-07 ENCOUNTER — APPOINTMENT (OUTPATIENT)
Dept: NON INVASIVE DIAGNOSTICS | Age: 86
DRG: 068 | End: 2021-09-07
Attending: NURSE PRACTITIONER
Payer: MEDICARE

## 2021-09-07 VITALS
SYSTOLIC BLOOD PRESSURE: 140 MMHG | WEIGHT: 125 LBS | DIASTOLIC BLOOD PRESSURE: 65 MMHG | HEIGHT: 61 IN | RESPIRATION RATE: 21 BRPM | TEMPERATURE: 98.8 F | BODY MASS INDEX: 23.6 KG/M2 | OXYGEN SATURATION: 97 % | HEART RATE: 93 BPM

## 2021-09-07 PROBLEM — R20.0 LEFT SIDED NUMBNESS: Status: RESOLVED | Noted: 2021-09-06 | Resolved: 2021-09-07

## 2021-09-07 LAB
ANION GAP SERPL CALC-SCNC: 4 MMOL/L (ref 5–15)
BASOPHILS # BLD: 0.1 K/UL (ref 0–0.1)
BASOPHILS NFR BLD: 1 % (ref 0–1)
BUN SERPL-MCNC: 9 MG/DL (ref 6–20)
BUN/CREAT SERPL: 12 (ref 12–20)
CALCIUM SERPL-MCNC: 8.7 MG/DL (ref 8.5–10.1)
CHLORIDE SERPL-SCNC: 111 MMOL/L (ref 97–108)
CO2 SERPL-SCNC: 26 MMOL/L (ref 21–32)
CREAT SERPL-MCNC: 0.74 MG/DL (ref 0.55–1.02)
DIFFERENTIAL METHOD BLD: ABNORMAL
ECHO AO ROOT DIAM: 2.86 CM
ECHO AV AREA PEAK VELOCITY: 0.98 CM2
ECHO AV AREA PEAK VELOCITY: 1.02 CM2
ECHO AV AREA PEAK VELOCITY: 1.14 CM2
ECHO AV AREA PEAK VELOCITY: 1.19 CM2
ECHO AV AREA VTI: 1.13 CM2
ECHO AV AREA/BSA VTI: 0.7 CM2/M2
ECHO AV MEAN GRADIENT: 11.71 MMHG
ECHO AV PEAK GRADIENT: 21.61 MMHG
ECHO AV PEAK GRADIENT: 23.57 MMHG
ECHO AV PEAK VELOCITY: 232.43 CM/S
ECHO AV PEAK VELOCITY: 242.73 CM/S
ECHO AV VTI: 45.09 CM
ECHO LA AREA 4C: 20.85 CM2
ECHO LA MAJOR AXIS: 4.83 CM
ECHO LA MINOR AXIS: 3.12 CM
ECHO LA VOL 2C: 85.99 ML (ref 22–52)
ECHO LA VOL 4C: 55.79 ML (ref 22–52)
ECHO LA VOL BP: 75.42 ML (ref 22–52)
ECHO LA VOL/BSA BIPLANE: 48.66 ML/M2 (ref 16–28)
ECHO LA VOLUME INDEX A2C: 55.48 ML/M2 (ref 16–28)
ECHO LA VOLUME INDEX A4C: 35.99 ML/M2 (ref 16–28)
ECHO LV INTERNAL DIMENSION DIASTOLIC: 3.15 CM (ref 3.9–5.3)
ECHO LV INTERNAL DIMENSION SYSTOLIC: 2.39 CM
ECHO LV IVSD: 0.94 CM (ref 0.6–0.9)
ECHO LV MASS 2D: 83.7 G (ref 67–162)
ECHO LV MASS INDEX 2D: 54 G/M2 (ref 43–95)
ECHO LV POSTERIOR WALL DIASTOLIC: 0.99 CM (ref 0.6–0.9)
ECHO LVOT DIAM: 1.8 CM
ECHO LVOT PEAK GRADIENT: 3.47 MMHG
ECHO LVOT PEAK GRADIENT: 4.7 MMHG
ECHO LVOT PEAK VELOCITY: 108.41 CM/S
ECHO LVOT PEAK VELOCITY: 93.11 CM/S
ECHO LVOT SV: 51 ML
ECHO LVOT VTI: 20.03 CM
ECHO MV A VELOCITY: 163.09 CM/S
ECHO MV AREA PHT: 2.14 CM2
ECHO MV E DECELERATION TIME (DT): 353.71 MS
ECHO MV E VELOCITY: 111.91 CM/S
ECHO MV E/A RATIO: 0.69
ECHO MV PRESSURE HALF TIME (PHT): 102.58 MS
ECHO PV PEAK INSTANTANEOUS GRADIENT SYSTOLIC: 3.98 MMHG
ECHO RV INTERNAL DIMENSION: 4.42 CM
ECHO RV TAPSE: 2.31 CM (ref 1.5–2)
ECHO TV REGURGITANT MAX VELOCITY: 287.56 CM/S
ECHO TV REGURGITANT PEAK GRADIENT: 33.08 MMHG
EOSINOPHIL # BLD: 0.3 K/UL (ref 0–0.4)
EOSINOPHIL NFR BLD: 3 % (ref 0–7)
ERYTHROCYTE [DISTWIDTH] IN BLOOD BY AUTOMATED COUNT: 14.6 % (ref 11.5–14.5)
GLUCOSE SERPL-MCNC: 116 MG/DL (ref 65–100)
HCT VFR BLD AUTO: 45.5 % (ref 35–47)
HGB BLD-MCNC: 14.4 G/DL (ref 11.5–16)
IMM GRANULOCYTES # BLD AUTO: 0 K/UL (ref 0–0.04)
IMM GRANULOCYTES NFR BLD AUTO: 0 % (ref 0–0.5)
LYMPHOCYTES # BLD: 1.8 K/UL (ref 0.8–3.5)
LYMPHOCYTES NFR BLD: 22 % (ref 12–49)
MCH RBC QN AUTO: 29.6 PG (ref 26–34)
MCHC RBC AUTO-ENTMCNC: 31.6 G/DL (ref 30–36.5)
MCV RBC AUTO: 93.4 FL (ref 80–99)
MONOCYTES # BLD: 0.6 K/UL (ref 0–1)
MONOCYTES NFR BLD: 7 % (ref 5–13)
NEUTS SEG # BLD: 5.5 K/UL (ref 1.8–8)
NEUTS SEG NFR BLD: 67 % (ref 32–75)
NRBC # BLD: 0 K/UL (ref 0–0.01)
NRBC BLD-RTO: 0 PER 100 WBC
PLATELET # BLD AUTO: 290 K/UL (ref 150–400)
PMV BLD AUTO: 9.3 FL (ref 8.9–12.9)
POTASSIUM SERPL-SCNC: 4.1 MMOL/L (ref 3.5–5.1)
RBC # BLD AUTO: 4.87 M/UL (ref 3.8–5.2)
SODIUM SERPL-SCNC: 141 MMOL/L (ref 136–145)
WBC # BLD AUTO: 8.3 K/UL (ref 3.6–11)

## 2021-09-07 PROCEDURE — 74011250637 HC RX REV CODE- 250/637: Performed by: PSYCHIATRY & NEUROLOGY

## 2021-09-07 PROCEDURE — 36415 COLL VENOUS BLD VENIPUNCTURE: CPT

## 2021-09-07 PROCEDURE — 74011250637 HC RX REV CODE- 250/637: Performed by: STUDENT IN AN ORGANIZED HEALTH CARE EDUCATION/TRAINING PROGRAM

## 2021-09-07 PROCEDURE — 85025 COMPLETE CBC W/AUTO DIFF WBC: CPT

## 2021-09-07 PROCEDURE — 80048 BASIC METABOLIC PNL TOTAL CA: CPT

## 2021-09-07 PROCEDURE — 93306 TTE W/DOPPLER COMPLETE: CPT

## 2021-09-07 PROCEDURE — 99232 SBSQ HOSP IP/OBS MODERATE 35: CPT | Performed by: PSYCHIATRY & NEUROLOGY

## 2021-09-07 RX ORDER — GUAIFENESIN 100 MG/5ML
81 LIQUID (ML) ORAL DAILY
Qty: 30 TABLET | Refills: 0 | Status: SHIPPED | OUTPATIENT
Start: 2021-09-08 | End: 2021-10-08

## 2021-09-07 RX ORDER — CLOPIDOGREL BISULFATE 75 MG/1
75 TABLET ORAL DAILY
Qty: 30 TABLET | Refills: 0 | Status: SHIPPED | OUTPATIENT
Start: 2021-09-08 | End: 2021-10-08

## 2021-09-07 RX ORDER — GUAIFENESIN 100 MG/5ML
81 LIQUID (ML) ORAL DAILY
Qty: 30 TABLET | Refills: 0 | Status: SHIPPED | OUTPATIENT
Start: 2021-09-08 | End: 2021-09-07

## 2021-09-07 RX ORDER — CLOPIDOGREL BISULFATE 75 MG/1
75 TABLET ORAL DAILY
Qty: 30 TABLET | Refills: 0 | Status: SHIPPED | OUTPATIENT
Start: 2021-09-08 | End: 2021-09-07

## 2021-09-07 RX ADMIN — CLOPIDOGREL BISULFATE 75 MG: 75 TABLET ORAL at 08:21

## 2021-09-07 RX ADMIN — ASPIRIN 81 MG: 81 TABLET, CHEWABLE ORAL at 08:21

## 2021-09-07 RX ADMIN — PANTOPRAZOLE SODIUM 20 MG: 20 TABLET, DELAYED RELEASE ORAL at 08:21

## 2021-09-07 RX ADMIN — ATORVASTATIN CALCIUM 40 MG: 40 TABLET, FILM COATED ORAL at 08:21

## 2021-09-07 NOTE — DISCHARGE INSTRUCTIONS
Discharge Instructions       PATIENT ID: Clovis White  MRN: [id number]   YOB: 1931    DATE OF ADMISSION: 9/5/2021  9:44 PM    DATE OF DISCHARGE: 9/7/2021    PRIMARY CARE PROVIDER: Amos Ellis MD       DISCHARGING PHYSICIAN: Glendy Andrade MD    To contact this individual call [phone] and ask the  to page. If unavailable ask to be transferred the Adult Hospitalist Department. DISCHARGE DIAGNOSES  Left sided numbness. CONSULTATIONS: IP CONSULT TO HOSPITALIST  IP CONSULT TO NEUROLOGY  IP CONSULT TO NEUROINTERVENTIONAL SURGERY    PROCEDURES/SURGERIES: * No surgery found *    PENDING TEST RESULTS:   At the time of discharge the following test results are still pending:    FOLLOW UP APPOINTMENTS:   Follow-up Information     Follow up With Specialties Details Why Contact Info    Amos Ellis MD [address]  [phone]             ADDITIONAL CARE RECOMMENDATIONS:   Please follow up with your primary care provider in 1 to 2 weeks. Please follow up with       DIET: Cardiac Diet    ACTIVITY: Activity as tolerated          DISCHARGE MEDICATIONS:   See Medication Reconciliation Form    · It is important that you take the medication exactly as they are prescribed. · Keep your medication in the bottles provided by the pharmacist and keep a list of the medication names, dosages, and times to be taken in your wallet. · Do not take other medications without consulting your doctor. NOTIFY YOUR PHYSICIAN FOR ANY OF THE FOLLOWING:   Fever over 101 degrees for 24 hours. Chest pain, shortness of breath, fever, chills, nausea, vomiting, diarrhea, change in mentation, falling, weakness, bleeding. Severe pain or pain not relieved by medications. Or, any other signs or symptoms that you may have questions about.       DISPOSITION:    Home With:  X OT X PT  HH  RN       SNF/Inpatient Rehab/LTAC    Independent/assisted living Hospice    Other:     CDMP Checked:    Yes X       Signed:   Randy Weiss MD  9/7/2021  4:35 PM

## 2021-09-07 NOTE — PROGRESS NOTES
Transition of Care Plan   RUR- 10% Low Risk   DISPOSITION: The disposition plan is home with family assistance.  Patient is currently declining HHPT/OT.   F/U with PCP/Specialist     Transport: Family - William - Galindo Howard - [phone]    Patient has been recommended for HHPT/OT. At 12:00pm - CM met with patient at bedside and attempted to provide her with a HHPT/OT list.    Patient reports, \"I am not doing any therapy, my daughter is going to come get me when it's time for me to go back home. \"    Patient is currently awaiting an ECHO to be completed. CM will provide updates as they become available. CM will continue to assist with SABIHA needs as they arise.     Eve Brown, MSW, CRM, LMHP-e

## 2021-09-07 NOTE — PROGRESS NOTES
Hospitalist Progress Note          Brooke Meeks MD  Please call  and page for questions. Call physician on-call through the  7pm-7am    Daily Progress Note: 9/7/2021    Primary care provider:Rosemary Saunders MD    Date of admission: 9/5/2021  9:44 PM    Admission summery and hospital course:  80 y. o. female with past medical history of hypertension, GERD, chronic constipation, dyslipidemia who presents to hospital with complaints of left-sided weakness and numbness.   When questioned about complete loss of sensation, she states she is unable to fully describe the sensation but felt unusual. Andre Staley has had these episodes intermittently in the soles of her feet. Reports history of TIA about 6 months ago and had normal work-up and received. Andre Staley has had no recent travel or sick contacts. Vitals on ER presentation remarkable for BP of 158/75. Labs overall unremarkable and CT head and CTA head and neck showed no acute pathology. Teleneurology evaluated patient and recommended admission for MRI and EEG. Subjective:   Patient said she is doing fine and she waiting for the cardiac echo. She denies any acute issues. States she has been walking with therapist as appropriately. She said she is doing good for the 24-year-old lady. Assessment/Plan:   Left-sided numbness  MRI brain: No acute intracranial abnormality, follow TTE report. Appreciated neurologist input. PT OT recommended for home health.  has been consulted. Continue aspirin, Plavix, atorvastatin.        Hypertension  Allow premissive HTN, blood pressure is reasonable now. Continue amlodipine  And lisinopril. Gastroesophageal reflux disease   Continue Protonix      Drinks 2-3 beers daily. Monitor for withdrawal symptoms and initiate CIWA protocol if indicated. No signs and symptoms of withdrawal now.     See orders for other plans. VTE prophylaxis: SCDs.   Code status: Full code.  Discussed plan of care with Patient/Family and Nurse. Pre-admission lived at home. Discharge planning: Discharge to home with home health after the echo report if normal.         Review of Systems:     Review of Systems:  Symptom  Y/N  Comments   Symptom  Y/N  Comments    Fever/Chills  n    Chest Pain  n    Poor Appetite   n   Edema  n     Cough  n   Abdominal Pain  n     Sputum  n   Joint Pain  n    SOB/OVIEDO  n   Pruritis/Rash      Nausea/vomit  n   Tolerating PT/OT      Diarrhea     Tolerating Diet      Constipation     Other      Could not obtain due to:         Objective:   Physical Exam:     Visit Vitals  BP (!) 140/65   Pulse 93   Temp 98.8 °F (37.1 °C)   Resp 21   Ht 5' 1\" (1.549 m)   Wt 56.7 kg (125 lb)   SpO2 97%   BMI 23.62 kg/m²      O2 Device: None (Room air)    Temp (24hrs), Av.5 °F (36.9 °C), Min:98.1 °F (36.7 °C), Max:98.8 °F (37.1 °C)    No intake/output data recorded.  1901 -  0700  In: -   Out: 1250 [Urine:1250]      General:  Alert, cooperative, no distress, appears stated age. Patient is comfortably laying in the chair. Lungs:   Clear to auscultation bilaterally. Chest wall:  No tenderness or deformity. Heart:  Regular rate and rhythm, S1, S2 normal, no murmur. Abdomen:   Soft, non-tender. Bowel sounds normal. ganomegaly. Extremities: Extremities normal, atraumatic, no cyanosis or edema. Neurologic:  Patient has clear voice, she interacts appropriately. Patient follows command.        Data Review:       Recent Days:  Recent Labs     21  04421   WBC 8.3 7.4   HGB 14.4 13.0   HCT 45.5 39.4    264     Recent Labs     21  0440 21  0500 210     --  139   K 4.1  --  4.6   *  --  110*   CO2 26  --  22   *  --  94   BUN 9  --  10   CREA 0.74  --  0.69   CA 8.7  --  8.2*   MG  --  2.1  --    PHOS  --  3.0  --    ALB  --   --  3.3*   ALT  --   --  23   INR  --   --  1.0     No results for input(s): PH, PCO2, PO2, HCO3, FIO2 in the last 72 hours. 24 Hour Results:  Recent Results (from the past 24 hour(s))   ASPIRIN TEST    Collection Time: 09/06/21  5:17 PM   Result Value Ref Range    Aspirin test 503 ARU   PLATELET FUNCTION, VERIFY NOW P2Y12    Collection Time: 09/06/21  5:17 PM   Result Value Ref Range    P2Y12 Plt response 58 (L) 194 - 418 PRU   CBC WITH AUTOMATED DIFF    Collection Time: 09/07/21  4:40 AM   Result Value Ref Range    WBC 8.3 3.6 - 11.0 K/uL    RBC 4.87 3.80 - 5.20 M/uL    HGB 14.4 11.5 - 16.0 g/dL    HCT 45.5 35.0 - 47.0 %    MCV 93.4 80.0 - 99.0 FL    MCH 29.6 26.0 - 34.0 PG    MCHC 31.6 30.0 - 36.5 g/dL    RDW 14.6 (H) 11.5 - 14.5 %    PLATELET 884 643 - 139 K/uL    MPV 9.3 8.9 - 12.9 FL    NRBC 0.0 0  WBC    ABSOLUTE NRBC 0.00 0.00 - 0.01 K/uL    NEUTROPHILS 67 32 - 75 %    LYMPHOCYTES 22 12 - 49 %    MONOCYTES 7 5 - 13 %    EOSINOPHILS 3 0 - 7 %    BASOPHILS 1 0 - 1 %    IMMATURE GRANULOCYTES 0 0.0 - 0.5 %    ABS. NEUTROPHILS 5.5 1.8 - 8.0 K/UL    ABS. LYMPHOCYTES 1.8 0.8 - 3.5 K/UL    ABS. MONOCYTES 0.6 0.0 - 1.0 K/UL    ABS. EOSINOPHILS 0.3 0.0 - 0.4 K/UL    ABS. BASOPHILS 0.1 0.0 - 0.1 K/UL    ABS. IMM.  GRANS. 0.0 0.00 - 0.04 K/UL    DF AUTOMATED     METABOLIC PANEL, BASIC    Collection Time: 09/07/21  4:40 AM   Result Value Ref Range    Sodium 141 136 - 145 mmol/L    Potassium 4.1 3.5 - 5.1 mmol/L    Chloride 111 (H) 97 - 108 mmol/L    CO2 26 21 - 32 mmol/L    Anion gap 4 (L) 5 - 15 mmol/L    Glucose 116 (H) 65 - 100 mg/dL    BUN 9 6 - 20 MG/DL    Creatinine 0.74 0.55 - 1.02 MG/DL    BUN/Creatinine ratio 12 12 - 20      GFR est AA >60 >60 ml/min/1.73m2    GFR est non-AA >60 >60 ml/min/1.73m2    Calcium 8.7 8.5 - 10.1 MG/DL   ECHO ADULT COMPLETE    Collection Time: 09/07/21  3:56 PM   Result Value Ref Range    IVSd 0.94 (A) 0.60 - 0.90 cm    LVIDd 3.15 (A) 3.90 - 5.30 cm    LVIDs 2.39 cm    LVOT d 1.80 cm    LVPWd 0.99 (A) 0.60 - 0.90 cm    LVOT Peak Gradient 4.70 mmHg LVOT Peak Gradient 3.47 mmHg    LVOT SV 51.0 mL    LVOT Peak Velocity 93.11 cm/s    LVOT Peak Velocity 108.41 cm/s    LVOT VTI 20.03 cm    RVIDd 4.42 cm    Left Atrium Major Axis 4.83 cm    LA Volume 75.42 22.0 - 52.0 mL    LA Area 4C 20.85 cm2    LA Vol 2C 85.99 (A) 22.00 - 52.00 mL    LA Vol 4C 55.79 (A) 22.00 - 52.00 mL    Aortic Valve Area by Continuity of Peak Velocity 1.02 cm2    Aortic Valve Area by Continuity of Peak Velocity 1.19 cm2    Aortic Valve Area by Continuity of Peak Velocity 1.14 cm2    Aortic Valve Area by Continuity of Peak Velocity 0.98 cm2    Aortic Valve Area by Continuity of VTI 1.13 cm2    AoV PG 23.57 mmHg    AoV PG 21.61 mmHg    Aortic Valve Systolic Mean Gradient 57.22 mmHg    Aortic Valve Systolic Peak Velocity 617.50 cm/s    Aortic Valve Systolic Peak Velocity 683.00 cm/s    AoV VTI 45.09 cm    MV A Augustus 163.09 cm/s    Mitral Valve E Wave Deceleration Time 353.71 ms    MV E Augustus 111.91 cm/s    E/E' lateral 15.80     E/E' septal 29.61     Mitral Valve Pressure Half-time 102.58 ms    MVA (PHT) 2.14 cm2    Pulmonic Valve Systolic Peak Instantaneous Gradient 3.98 mmHg    Tapse 2.31 (A) 1.50 - 2.00 cm    Triscuspid Valve Regurgitation Peak Gradient 33.08 mmHg    TR Max Velocity 287.56 cm/s    Ao Root D 2.86 cm       Problem List:  Problem List as of 9/7/2021 Date Reviewed: 3/23/2019        Codes Class Noted - Resolved    Left sided numbness ICD-10-CM: R20.0  ICD-9-CM: 782.0  9/6/2021 - Present        Humerus distal fracture ICD-10-CM: S42.409A  ICD-9-CM: 812.40  4/25/2019 - Present        Essential hypertension ICD-10-CM: I10  ICD-9-CM: 401.9  9/29/2017 - Present        Daytime somnolence ICD-10-CM: R40.0  ICD-9-CM: 780.54  5/8/2015 - Present        DDD (degenerative disc disease) ICD-10-CM: [id number]  ICD-9-CM: 722.6  5/8/2015 - Present        Hyperlipidemia ICD-10-CM: E78.5  ICD-9-CM: 272.4  5/8/2015 - Present              Medications reviewed  Current Facility-Administered Medications Medication Dose Route Frequency    lisinopriL (PRINIVIL, ZESTRIL) tablet 20 mg  20 mg Oral DAILY    amLODIPine (NORVASC) tablet 5 mg  5 mg Oral DAILY    pantoprazole (PROTONIX) tablet 20 mg  20 mg Oral ACB    docusate (COLACE) 50 mg/5 mL oral liquid 50 mg  50 mg Oral DAILY    acetaminophen (TYLENOL) tablet 650 mg  650 mg Oral Q4H PRN    Or    acetaminophen (TYLENOL) solution 650 mg  650 mg Per NG tube Q4H PRN    Or    acetaminophen (TYLENOL) suppository 650 mg  650 mg Rectal Q4H PRN    aspirin chewable tablet 81 mg  81 mg Oral DAILY    clopidogreL (PLAVIX) tablet 75 mg  75 mg Oral DAILY    atorvastatin (LIPITOR) tablet 40 mg  40 mg Oral DAILY    0.9% sodium chloride infusion  75 mL/hr IntraVENous CONTINUOUS    hydrALAZINE (APRESOLINE) 20 mg/mL injection 10 mg  10 mg IntraVENous Q6H PRN       Care Plan discussed with: Patient/Family, Nurse and     Total time spent with patient: 30 minutes.     Talita Hamilton MD

## 2021-09-07 NOTE — PROGRESS NOTES
Problem: Falls - Risk of  Goal: *Absence of Falls  Description: Document Sarahi Ernst Fall Risk and appropriate interventions in the flowsheet.   Outcome: Progressing Towards Goal  Note: Fall Risk Interventions:  Mobility Interventions: Assess mobility with egress test, Communicate number of staff needed for ambulation/transfer, OT consult for ADLs, Patient to call before getting OOB, PT Consult for mobility concerns, PT Consult for assist device competence, Utilize walker, cane, or other assistive device         Medication Interventions: Assess postural VS orthostatic hypotension, Patient to call before getting OOB, Teach patient to arise slowly    Elimination Interventions: Call light in reach, Patient to call for help with toileting needs, Stay With Me (per policy)

## 2021-09-07 NOTE — PROGRESS NOTES
Problem: Discharge Planning  Goal: *Discharge to safe environment  Outcome: Progressing Towards Goal     Problem: Falls - Risk of  Goal: *Absence of Falls  Description: Document Jyoti Renee Fall Risk and appropriate interventions in the flowsheet. Outcome: Progressing Towards Goal  Note: Fall Risk Interventions:  Mobility Interventions: Patient to call before getting OOB         Medication Interventions: Teach patient to arise slowly, Patient to call before getting OOB    Elimination Interventions: Call light in reach         Problem: Patient Education: Go to Patient Education Activity  Goal: Patient/Family Education  Outcome: Progressing Towards Goal     Problem: Patient Education: Go to Patient Education Activity  Goal: Patient/Family Education  Outcome: Progressing Towards Goal     Problem: Pressure Injury - Risk of  Goal: *Prevention of pressure injury  Description: Document Gurinder Scale and appropriate interventions in the flowsheet. Outcome: Progressing Towards Goal  Note: Pressure Injury Interventions:             Activity Interventions: Increase time out of bed, Pressure redistribution bed/mattress(bed type), PT/OT evaluation    Mobility Interventions: Pressure redistribution bed/mattress (bed type)    Nutrition Interventions: Document food/fluid/supplement intake              Problem: Patient Education: Go to Patient Education Activity  Goal: Patient/Family Education  Outcome: Progressing Towards Goal     Problem: General Medical Care Plan  Goal: *Vital signs within specified parameters  Outcome: Progressing Towards Goal  Goal: *Labs within defined limits  Outcome: Progressing Towards Goal  Goal: *Absence of infection signs and symptoms  Outcome: Progressing Towards Goal  Goal: *Optimal pain control at patient's stated goal  Outcome: Progressing Towards Goal  Goal: *Skin integrity maintained  Outcome: Progressing Towards Goal  Goal: *Fluid volume balance  Outcome: Progressing Towards Goal  Goal: *Optimize nutritional status  Outcome: Progressing Towards Goal  Goal: *Anxiety reduced or absent  Outcome: Progressing Towards Goal  Goal: *Progressive mobility and function (eg: ADL's)  Outcome: Progressing Towards Goal     Problem: Patient Education: Go to Patient Education Activity  Goal: Patient/Family Education  Outcome: Progressing Towards Goal

## 2021-09-07 NOTE — CONSULTS
Neurology Progress Note    Patient ID:  Adrián Alonso  [id number]  80 y.o.  7/4/1931    Chief Complaint: Left-sided numbness    Subjective:     68-year-old woman who presented with left-sided symptoms worse in the foot described as numbness possibly some heaviness initially now improved. MRI final read still pending but I do not see any acute stroke. Neuro interventional saw her yesterday out of concern for acute vascular process in the YOBANY. She does have stenosis but there is no intervention to offer. I have her on dual antiplatelets which are both therapeutic. Today she feels well she tells me. She does however report she heard classical music in the middle of the night. No headache. Ambulating. Objective:       ROS:  Per HPI  All other 12 pt ROS negative    Meds:  Current Facility-Administered Medications   Medication Dose Route Frequency    lisinopriL (PRINIVIL, ZESTRIL) tablet 20 mg  20 mg Oral DAILY    amLODIPine (NORVASC) tablet 5 mg  5 mg Oral DAILY    pantoprazole (PROTONIX) tablet 20 mg  20 mg Oral ACB    docusate (COLACE) 50 mg/5 mL oral liquid 50 mg  50 mg Oral DAILY    acetaminophen (TYLENOL) tablet 650 mg  650 mg Oral Q4H PRN    Or    acetaminophen (TYLENOL) solution 650 mg  650 mg Per NG tube Q4H PRN    Or    acetaminophen (TYLENOL) suppository 650 mg  650 mg Rectal Q4H PRN    aspirin chewable tablet 81 mg  81 mg Oral DAILY    clopidogreL (PLAVIX) tablet 75 mg  75 mg Oral DAILY    atorvastatin (LIPITOR) tablet 40 mg  40 mg Oral DAILY    0.9% sodium chloride infusion  75 mL/hr IntraVENous CONTINUOUS    hydrALAZINE (APRESOLINE) 20 mg/mL injection 10 mg  10 mg IntraVENous Q6H PRN       MRI Results (maximum last 3): Results from [place] encounter on 09/05/21    MRI BRAIN WO CONT    Narrative  *PRELIMINARY REPORT*    1.  No acute intracranial abnormality.   2.  Mild white matter disease may reflect chronic microangiopathic change    Preliminary report was provided by Dr. Go Jhaveri, the on-call radiologist, at [facility]  hours 9/6/2021    Final report to follow. *END PRELIMINARY REPORT*      Results from [facility] encounter on 10/31/12    MRI CERV SPINE WO CONT    Narrative  **Final Report**      ICD Codes / Adm. Diagnosis: 723.1   / Cervicalgia  Examination:  MRI C SPINE WO CON  - [id number] - Oct 31 2012  1:57PM  Accession No:  [id number]  Reason:  NECK PAIN , R/O HNP      REPORT:  Indication: Neck pain    Exam: MRI cervical spine. Comparisons: none    Sequences: Sagittal T1, T2, and STIR. Axial T1, gradient echo. No contrast.    FINDINGS: Alignment on the sagittal images is normal.  There are endplate  degenerative changes. The visualized posterior fossa and cord signal are  normal. Paraspinous soft tissues are within normal limits. C2-C3: There are bilateral uncovertebral degenerative changes slightly  narrowing the foramen    C3-C4: There are left-sided uncovertebral degenerative changes and facet  degenerative change. There are right-sided uncovertebral degenerative  changes. There is mild to moderate left foraminal narrowing    C4-C5: There is a slight disc osteophytic bulge with uncovertebral  degenerative change left greater than right and left facet arthropathy. There is borderline canal and mild left foraminal narrowing    C5-C6: There is a diffuse disc osteophytic bulge with bilateral  uncovertebral degenerative change. There is mild narrowing of the canal.  There is moderate right greater than left foraminal narrowing    C6-C7: There is a diffuse disc osteophytic bulge with bilateral  uncovertebral degenerative change. Canal stenosis is mild with moderate  bilateral foraminal narrowing    Examination of the upper thoracic spine demonstrates no significant stenosis.       IMPRESSION:  1. Multilevel degenerative change detailed by level above most significant  at C6-C7            Signing/Reading Doctor: Yaneli Redding ([id number])  Approved: Yaneli Redding ([id number])  10/31/2012      Lab Review   Recent Results (from the past 24 hour(s))   ASPIRIN TEST    Collection Time: 09/06/21  5:17 PM   Result Value Ref Range    Aspirin test 503 ARU   PLATELET FUNCTION, VERIFY NOW P2Y12    Collection Time: 09/06/21  5:17 PM   Result Value Ref Range    P2Y12 Plt response 58 (L) 194 - 418 PRU   CBC WITH AUTOMATED DIFF    Collection Time: 09/07/21  4:40 AM   Result Value Ref Range    WBC 8.3 3.6 - 11.0 K/uL    RBC 4.87 3.80 - 5.20 M/uL    HGB 14.4 11.5 - 16.0 g/dL    HCT 45.5 35.0 - 47.0 %    MCV 93.4 80.0 - 99.0 FL    MCH 29.6 26.0 - 34.0 PG    MCHC 31.6 30.0 - 36.5 g/dL    RDW 14.6 (H) 11.5 - 14.5 %    PLATELET 256 091 - 929 K/uL    MPV 9.3 8.9 - 12.9 FL    NRBC 0.0 0  WBC    ABSOLUTE NRBC 0.00 0.00 - 0.01 K/uL    NEUTROPHILS 67 32 - 75 %    LYMPHOCYTES 22 12 - 49 %    MONOCYTES 7 5 - 13 %    EOSINOPHILS 3 0 - 7 %    BASOPHILS 1 0 - 1 %    IMMATURE GRANULOCYTES 0 0.0 - 0.5 %    ABS. NEUTROPHILS 5.5 1.8 - 8.0 K/UL    ABS. LYMPHOCYTES 1.8 0.8 - 3.5 K/UL    ABS. MONOCYTES 0.6 0.0 - 1.0 K/UL    ABS. EOSINOPHILS 0.3 0.0 - 0.4 K/UL    ABS. BASOPHILS 0.1 0.0 - 0.1 K/UL    ABS. IMM. GRANS. 0.0 0.00 - 0.04 K/UL    DF AUTOMATED     METABOLIC PANEL, BASIC    Collection Time: 09/07/21  4:40 AM   Result Value Ref Range    Sodium 141 136 - 145 mmol/L    Potassium 4.1 3.5 - 5.1 mmol/L    Chloride 111 (H) 97 - 108 mmol/L    CO2 26 21 - 32 mmol/L    Anion gap 4 (L) 5 - 15 mmol/L    Glucose 116 (H) 65 - 100 mg/dL    BUN 9 6 - 20 MG/DL    Creatinine 0.74 0.55 - 1.02 MG/DL    BUN/Creatinine ratio 12 12 - 20      GFR est AA >60 >60 ml/min/1.73m2    GFR est non-AA >60 >60 ml/min/1.73m2    Calcium 8.7 8.5 - 10.1 MG/DL       Additional comments:I reviewed the patient's new clinical lab test results. and I reviewed the patients new imaging test results.      Patient Vitals for the past 8 hrs:   BP Temp Pulse Resp SpO2 Weight   09/07/21 0604 -- -- 99 -- -- --   09/07/21 0600 (!) 139/93 98.5 °F (36.9 °C) 96 20 98 % --   09/07/21 0409 -- -- 97 -- -- --   09/07/21 0214 -- -- 83 -- -- --   09/07/21 0200 (!) 133/107 98.5 °F (36.9 °C) 86 14 97 % 125 lb 10.6 oz (57 kg)       No intake/output data recorded. 09/05 1901 - 09/07 0700  In: -   Out: 1250 [Urine:1250]    Exam:  Visit Vitals  BP (!) 139/93   Pulse 99   Temp 98.5 °F (36.9 °C)   Resp 20   Ht 5' 1\" (1.549 m)   Wt 125 lb 10.6 oz (57 kg)   SpO2 98%   BMI 23.74 kg/m²     Gen: Well developed  CV: RRR  Lungs: non labored breathing  Abd: soft, non distended  Neuro: Awake and alert and pleasant. Eating a banana. CN II-XII:  face grossly symmetric, tongue/palate midline  Motor: Moving all extremities well seated with purpose  Sensory: intact to LT  DTRs: symmetric  COOR: no limb/truncal ataxia  Gait: Steady independent cautious    PROBLEM LIST:     Patient Active Problem List   Diagnosis Code    Daytime somnolence R40.0    DDD (degenerative disc disease) [id number]    Hyperlipidemia E78.5    Essential hypertension I10    Humerus distal fracture S42.409A    Left sided numbness R20.0       Assessment/Plan:      35-year-old woman whom I suspect may have had some stuttering TIA-like symptoms due to [address] territory vascular disease. She is going to stay on dual antiplatelets both of which are therapeutic. Avoid hypotension as that could aggravate the poor flow in that territory. Complete stroke work-up. Statin ongoing. Any rehab needs to be addressed. During this evaluation, we also discussed stroke education to include signs and symptoms of stroke and TIA. This clinical note was dictated with an electronic dictation software that can make unintentional errors. If there are any questions, please contact me directly for clarification.       Signed:  César Tsai DO  9/7/2021  8:55 AM

## 2021-09-07 NOTE — DISCHARGE SUMMARY
Discharge Summary       PATIENT ID: Yulisa Wolfe  MRN: [id number]   YOB: 1931    DATE OF ADMISSION: 9/5/2021  9:44 PM    DATE OF DISCHARGE:   PRIMARY CARE PROVIDER: Jarred Blanchard MD       DISCHARGING PHYSICIAN: Kiarra Fay MD    To contact this individual call [phone] and ask the  to page. If unavailable ask to be transferred the Adult Hospitalist Department. CONSULTATIONS: IP CONSULT TO HOSPITALIST  IP CONSULT TO NEUROLOGY  IP CONSULT TO NEUROINTERVENTIONAL SURGERY    PROCEDURES/SURGERIES: * No surgery found *    ADMITTING DIAGNOSES & HOSPITAL COURSE:   80 y. o. female with past medical history of hypertension, GERD, chronic constipation, dyslipidemia who presents to hospital with complaints of left-sided weakness and numbness.   When questioned about complete loss of sensation, she states she is unable to fully describe the sensation but felt unusual. Rodrigue Don has had these episodes intermittently in the soles of her feet. Reports history of TIA about 6 months ago and had normal work-up and received. Rodrigue Don has had no recent travel or sick contacts. Vitals on ER presentation remarkable for BP of 158/75. Labs overall unremarkable and CT head and CTA head and neck showed no acute pathology. Teleneurology evaluated patient and recommended admission for MRI and EEG. DISCHARGE DIAGNOSES / PLAN:      Left-sided numbness  MRI brain: No acute intracranial abnormality. TTE report with LVEF of 60-65%, normal cavity, grade 1 diastolic dysfunction, dilated left and right atrium. Appreciated neurologist input. PT OT recommended for home health.  has been consulted. Continue aspirin, Plavix, atorvastatin.        Hypertension  Allow premissive HTN, blood pressure is reasonable now. Continue amlodipine  And lisinopril.     Gastroesophageal reflux disease   Continue Protonix      Drinks 2-3 beers daily.    Monitor for withdrawal symptoms and initiate CIWA protocol if indicated.   No signs and symptoms of withdrawal now.     See orders for other plans. VTE prophylaxis: SCDs. Code status: Full code. Discussed plan of care with Patient/Family and Nurse. I called the patient daughter Ms. Alexandra Bolden at [phone] and updated her about the able. Answered all of her questions. I told her about the SANGEETHA finding with bilateral atrial enlargement and the necessity of follow-up with PCP. Pre-admission lived at home. Discharge planning: To home with family today. PENDING TEST RESULTS:   At the time of discharge the following test results are still pending: NA    FOLLOW UP APPOINTMENTS:    Follow-up Information     Follow up With Specialties Details Why Contact Info    Estella Delatorre MD Family Medicine   [address] [place] [address],[phone]                   DISCHARGE MEDICATIONS:  Current Discharge Medication List      START taking these medications    Details   aspirin 81 mg chewable tablet Take 1 Tablet by mouth daily for 30 days. Qty: 30 Tablet, Refills: 0  Start date: 9/8/2021, End date: 10/8/2021      clopidogreL (PLAVIX) 75 mg tab Take 1 Tablet by mouth daily for 30 days. Qty: 30 Tablet, Refills: 0  Start date: 9/8/2021, End date: 10/8/2021         CONTINUE these medications which have NOT CHANGED    Details   docusate sodium (COLACE) 50 mg capsule Take two tabs twice daily for one week, then twice a day as needed thereafter. Hold for loose stools. Qty: 28 Cap, Refills: 2      amLODIPine (NORVASC) 5 mg tablet Take 1 Tab by mouth daily. Qty: 90 Tab, Refills: 3    Associated Diagnoses: Essential hypertension      atorvastatin (LIPITOR) 10 mg tablet Take 1 Tab by mouth daily. Qty: 90 Tab, Refills: 3    Associated Diagnoses: Mixed hyperlipidemia      Omeprazole delayed release (PRILOSEC D/R) 20 mg tablet Take 1 Tab by mouth daily.   Qty: 90 Tab, Refills: 3    Associated Diagnoses: Gastroesophageal reflux disease without esophagitis      lisinopril (PRINIVIL, ZESTRIL) 20 mg tablet Take 1 Tab by mouth daily. Qty: 90 Tab, Refills: 3    Associated Diagnoses: Essential hypertension      ketoconazole (NIZORAL) 2 % topical cream Apply  to affected area daily. Qty: 15 g, Refills: 0    Associated Diagnoses: Rash               NOTIFY YOUR PHYSICIAN FOR ANY OF THE FOLLOWING:   Fever over 101 degrees for 24 hours. Chest pain, shortness of breath, fever, chills, nausea, vomiting, diarrhea, change in mentation, falling, weakness, bleeding. Severe pain or pain not relieved by medications. Or, any other signs or symptoms that you may have questions about. DISPOSITION:   X Home With:  X OT X PT  HH  RN       Long term SNF/Inpatient Rehab    Independent/assisted living    Hospice    Other:       PATIENT CONDITION AT DISCHARGE:     Functional status    Poor     Deconditioned    X Independent      Cognition    X Lucid     Forgetful     Dementia      Catheters/lines (plus indication)    [place]     PICC     PEG    X None      Code status   X  Full code     DNR      PHYSICAL EXAMINATION AT DISCHARGE:   Refer to Progress Note.        CHRONIC MEDICAL DIAGNOSES:  Problem List as of 9/7/2021 Date Reviewed: 3/23/2019        Codes Class Noted - Resolved    Humerus distal fracture ICD-10-CM: S42.409A  ICD-9-CM: 812.40  4/25/2019 - Present        Essential hypertension ICD-10-CM: I10  ICD-9-CM: 401.9  9/29/2017 - Present        Daytime somnolence ICD-10-CM: R40.0  ICD-9-CM: 780.54  5/8/2015 - Present        DDD (degenerative disc disease) ICD-10-CM: [id number]  ICD-9-CM: 722.6  5/8/2015 - Present        Hyperlipidemia ICD-10-CM: E78.5  ICD-9-CM: 272.4  5/8/2015 - Present        RESOLVED: Left sided numbness ICD-10-CM: R20.0  ICD-9-CM: 782.0  9/6/2021 - 9/7/2021              Greater than 35 minutes were spent with the patient on counseling and coordination of care    Signed:   Cuca Rod MD  9/7/2021  4:36 PM

## 2022-03-18 PROBLEM — S42.409A HUMERUS DISTAL FRACTURE: Status: ACTIVE | Noted: 2019-04-25

## 2022-03-19 PROBLEM — I10 ESSENTIAL HYPERTENSION: Status: ACTIVE | Noted: 2017-09-29

## 2024-12-12 ENCOUNTER — HOSPITAL ENCOUNTER (EMERGENCY)
Facility: HOSPITAL | Age: 88
Discharge: HOME | End: 2024-12-13
Attending: EMERGENCY MEDICINE
Payer: MEDICARE

## 2024-12-12 ENCOUNTER — APPOINTMENT (OUTPATIENT)
Facility: HOSPITAL | Age: 88
End: 2024-12-12
Payer: MEDICARE

## 2024-12-12 VITALS
HEIGHT: 60 IN | RESPIRATION RATE: 16 BRPM | BODY MASS INDEX: 20.47 KG/M2 | HEART RATE: 74 BPM | WEIGHT: 104.28 LBS | TEMPERATURE: 98.1 F | DIASTOLIC BLOOD PRESSURE: 87 MMHG | SYSTOLIC BLOOD PRESSURE: 135 MMHG | OXYGEN SATURATION: 98 %

## 2024-12-12 DIAGNOSIS — S81.819A SKIN TEAR OF LOWER LEG WITHOUT COMPLICATION, UNSPECIFIED LATERALITY, INITIAL ENCOUNTER: Primary | ICD-10-CM

## 2024-12-12 PROCEDURE — 73620 X-RAY EXAM OF FOOT: CPT

## 2024-12-12 PROCEDURE — 99284 EMERGENCY DEPT VISIT MOD MDM: CPT

## 2024-12-12 ASSESSMENT — PAIN DESCRIPTION - DESCRIPTORS: DESCRIPTORS: THROBBING

## 2024-12-12 ASSESSMENT — PAIN - FUNCTIONAL ASSESSMENT: PAIN_FUNCTIONAL_ASSESSMENT: 0-10

## 2024-12-12 ASSESSMENT — PAIN DESCRIPTION - PAIN TYPE: TYPE: ACUTE PAIN

## 2024-12-12 ASSESSMENT — PAIN DESCRIPTION - ORIENTATION: ORIENTATION: LEFT

## 2024-12-12 ASSESSMENT — PAIN SCALES - GENERAL: PAINLEVEL_OUTOF10: 6

## 2024-12-12 ASSESSMENT — PAIN DESCRIPTION - LOCATION: LOCATION: FOOT

## 2024-12-13 PROCEDURE — 90714 TD VACC NO PRESV 7 YRS+ IM: CPT | Performed by: EMERGENCY MEDICINE

## 2024-12-13 PROCEDURE — 6360000002 HC RX W HCPCS: Performed by: EMERGENCY MEDICINE

## 2024-12-13 PROCEDURE — 6370000000 HC RX 637 (ALT 250 FOR IP): Performed by: EMERGENCY MEDICINE

## 2024-12-13 PROCEDURE — 90471 IMMUNIZATION ADMIN: CPT | Performed by: EMERGENCY MEDICINE

## 2024-12-13 RX ORDER — GINSENG 100 MG
CAPSULE ORAL
Status: COMPLETED | OUTPATIENT
Start: 2024-12-13 | End: 2024-12-13

## 2024-12-13 RX ADMIN — BACITRACIN 1 EACH: 500 OINTMENT TOPICAL at 00:15

## 2024-12-13 RX ADMIN — Medication 3 ML: at 00:08

## 2024-12-13 RX ADMIN — CLOSTRIDIUM TETANI TOXOID ANTIGEN (FORMALDEHYDE INACTIVATED) AND CORYNEBACTERIUM DIPHTHERIAE TOXOID ANTIGEN (FORMALDEHYDE INACTIVATED) 0.5 ML: 5; 2 INJECTION, SUSPENSION INTRAMUSCULAR at 00:08

## 2024-12-13 NOTE — ED NOTES
RN irrigated the pts laceration with sterile water. A non-adherent dressing was applied and Tegaderm was wrapped around pt's injury.

## 2024-12-13 NOTE — ED NOTES
RN called [facility] and spoke with yareli Walker. She was informed that the pt did not have any fracture and did not receive stiches. The pt's ETA back home currently is 0445.

## 2024-12-13 NOTE — ED TRIAGE NOTES
Pt from [facility] , was opening a drawer and the drawer fell and cut left foot. Patient noted to have dressing to L foot.bleeding controlled. No blood thinners.   Unsure of when last teatnus was .

## 2024-12-13 NOTE — ED PROVIDER NOTES
[facility] EMERGENCY DEP  EMERGENCY DEPARTMENT ENCOUNTER      Pt Name: Lynda Gagnon  MRN: [id number]  Birthdate 7/4/1931  Date of evaluation: 12/12/2024  Provider: Makenna Dela Cruz MD    CHIEF COMPLAINT       Chief Complaint   Patient presents with    Laceration         HISTORY OF PRESENT ILLNESS   (Location/Symptom, Timing/Onset, Context/Setting, Quality, Duration, Modifying Factors, Severity)  Note limiting factors.   Patient is a 89-year-old female with history of CAD, hemorrhoids, GERD, insomnia, osteoporosis who presents with laceration from nursing home.  Patient reports that she was trying to open her dresser when door fell on her foot.  Denies any head trauma, no blood thinner use.  Patient has no complaints at this time, will get her left foot relief.  Skin tear noted to extremity.        Nursing Notes were reviewed.    REVIEW OF SYSTEMS    Not Required     Review of Systems    PAST MEDICAL HISTORY     Past Medical History:   Diagnosis Date    Acute myocardial infarction of other inferior wall, episode of care unspecified     Allergic rhinitis, cause unspecified     Brachial neuritis or radiculitis NOS     Cancer (HCC)     r arm basal cell    Cervicalgia     Disorder of bone and cartilage, unspecified     GERD (gastroesophageal reflux disease)     Hemorrhoid     Hypercholesterolemia     Incontinence of urine     Insomnia, unspecified     Leaky heart valve     Osteoarthrosis, unspecified whether generalized or localized, unspecified site     Osteoporosis, unspecified     Other abnormal blood chemistry     Pain in joint, shoulder region     r shoulder, pelvic, thigh, l hip, l forearm    Unspecified essential hypertension     Unspecified transient cerebral ischemia     Unspecified vitamin D deficiency     UTI (urinary tract infection)        SURGICAL HISTORY       Past Surgical History:   Procedure Laterality Date    BUNIONECTOMY      CATARACT REMOVAL Bilateral     with L implant, R eye hard contact     versus abrasion is on the differential    No acute abnormalities noted on x-ray today.  Skin tear dressed not applicable dressing, does not need sutures.  Patient is alert oriented answer questions appropriately, denies any complaints.  Stable for discharge.    Amount and/or Complexity of Data Reviewed  Radiology: ordered.    Risk  OTC drugs.  Prescription drug management.        EKG: All EKG's are interpreted by the Emergency Department Physician who either signs or Co-signs this chart in the absence of a cardiologist.    ED Course as of 12/13/24 0736   Fri Dec 13, 2024   0043 I have independently viewed the obtained radiographic images and note xr without acute findings. Will await radiology read.    [AL]      ED Course User Index  [AL] Makenna Dela Cruz MD       CRITICAL CARE TIME   Total Critical Care time was 0 minutes, excluding separately reportable procedures. There was a high probability of clinically significant/life threatening deterioration in the patient's condition with required my urgent intervention.     CONSULTS:  None    PROCEDURES:  Unless otherwise noted below, none     Procedures    FINAL IMPRESSION      1. Skin tear of lower leg without complication, unspecified laterality, initial encounter          DISPOSITION/PLAN   DISPOSITION Decision To Discharge 12/13/2024 03:21:25 AM    PATIENT REFERRED TO:  [facility] EMERGENCY DEP  [facility] [facility] [address] [phone]    As needed    Quentin Wills MD  [address] [place] [id number] [phone]    Schedule an appointment as soon as possible for a visit in 3 days  for symptom recheck      DISCHARGE MEDICATIONS:  Discharge Medication List as of 12/13/2024  4:23 AM        Patient's results have been reviewed with them.  Patient and/or family have verbally conveyed their understanding and agreement of the patient's signs, symptoms, diagnosis, treatment and prognosis and additionally agree to follow up as recommended or return to

## 2024-12-26 ENCOUNTER — APPOINTMENT (OUTPATIENT)
Facility: HOSPITAL | Age: 88
DRG: 084 | End: 2024-12-26
Payer: MEDICARE

## 2024-12-26 ENCOUNTER — HOSPITAL ENCOUNTER (INPATIENT)
Facility: HOSPITAL | Age: 88
LOS: 3 days | Discharge: NURSING, REHAB OR LONG-TERM CARE | DRG: 084 | End: 2024-12-30
Attending: EMERGENCY MEDICINE | Admitting: INTERNAL MEDICINE
Payer: MEDICARE

## 2024-12-26 DIAGNOSIS — I62.00 SUBDURAL HEMORRHAGE (HCC): ICD-10-CM

## 2024-12-26 DIAGNOSIS — R29.6 UNWITNESSED FALL: ICD-10-CM

## 2024-12-26 DIAGNOSIS — M79.605 LEFT LEG PAIN: ICD-10-CM

## 2024-12-26 DIAGNOSIS — I60.9 SUBARACHNOID HEMORRHAGE (HCC): Primary | ICD-10-CM

## 2024-12-26 DIAGNOSIS — S09.90XA CLOSED HEAD INJURY, INITIAL ENCOUNTER: ICD-10-CM

## 2024-12-26 LAB
ALBUMIN SERPL-MCNC: 3.6 G/DL (ref 3.5–5)
ALBUMIN/GLOB SERPL: 1.4 (ref 1.1–2.2)
ALP SERPL-CCNC: 82 U/L (ref 45–117)
ALT SERPL-CCNC: 23 U/L (ref 12–78)
ANION GAP SERPL CALC-SCNC: 5 MMOL/L (ref 2–12)
AST SERPL-CCNC: 29 U/L (ref 15–37)
BASOPHILS # BLD: 0.1 K/UL (ref 0–0.1)
BASOPHILS NFR BLD: 0 % (ref 0–1)
BILIRUB SERPL-MCNC: 0.3 MG/DL (ref 0.2–1)
BUN SERPL-MCNC: 30 MG/DL (ref 6–20)
BUN/CREAT SERPL: 31 (ref 12–20)
CALCIUM SERPL-MCNC: 8.9 MG/DL (ref 8.5–10.1)
CHLORIDE SERPL-SCNC: 109 MMOL/L (ref 97–108)
CO2 SERPL-SCNC: 25 MMOL/L (ref 21–32)
CREAT SERPL-MCNC: 0.96 MG/DL (ref 0.55–1.02)
DIFFERENTIAL METHOD BLD: ABNORMAL
EOSINOPHIL # BLD: 0.2 K/UL (ref 0–0.4)
EOSINOPHIL NFR BLD: 2 % (ref 0–7)
ERYTHROCYTE [DISTWIDTH] IN BLOOD BY AUTOMATED COUNT: 14.1 % (ref 11.5–14.5)
GLOBULIN SER CALC-MCNC: 2.6 G/DL (ref 2–4)
GLUCOSE SERPL-MCNC: 93 MG/DL (ref 65–100)
HCT VFR BLD AUTO: 31.3 % (ref 35–47)
HGB BLD-MCNC: 9.7 G/DL (ref 11.5–16)
IMM GRANULOCYTES # BLD AUTO: 0.1 K/UL (ref 0–0.04)
IMM GRANULOCYTES NFR BLD AUTO: 0 % (ref 0–0.5)
INR PPP: 1 (ref 0.9–1.1)
LYMPHOCYTES # BLD: 1.7 K/UL (ref 0.8–3.5)
LYMPHOCYTES NFR BLD: 14 % (ref 12–49)
MAGNESIUM SERPL-MCNC: 2.4 MG/DL (ref 1.6–2.4)
MCH RBC QN AUTO: 32 PG (ref 26–34)
MCHC RBC AUTO-ENTMCNC: 31 G/DL (ref 30–36.5)
MCV RBC AUTO: 103.3 FL (ref 80–99)
MONOCYTES # BLD: 1.1 K/UL (ref 0–1)
MONOCYTES NFR BLD: 9 % (ref 5–13)
NEUTS SEG # BLD: 9.1 K/UL (ref 1.8–8)
NEUTS SEG NFR BLD: 75 % (ref 32–75)
NRBC # BLD: 0 K/UL (ref 0–0.01)
NRBC BLD-RTO: 0 PER 100 WBC
PLATELET # BLD AUTO: 279 K/UL (ref 150–400)
PMV BLD AUTO: 9.5 FL (ref 8.9–12.9)
POTASSIUM SERPL-SCNC: 4 MMOL/L (ref 3.5–5.1)
PROT SERPL-MCNC: 6.2 G/DL (ref 6.4–8.2)
PROTHROMBIN TIME: 10.5 SEC (ref 9–11.1)
RBC # BLD AUTO: 3.03 M/UL (ref 3.8–5.2)
SODIUM SERPL-SCNC: 139 MMOL/L (ref 136–145)
WBC # BLD AUTO: 12.2 K/UL (ref 3.6–11)

## 2024-12-26 PROCEDURE — 85610 PROTHROMBIN TIME: CPT

## 2024-12-26 PROCEDURE — 73502 X-RAY EXAM HIP UNI 2-3 VIEWS: CPT

## 2024-12-26 PROCEDURE — 80053 COMPREHEN METABOLIC PANEL: CPT

## 2024-12-26 PROCEDURE — 73562 X-RAY EXAM OF KNEE 3: CPT

## 2024-12-26 PROCEDURE — 36415 COLL VENOUS BLD VENIPUNCTURE: CPT

## 2024-12-26 PROCEDURE — 83735 ASSAY OF MAGNESIUM: CPT

## 2024-12-26 PROCEDURE — 93005 ELECTROCARDIOGRAM TRACING: CPT | Performed by: EMERGENCY MEDICINE

## 2024-12-26 PROCEDURE — 72125 CT NECK SPINE W/O DYE: CPT

## 2024-12-26 PROCEDURE — APPNB45 APP NON BILLABLE 31-45 MINUTES

## 2024-12-26 PROCEDURE — 6360000004 HC RX CONTRAST MEDICATION: Performed by: RADIOLOGY

## 2024-12-26 PROCEDURE — 71045 X-RAY EXAM CHEST 1 VIEW: CPT

## 2024-12-26 PROCEDURE — 73630 X-RAY EXAM OF FOOT: CPT

## 2024-12-26 PROCEDURE — 85025 COMPLETE CBC W/AUTO DIFF WBC: CPT

## 2024-12-26 PROCEDURE — 99285 EMERGENCY DEPT VISIT HI MDM: CPT

## 2024-12-26 PROCEDURE — 70450 CT HEAD/BRAIN W/O DYE: CPT

## 2024-12-26 PROCEDURE — 70496 CT ANGIOGRAPHY HEAD: CPT

## 2024-12-26 RX ORDER — IOPAMIDOL 755 MG/ML
100 INJECTION, SOLUTION INTRAVASCULAR
Status: COMPLETED | OUTPATIENT
Start: 2024-12-26 | End: 2024-12-26

## 2024-12-26 RX ORDER — IOPAMIDOL 755 MG/ML
INJECTION, SOLUTION INTRAVASCULAR
Status: DISPENSED
Start: 2024-12-26 | End: 2024-12-27

## 2024-12-26 RX ADMIN — IOPAMIDOL 100 ML: 755 INJECTION, SOLUTION INTRAVENOUS at 23:15

## 2024-12-26 ASSESSMENT — PAIN DESCRIPTION - PAIN TYPE: TYPE: CHRONIC PAIN

## 2024-12-26 ASSESSMENT — PAIN SCALES - GENERAL: PAINLEVEL_OUTOF10: 10

## 2024-12-26 ASSESSMENT — PAIN - FUNCTIONAL ASSESSMENT
PAIN_FUNCTIONAL_ASSESSMENT: 0-10
PAIN_FUNCTIONAL_ASSESSMENT: ACTIVITIES ARE NOT PREVENTED

## 2024-12-26 ASSESSMENT — PAIN DESCRIPTION - ONSET: ONSET: ON-GOING

## 2024-12-26 ASSESSMENT — PAIN DESCRIPTION - FREQUENCY: FREQUENCY: CONTINUOUS

## 2024-12-26 ASSESSMENT — PAIN DESCRIPTION - LOCATION: LOCATION: LEG

## 2024-12-26 ASSESSMENT — PAIN DESCRIPTION - ORIENTATION: ORIENTATION: LEFT

## 2024-12-27 ENCOUNTER — APPOINTMENT (OUTPATIENT)
Facility: HOSPITAL | Age: 88
DRG: 084 | End: 2024-12-27
Payer: MEDICARE

## 2024-12-27 PROBLEM — I60.9 SUBARACHNOID HEMORRHAGE (HCC): Status: ACTIVE | Noted: 2024-12-27

## 2024-12-27 LAB
ALBUMIN SERPL-MCNC: 3.5 G/DL (ref 3.5–5)
ALBUMIN/GLOB SERPL: 1.3 (ref 1.1–2.2)
ALP SERPL-CCNC: 85 U/L (ref 45–117)
ALT SERPL-CCNC: 25 U/L (ref 12–78)
ANION GAP SERPL CALC-SCNC: 9 MMOL/L (ref 2–12)
AST SERPL-CCNC: 44 U/L (ref 15–37)
BILIRUB SERPL-MCNC: 0.4 MG/DL (ref 0.2–1)
BUN SERPL-MCNC: 22 MG/DL (ref 6–20)
BUN/CREAT SERPL: 31 (ref 12–20)
CALCIUM SERPL-MCNC: 8.8 MG/DL (ref 8.5–10.1)
CHLORIDE SERPL-SCNC: 110 MMOL/L (ref 97–108)
CO2 SERPL-SCNC: 21 MMOL/L (ref 21–32)
COMMENT:: NORMAL
CREAT SERPL-MCNC: 0.7 MG/DL (ref 0.55–1.02)
EKG ATRIAL RATE: 85 BPM
EKG DIAGNOSIS: NORMAL
EKG P AXIS: 56 DEGREES
EKG P-R INTERVAL: 142 MS
EKG Q-T INTERVAL: 366 MS
EKG QRS DURATION: 82 MS
EKG QTC CALCULATION (BAZETT): 435 MS
EKG R AXIS: -29 DEGREES
EKG T AXIS: 40 DEGREES
EKG VENTRICULAR RATE: 85 BPM
FERRITIN SERPL-MCNC: 63 NG/ML (ref 26–388)
FOLATE SERPL-MCNC: 7.5 NG/ML (ref 5–21)
GLOBULIN SER CALC-MCNC: 2.8 G/DL (ref 2–4)
GLUCOSE BLD STRIP.AUTO-MCNC: 127 MG/DL (ref 65–117)
GLUCOSE BLD STRIP.AUTO-MCNC: 157 MG/DL (ref 65–117)
GLUCOSE SERPL-MCNC: 85 MG/DL (ref 65–100)
IRON SATN MFR SERPL: 18 % (ref 20–50)
IRON SERPL-MCNC: 52 UG/DL (ref 35–150)
MAGNESIUM SERPL-MCNC: 2.5 MG/DL (ref 1.6–2.4)
PHOSPHATE SERPL-MCNC: 3.2 MG/DL (ref 2.6–4.7)
POTASSIUM SERPL-SCNC: 4.8 MMOL/L (ref 3.5–5.1)
PROT SERPL-MCNC: 6.3 G/DL (ref 6.4–8.2)
SERVICE CMNT-IMP: ABNORMAL
SERVICE CMNT-IMP: ABNORMAL
SODIUM SERPL-SCNC: 140 MMOL/L (ref 136–145)
SPECIMEN HOLD: NORMAL
TIBC SERPL-MCNC: 294 UG/DL (ref 250–450)
TROPONIN I SERPL HS-MCNC: 25 NG/L (ref 0–51)
TROPONIN I SERPL HS-MCNC: 33 NG/L (ref 0–51)
TSH SERPL DL<=0.05 MIU/L-ACNC: 1.86 UIU/ML (ref 0.36–3.74)
VIT B12 SERPL-MCNC: 360 PG/ML (ref 193–986)

## 2024-12-27 PROCEDURE — 84443 ASSAY THYROID STIM HORMONE: CPT

## 2024-12-27 PROCEDURE — 97535 SELF CARE MNGMENT TRAINING: CPT

## 2024-12-27 PROCEDURE — 82746 ASSAY OF FOLIC ACID SERUM: CPT

## 2024-12-27 PROCEDURE — 2060000000 HC ICU INTERMEDIATE R&B

## 2024-12-27 PROCEDURE — 97530 THERAPEUTIC ACTIVITIES: CPT | Performed by: PHYSICAL THERAPIST

## 2024-12-27 PROCEDURE — 84484 ASSAY OF TROPONIN QUANT: CPT

## 2024-12-27 PROCEDURE — 83735 ASSAY OF MAGNESIUM: CPT

## 2024-12-27 PROCEDURE — 36415 COLL VENOUS BLD VENIPUNCTURE: CPT

## 2024-12-27 PROCEDURE — 93010 ELECTROCARDIOGRAM REPORT: CPT | Performed by: SPECIALIST

## 2024-12-27 PROCEDURE — 82962 GLUCOSE BLOOD TEST: CPT

## 2024-12-27 PROCEDURE — 6370000000 HC RX 637 (ALT 250 FOR IP): Performed by: INTERNAL MEDICINE

## 2024-12-27 PROCEDURE — 83550 IRON BINDING TEST: CPT

## 2024-12-27 PROCEDURE — 80053 COMPREHEN METABOLIC PANEL: CPT

## 2024-12-27 PROCEDURE — 97161 PT EVAL LOW COMPLEX 20 MIN: CPT | Performed by: PHYSICAL THERAPIST

## 2024-12-27 PROCEDURE — 84100 ASSAY OF PHOSPHORUS: CPT

## 2024-12-27 PROCEDURE — 97165 OT EVAL LOW COMPLEX 30 MIN: CPT

## 2024-12-27 PROCEDURE — 82728 ASSAY OF FERRITIN: CPT

## 2024-12-27 PROCEDURE — 83540 ASSAY OF IRON: CPT

## 2024-12-27 PROCEDURE — 2500000003 HC RX 250 WO HCPCS: Performed by: INTERNAL MEDICINE

## 2024-12-27 PROCEDURE — 70450 CT HEAD/BRAIN W/O DYE: CPT

## 2024-12-27 PROCEDURE — 82607 VITAMIN B-12: CPT

## 2024-12-27 RX ORDER — POTASSIUM CHLORIDE 7.45 MG/ML
10 INJECTION INTRAVENOUS PRN
Status: DISCONTINUED | OUTPATIENT
Start: 2024-12-27 | End: 2024-12-30 | Stop reason: HOSPADM

## 2024-12-27 RX ORDER — ATORVASTATIN CALCIUM 20 MG/1
10 TABLET, FILM COATED ORAL DAILY
Status: DISCONTINUED | OUTPATIENT
Start: 2024-12-27 | End: 2024-12-30 | Stop reason: HOSPADM

## 2024-12-27 RX ORDER — LABETALOL HYDROCHLORIDE 5 MG/ML
20 INJECTION, SOLUTION INTRAVENOUS PRN
Status: DISCONTINUED | OUTPATIENT
Start: 2024-12-27 | End: 2024-12-30 | Stop reason: HOSPADM

## 2024-12-27 RX ORDER — PANTOPRAZOLE SODIUM 40 MG/1
40 TABLET, DELAYED RELEASE ORAL
Status: DISCONTINUED | OUTPATIENT
Start: 2024-12-27 | End: 2024-12-30 | Stop reason: HOSPADM

## 2024-12-27 RX ORDER — ACETAMINOPHEN 325 MG/1
650 TABLET ORAL EVERY 6 HOURS PRN
Status: DISCONTINUED | OUTPATIENT
Start: 2024-12-27 | End: 2024-12-30 | Stop reason: HOSPADM

## 2024-12-27 RX ORDER — OXYCODONE HYDROCHLORIDE 5 MG/1
5 TABLET ORAL EVERY 6 HOURS PRN
Status: DISCONTINUED | OUTPATIENT
Start: 2024-12-27 | End: 2024-12-27

## 2024-12-27 RX ORDER — POLYETHYLENE GLYCOL 3350 17 G/17G
17 POWDER, FOR SOLUTION ORAL DAILY PRN
Status: DISCONTINUED | OUTPATIENT
Start: 2024-12-27 | End: 2024-12-30 | Stop reason: HOSPADM

## 2024-12-27 RX ORDER — POTASSIUM CHLORIDE 750 MG/1
40 TABLET, EXTENDED RELEASE ORAL PRN
Status: DISCONTINUED | OUTPATIENT
Start: 2024-12-27 | End: 2024-12-30 | Stop reason: HOSPADM

## 2024-12-27 RX ORDER — ONDANSETRON 2 MG/ML
4 INJECTION INTRAMUSCULAR; INTRAVENOUS EVERY 6 HOURS PRN
Status: DISCONTINUED | OUTPATIENT
Start: 2024-12-27 | End: 2024-12-30 | Stop reason: HOSPADM

## 2024-12-27 RX ORDER — SODIUM CHLORIDE 0.9 % (FLUSH) 0.9 %
5-40 SYRINGE (ML) INJECTION PRN
Status: DISCONTINUED | OUTPATIENT
Start: 2024-12-27 | End: 2024-12-30 | Stop reason: HOSPADM

## 2024-12-27 RX ORDER — ACETAMINOPHEN 650 MG/1
650 SUPPOSITORY RECTAL EVERY 6 HOURS PRN
Status: DISCONTINUED | OUTPATIENT
Start: 2024-12-27 | End: 2024-12-30 | Stop reason: HOSPADM

## 2024-12-27 RX ORDER — MAGNESIUM SULFATE IN WATER 40 MG/ML
2000 INJECTION, SOLUTION INTRAVENOUS PRN
Status: DISCONTINUED | OUTPATIENT
Start: 2024-12-27 | End: 2024-12-30 | Stop reason: HOSPADM

## 2024-12-27 RX ORDER — TRAMADOL HYDROCHLORIDE 50 MG/1
25 TABLET ORAL EVERY 6 HOURS PRN
Status: DISCONTINUED | OUTPATIENT
Start: 2024-12-27 | End: 2024-12-30 | Stop reason: HOSPADM

## 2024-12-27 RX ORDER — SODIUM CHLORIDE 9 MG/ML
INJECTION, SOLUTION INTRAVENOUS CONTINUOUS
Status: DISCONTINUED | OUTPATIENT
Start: 2024-12-27 | End: 2024-12-27

## 2024-12-27 RX ORDER — AMLODIPINE BESYLATE 5 MG/1
5 TABLET ORAL DAILY
Status: DISCONTINUED | OUTPATIENT
Start: 2024-12-27 | End: 2024-12-30 | Stop reason: HOSPADM

## 2024-12-27 RX ORDER — SODIUM CHLORIDE 9 MG/ML
INJECTION, SOLUTION INTRAVENOUS PRN
Status: DISCONTINUED | OUTPATIENT
Start: 2024-12-27 | End: 2024-12-30 | Stop reason: HOSPADM

## 2024-12-27 RX ORDER — LISINOPRIL 20 MG/1
20 TABLET ORAL DAILY
Status: DISCONTINUED | OUTPATIENT
Start: 2024-12-27 | End: 2024-12-30 | Stop reason: HOSPADM

## 2024-12-27 RX ORDER — SODIUM CHLORIDE 0.9 % (FLUSH) 0.9 %
5-40 SYRINGE (ML) INJECTION EVERY 12 HOURS SCHEDULED
Status: DISCONTINUED | OUTPATIENT
Start: 2024-12-27 | End: 2024-12-30 | Stop reason: HOSPADM

## 2024-12-27 RX ORDER — ONDANSETRON 4 MG/1
4 TABLET, ORALLY DISINTEGRATING ORAL EVERY 8 HOURS PRN
Status: DISCONTINUED | OUTPATIENT
Start: 2024-12-27 | End: 2024-12-30 | Stop reason: HOSPADM

## 2024-12-27 RX ADMIN — SODIUM CHLORIDE, PRESERVATIVE FREE 10 ML: 5 INJECTION INTRAVENOUS at 10:01

## 2024-12-27 RX ADMIN — TRAMADOL HYDROCHLORIDE 25 MG: 50 TABLET, COATED ORAL at 23:02

## 2024-12-27 RX ADMIN — TRAMADOL HYDROCHLORIDE 25 MG: 50 TABLET, COATED ORAL at 15:19

## 2024-12-27 RX ADMIN — ATORVASTATIN CALCIUM 10 MG: 20 TABLET, FILM COATED ORAL at 10:01

## 2024-12-27 RX ADMIN — AMLODIPINE BESYLATE 5 MG: 5 TABLET ORAL at 10:01

## 2024-12-27 RX ADMIN — LISINOPRIL 20 MG: 20 TABLET ORAL at 10:00

## 2024-12-27 RX ADMIN — SODIUM CHLORIDE, PRESERVATIVE FREE 10 ML: 5 INJECTION INTRAVENOUS at 21:25

## 2024-12-27 ASSESSMENT — PAIN SCALES - GENERAL: PAINLEVEL_OUTOF10: 6

## 2024-12-27 ASSESSMENT — PAIN DESCRIPTION - LOCATION: LOCATION: LEG

## 2024-12-27 ASSESSMENT — PAIN DESCRIPTION - DESCRIPTORS: DESCRIPTORS: BURNING

## 2024-12-27 NOTE — ED NOTES
This RN notified by radiology tech that pt is positive for head bleed. Dr. hZang notified and aware. Plan of care ongoing.

## 2024-12-27 NOTE — ED PROVIDER NOTES
EMERGENCY DEPARTMENT PHYSICIAN NOTE     Patient: Lynda Gagnon     Time of Service: 2024  8:18 PM     Chief complaint:   Chief Complaint   Patient presents with    Leg Pain    Fall        HISTORY:  Patient is a 89 y.o. female who presents to the emergency department with complaints of fall.       Past Medical History:   Diagnosis Date    Acute myocardial infarction of other inferior wall, episode of care unspecified     Allergic rhinitis, cause unspecified     Brachial neuritis or radiculitis NOS     Cancer (HCC)     r arm basal cell    Cervicalgia     Disorder of bone and cartilage, unspecified     GERD (gastroesophageal reflux disease)     Hemorrhoid     Hypercholesterolemia     Incontinence of urine     Insomnia, unspecified     Leaky heart valve     Osteoarthrosis, unspecified whether generalized or localized, unspecified site     Osteoporosis, unspecified     Other abnormal blood chemistry     Pain in joint, shoulder region     r shoulder, pelvic, thigh, l hip, l forearm    Unspecified essential hypertension     Unspecified transient cerebral ischemia     Unspecified vitamin D deficiency     UTI (urinary tract infection)         Past Surgical History:   Procedure Laterality Date    BUNIONECTOMY      CATARACT REMOVAL Bilateral     with L implant, R eye hard contact    CHOLECYSTECTOMY, LAPAROSCOPIC      COLONOSCOPY      PARTIAL HYSTERECTOMY      TUBAL LIGATION          Family History   Problem Relation Age of Onset    Heart Disease Brother     Heart Disease Sister     Stroke Father     Stroke Mother     Cancer Other     Stroke Brother         Social History     Socioeconomic History    Marital status:    Tobacco Use    Smoking status: Former     Current packs/day: 0.00     Types: Cigarettes     Quit date: 3/3/1995     Years since quittin.8    Smokeless tobacco: Former   Substance and Sexual Activity    Alcohol use: Yes     Alcohol/week: 23.3 standard drinks of alcohol    Drug

## 2024-12-27 NOTE — CONSULTS
Brief ICU Consult Note    Briefly, patient BIBA from [facility] for complaints of multiple falls.  During workup, she was found to have acute SAH in posterior right frontal and parietal lobes with trace SDH.  NSGY consulted and recommended repeat CT in am, SBP < 140, and avoidance of AC.  No surgical intervention.  She has no focal deficit on exam.  Dementia at baseline.  No AC as outpatient.  No indication for ICU.  Discussed with Dr. Zhang (ED), Dr. Zhu ([facility]), and Sara (Neurology NP).  Family updated.  Thank you for this consultation on Mrs. Lynda Gagnon.    [facility] Critical Care

## 2024-12-27 NOTE — PALLIATIVE CARE
PALLIATIVE MEDICINE              Consult noted and appreciated.    Discussed with attending who agrees that goals have been identified for SNF and DNR.     Will cancel our consult request.    Willing to see if pt worsens or goal changes and we can be helpful. Please re-consult.          Laura De La Garza MSN, FNP-BC, ACHPN

## 2024-12-27 NOTE — ED NOTES
Pt moved to room in direct line of sight of nurse's station. Bed alarm on. Stretcher in low position. Call-light within reach. Pt attached to cardiac monitor x3.

## 2024-12-27 NOTE — CONSULTS
89yo admitted from [facility] with frequent falls.  No focal deficit, baseline dementia.  Head CT shows diffuse convexity sah posterior frontal parietal with trace SDH.  No surgical intervention.  Recommend avoid anticoagulation, repeat CT in am, okay to eat, BP monitoring with goal SBP<140.  Will follow with you.

## 2024-12-27 NOTE — ED TRIAGE NOTES
Patient BIBA from [facility] with a CC of multiple falls, leg pain and leg weakness. Patient stated she has a hx of osteoarthritis. Has had 4 falls recently. Stated \"the facility only gives me tylenol when I could use something stronger.\"

## 2024-12-27 NOTE — H&P
I/O's, and examined patient.  I have discussed the case and the plan and management of the patient's care with the consulting services, the bedside nurses and necessary ancillary providers.  This patient has a high probability of imminent, clinically significant deterioration, which requires the highest level of preparedness to intervene urgently. I participated in the decision-making and personally managed or directed the management of the following life and organ supporting interventions that required my frequent assessment to treat or prevent imminent deterioration.  I personally spent 45 minutes of critical care time.  This is time spent at this critically ill patient's bedside actively involved in patient care as well as the coordination of care and discussions with the patient's family.  This does not include any procedural time which has been billed separately.      Signed By: Reigna Solis MD     December 27, 2024         Please note that this dictation may have been completed with Dragon, the computer voice recognition software.  Quite often unanticipated grammatical, syntax, homophones, and other interpretive errors are inadvertently transcribed by the computer software.  Please disregard these errors.  Please excuse any errors that have escaped final proofreading.

## 2024-12-28 LAB
ALBUMIN SERPL-MCNC: 3.2 G/DL (ref 3.5–5)
ALBUMIN/GLOB SERPL: 1.2 (ref 1.1–2.2)
ALP SERPL-CCNC: 88 U/L (ref 45–117)
ALT SERPL-CCNC: 24 U/L (ref 12–78)
ANION GAP SERPL CALC-SCNC: 5 MMOL/L (ref 2–12)
AST SERPL-CCNC: 24 U/L (ref 15–37)
BASOPHILS # BLD: 0.1 K/UL (ref 0–0.1)
BASOPHILS NFR BLD: 1 % (ref 0–1)
BILIRUB SERPL-MCNC: 0.4 MG/DL (ref 0.2–1)
BUN SERPL-MCNC: 15 MG/DL (ref 6–20)
BUN/CREAT SERPL: 25 (ref 12–20)
CALCIUM SERPL-MCNC: 8.6 MG/DL (ref 8.5–10.1)
CHLORIDE SERPL-SCNC: 110 MMOL/L (ref 97–108)
CO2 SERPL-SCNC: 25 MMOL/L (ref 21–32)
CREAT SERPL-MCNC: 0.61 MG/DL (ref 0.55–1.02)
DIFFERENTIAL METHOD BLD: ABNORMAL
EOSINOPHIL # BLD: 0.2 K/UL (ref 0–0.4)
EOSINOPHIL NFR BLD: 2 % (ref 0–7)
ERYTHROCYTE [DISTWIDTH] IN BLOOD BY AUTOMATED COUNT: 14.5 % (ref 11.5–14.5)
GLOBULIN SER CALC-MCNC: 2.6 G/DL (ref 2–4)
GLUCOSE SERPL-MCNC: 96 MG/DL (ref 65–100)
HCT VFR BLD AUTO: 31.8 % (ref 35–47)
HGB BLD-MCNC: 9.9 G/DL (ref 11.5–16)
IMM GRANULOCYTES # BLD AUTO: 0.1 K/UL (ref 0–0.04)
IMM GRANULOCYTES NFR BLD AUTO: 1 % (ref 0–0.5)
LYMPHOCYTES # BLD: 1.5 K/UL (ref 0.8–3.5)
LYMPHOCYTES NFR BLD: 17 % (ref 12–49)
MCH RBC QN AUTO: 32.5 PG (ref 26–34)
MCHC RBC AUTO-ENTMCNC: 31.1 G/DL (ref 30–36.5)
MCV RBC AUTO: 104.3 FL (ref 80–99)
MONOCYTES # BLD: 0.7 K/UL (ref 0–1)
MONOCYTES NFR BLD: 8 % (ref 5–13)
NEUTS SEG # BLD: 6.2 K/UL (ref 1.8–8)
NEUTS SEG NFR BLD: 71 % (ref 32–75)
NRBC # BLD: 0 K/UL (ref 0–0.01)
NRBC BLD-RTO: 0 PER 100 WBC
PLATELET # BLD AUTO: 270 K/UL (ref 150–400)
PMV BLD AUTO: 9.8 FL (ref 8.9–12.9)
POTASSIUM SERPL-SCNC: 4.4 MMOL/L (ref 3.5–5.1)
PROT SERPL-MCNC: 5.8 G/DL (ref 6.4–8.2)
RBC # BLD AUTO: 3.05 M/UL (ref 3.8–5.2)
SODIUM SERPL-SCNC: 140 MMOL/L (ref 136–145)
WBC # BLD AUTO: 8.7 K/UL (ref 3.6–11)

## 2024-12-28 PROCEDURE — 85025 COMPLETE CBC W/AUTO DIFF WBC: CPT

## 2024-12-28 PROCEDURE — 6370000000 HC RX 637 (ALT 250 FOR IP): Performed by: INTERNAL MEDICINE

## 2024-12-28 PROCEDURE — 2060000000 HC ICU INTERMEDIATE R&B

## 2024-12-28 PROCEDURE — 2500000003 HC RX 250 WO HCPCS: Performed by: INTERNAL MEDICINE

## 2024-12-28 PROCEDURE — 80053 COMPREHEN METABOLIC PANEL: CPT

## 2024-12-28 RX ORDER — LIDOCAINE 4 G/G
1 PATCH TOPICAL DAILY
Status: DISCONTINUED | OUTPATIENT
Start: 2024-12-28 | End: 2024-12-30 | Stop reason: HOSPADM

## 2024-12-28 RX ORDER — OXYCODONE HYDROCHLORIDE 5 MG/1
2.5 TABLET ORAL EVERY 4 HOURS PRN
Status: DISCONTINUED | OUTPATIENT
Start: 2024-12-28 | End: 2024-12-30 | Stop reason: HOSPADM

## 2024-12-28 RX ADMIN — LISINOPRIL 20 MG: 20 TABLET ORAL at 09:21

## 2024-12-28 RX ADMIN — TRAMADOL HYDROCHLORIDE 25 MG: 50 TABLET, COATED ORAL at 09:09

## 2024-12-28 RX ADMIN — AMLODIPINE BESYLATE 5 MG: 5 TABLET ORAL at 09:21

## 2024-12-28 RX ADMIN — ATORVASTATIN CALCIUM 10 MG: 20 TABLET, FILM COATED ORAL at 09:21

## 2024-12-28 RX ADMIN — TRAMADOL HYDROCHLORIDE 25 MG: 50 TABLET, COATED ORAL at 20:34

## 2024-12-28 RX ADMIN — SODIUM CHLORIDE, PRESERVATIVE FREE 10 ML: 5 INJECTION INTRAVENOUS at 09:23

## 2024-12-28 RX ADMIN — OXYCODONE HYDROCHLORIDE 2.5 MG: 5 TABLET ORAL at 23:51

## 2024-12-28 RX ADMIN — OXYCODONE HYDROCHLORIDE 2.5 MG: 5 TABLET ORAL at 11:55

## 2024-12-28 ASSESSMENT — PAIN SCALES - GENERAL
PAINLEVEL_OUTOF10: 6
PAINLEVEL_OUTOF10: 6

## 2024-12-28 ASSESSMENT — PAIN DESCRIPTION - LOCATION
LOCATION: BACK
LOCATION: BACK

## 2024-12-28 ASSESSMENT — PAIN DESCRIPTION - PAIN TYPE: TYPE: CHRONIC PAIN

## 2024-12-28 ASSESSMENT — PAIN DESCRIPTION - ORIENTATION: ORIENTATION: POSTERIOR

## 2024-12-28 ASSESSMENT — PAIN DESCRIPTION - DESCRIPTORS
DESCRIPTORS: ACHING
DESCRIPTORS: ACHING;DULL

## 2024-12-28 ASSESSMENT — PAIN DESCRIPTION - ONSET: ONSET: ON-GOING

## 2024-12-28 ASSESSMENT — PAIN DESCRIPTION - FREQUENCY: FREQUENCY: CONTINUOUS

## 2024-12-28 ASSESSMENT — PAIN - FUNCTIONAL ASSESSMENT: PAIN_FUNCTIONAL_ASSESSMENT: ACTIVITIES ARE NOT PREVENTED

## 2024-12-29 PROCEDURE — 6370000000 HC RX 637 (ALT 250 FOR IP): Performed by: INTERNAL MEDICINE

## 2024-12-29 PROCEDURE — 2500000003 HC RX 250 WO HCPCS: Performed by: INTERNAL MEDICINE

## 2024-12-29 PROCEDURE — 2060000000 HC ICU INTERMEDIATE R&B

## 2024-12-29 RX ADMIN — ACETAMINOPHEN 650 MG: 325 TABLET ORAL at 12:22

## 2024-12-29 RX ADMIN — ACETAMINOPHEN 650 MG: 325 TABLET ORAL at 20:13

## 2024-12-29 RX ADMIN — OXYCODONE HYDROCHLORIDE 2.5 MG: 5 TABLET ORAL at 21:25

## 2024-12-29 RX ADMIN — ATORVASTATIN CALCIUM 10 MG: 20 TABLET, FILM COATED ORAL at 08:39

## 2024-12-29 RX ADMIN — AMLODIPINE BESYLATE 5 MG: 5 TABLET ORAL at 08:39

## 2024-12-29 RX ADMIN — OXYCODONE HYDROCHLORIDE 2.5 MG: 5 TABLET ORAL at 10:14

## 2024-12-29 RX ADMIN — OXYCODONE HYDROCHLORIDE 2.5 MG: 5 TABLET ORAL at 16:31

## 2024-12-29 RX ADMIN — SODIUM CHLORIDE, PRESERVATIVE FREE 10 ML: 5 INJECTION INTRAVENOUS at 20:14

## 2024-12-29 RX ADMIN — LISINOPRIL 20 MG: 20 TABLET ORAL at 08:39

## 2024-12-29 ASSESSMENT — PAIN DESCRIPTION - LOCATION
LOCATION: BACK
LOCATION: LEG
LOCATION: BACK
LOCATION: ARM;LEG
LOCATION: LEG;KNEE

## 2024-12-29 ASSESSMENT — PAIN DESCRIPTION - DESCRIPTORS
DESCRIPTORS: ACHING
DESCRIPTORS: CRAMPING
DESCRIPTORS: ACHING

## 2024-12-29 ASSESSMENT — PAIN DESCRIPTION - ORIENTATION
ORIENTATION: LEFT
ORIENTATION: LEFT

## 2024-12-29 ASSESSMENT — PAIN SCALES - GENERAL
PAINLEVEL_OUTOF10: 5
PAINLEVEL_OUTOF10: 6
PAINLEVEL_OUTOF10: 9
PAINLEVEL_OUTOF10: 5

## 2024-12-29 NOTE — PROGRESS NOTES
[facility]                                                                                          Hospitalist Progress Note  Sushma Madala, MD  Office Phone: [phone]        Date of Service:  2024  NAME:  Lynda Gagnon  :  1931  MRN:  [id number]       Admission Summary:   yLnda Gagnon is a 89 y.o. female with past medical history significant for dyslipidemia, hypertension, aortic valve stenosis s/p TAVR, dementia presented at the emergency room for evaluation following multiple falls at the facility where the patient resides.  Patient reported bilateral leg pain and weakness causing her to have multiple falls at the facility.  Patient also has a history of osteoarthritis.  Patient is unable to provide reliable history because of her underlying dementia.  It was reported that the patient has had about 4 falls in the past couple of days.  No loss of consciousness or head injury reported with the falls.  She was last admitted to the hospital from 3/17/2022 to 3/22/2022, she was admitted to [facility] for evaluation and treatment of GI bleed, she underwent upper and lower endoscopy which did not reveal any significant pathology, received 1 unit of packed red blood cell and discharged from the hospital in stable condition.  CT of the head done on arrival at the emergency room shows  acute subarachnoid hemorrhage as well as trace subdural hemorrhage.  Patient was discussed with the neurosurgery service on-call and no intervention advised or planned.  Patient was also seen by ICU team and deemed not a candidate for ICU care at this time.  She was referred to the hospitalist service for admission.          Interval history / Subjective:   Patient is seen and examined at bedside this AM. She is alert and oriented x 2. Still has leg pain but appears comfortable now.   Discussed with nursing     Assessment & Plan:      Subarachnoid hemorrhage  Subdural 
        [facility]                                                                                          Hospitalist Progress Note  Sushma Madala, MD  Office Phone: [phone]        Date of Service:  2024  NAME:  Lynda Gagnon  :  1931  MRN:  [id number]       Admission Summary:   Lynda Gagnon is a 89 y.o. female with past medical history significant for dyslipidemia, hypertension, aortic valve stenosis s/p TAVR, dementia presented at the emergency room for evaluation following multiple falls at the facility where the patient resides.  Patient reported bilateral leg pain and weakness causing her to have multiple falls at the facility.  Patient also has a history of osteoarthritis.  Patient is unable to provide reliable history because of her underlying dementia.  It was reported that the patient has had about 4 falls in the past couple of days.  No loss of consciousness or head injury reported with the falls.  She was last admitted to the hospital from 3/17/2022 to 3/22/2022, she was admitted to [facility] for evaluation and treatment of GI bleed, she underwent upper and lower endoscopy which did not reveal any significant pathology, received 1 unit of packed red blood cell and discharged from the hospital in stable condition.  CT of the head done on arrival at the emergency room shows  acute subarachnoid hemorrhage as well as trace subdural hemorrhage.  Patient was discussed with the neurosurgery service on-call and no intervention advised or planned.  Patient was also seen by ICU team and deemed not a candidate for ICU care at this time.  She was referred to the hospitalist service for admission.          Interval history / Subjective:   Patient is seen and examined at bedside this AM. She is alert and oriented x 2. C/w leg pain - reassured her.   Discussed with nursing, greg     Spoke with daughter Jacque on phone and updated pt's status  -  
  ICH Documentation Note     Brief HPI: 89 yo with multiple recent falls. C/O leg pain and weakness.     Medical hx  Dementia, htn, aortic valve stenosis, reflux, hyperlipidemia    Traumatic ICH?   YES   ICH Score ON   ARRIVAL:    Barnes Chau/Modified Clement on arrival (if indicated):      No ICH score in traumatic bleed    Imaging:   CT Result (most recent):  CT CERVICAL SPINE WO CONTRAST 12/26/2024    Narrative  EXAM:  CT CERVICAL SPINE WITHOUT CONTRAST    INDICATION: fall. dementia. Reason for exam:->fall. dementia    COMPARISON: September 2021    CONTRAST:  None.    TECHNIQUE: Multislice helical CT of the cervical spine was performed without  intravenous contrast administration.  Sagittal and coronal reformats were  generated.  CT dose reduction was achieved through use of a standardized  protocol tailored for this examination and automatic exposure control for dose  modulation.    FINDINGS:    The alignment is within normal limits. There is no fracture or compression  deformity. The odontoid process is intact. The craniocervical junction is within  normal limits.    Lung apices are clear. Heterogeneous thyroid gland. Dense calcifications of the  carotid bulbs bilaterally.  Multilevel degenerative changes in the cervical spine with disc height loss and  endplate irregularity, worst at C5-6 and C6-7.    Impression  Multilevel cervical spine degenerative changes without fracture or traumatic  malalignment.    Electronically signed by CORBIN MCGARRY       Plan:     NSGY consult   SBP goal <140     Time spent: 10 minutes.     MARLI Nguyen - CNP  Neurovascular Nurse Practitioner  [phone]   
Repeat CT shows stable sah  Recommend avoid anticoagulation  Ok to follow up with me as outpatient in 4-6 weeks with repeat Head Ct no contrast.  Will be available as needed  
Spiritual Health History and Assessment/Progress Note  [facility]    Follow-up,  ,  ,      Name: Lynda Gagnon MRN: [id number]    Age: 89 y.o.     Sex: female   Language: English   Christianity: Voodoo   Subarachnoid hemorrhage (HCC)     Date: 12/28/2024            Total Time Calculated: 37 min              Spiritual Assessment began in [facility] 6S NEURO-SCI TELE        Referral/Consult From: Patient   Encounter Overview/Reason: Follow-up  Service Provided For: Patient    Deann, Belief, Meaning:   Patient is connected with a deann tradition or spiritual practice and has beliefs or practices that help with coping during difficult times  Family/Friends No family/friends present      Importance and Influence:  Patient has spiritual/personal beliefs that influence decisions regarding their health  Family/Friends No family/friends present    Community:  Patient feels well-supported. Support system includes: Children, Deann Community, and Friends  Family/Friends No family/friends present    Assessment and Plan of Care:     Patient Interventions include: Facilitated expression of thoughts and feelings, Explored spiritual coping/struggle/distress, and Affirmed coping skills/support systems  Family/Friends Interventions include: No family/friends present    Patient Plan of Care: Spiritual Care available upon further referral  Family/Friends Plan of Care: No family/friends present    Electronically signed by Chaplain Zachary Resident, M.Div., on 12/28/2024 at 11:19 AM   
potassium chloride (KLOR-CON) extended release tablet 40 mEq  40 mEq Oral PRN    Or    potassium bicarb-citric acid (EFFER-K) effervescent tablet 40 mEq  40 mEq Oral PRN    Or    potassium chloride 10 mEq/100 mL IVPB (Peripheral Line)  10 mEq IntraVENous PRN    magnesium sulfate 2000 mg in 50 mL IVPB premix  2,000 mg IntraVENous PRN    ondansetron (ZOFRAN-ODT) disintegrating tablet 4 mg  4 mg Oral Q8H PRN    Or    ondansetron (ZOFRAN) injection 4 mg  4 mg IntraVENous Q6H PRN    polyethylene glycol (GLYCOLAX) packet 17 g  17 g Oral Daily PRN    acetaminophen (TYLENOL) tablet 650 mg  650 mg Oral Q6H PRN    Or    acetaminophen (TYLENOL) suppository 650 mg  650 mg Rectal Q6H PRN     ______________________________________________________________________  EXPECTED LENGTH OF STAY: 4  ACTUAL LENGTH OF STAY:          0                 Sushma Madala, MD

## 2024-12-30 VITALS
WEIGHT: 101.5 LBS | TEMPERATURE: 97.5 F | HEART RATE: 81 BPM | SYSTOLIC BLOOD PRESSURE: 100 MMHG | RESPIRATION RATE: 15 BRPM | DIASTOLIC BLOOD PRESSURE: 64 MMHG | OXYGEN SATURATION: 97 % | BODY MASS INDEX: 19.82 KG/M2

## 2024-12-30 PROCEDURE — 6370000000 HC RX 637 (ALT 250 FOR IP): Performed by: INTERNAL MEDICINE

## 2024-12-30 RX ORDER — LIDOCAINE 4 G/G
1 PATCH TOPICAL DAILY
Qty: 30 EACH | Refills: 0 | Status: SHIPPED
Start: 2024-12-31

## 2024-12-30 RX ORDER — OXYCODONE HYDROCHLORIDE 5 MG/1
2.5 TABLET ORAL EVERY 12 HOURS PRN
Qty: 4 TABLET | Refills: 0 | Status: SHIPPED | OUTPATIENT
Start: 2024-12-30 | End: 2025-01-03

## 2024-12-30 RX ADMIN — OXYCODONE HYDROCHLORIDE 2.5 MG: 5 TABLET ORAL at 05:55

## 2024-12-30 RX ADMIN — PANTOPRAZOLE SODIUM 40 MG: 40 TABLET, DELAYED RELEASE ORAL at 05:55

## 2024-12-30 RX ADMIN — ACETAMINOPHEN 650 MG: 325 TABLET ORAL at 08:52

## 2024-12-30 RX ADMIN — ATORVASTATIN CALCIUM 10 MG: 20 TABLET, FILM COATED ORAL at 08:52

## 2024-12-30 RX ADMIN — TRAMADOL HYDROCHLORIDE 25 MG: 50 TABLET, COATED ORAL at 08:52

## 2024-12-30 RX ADMIN — LISINOPRIL 20 MG: 20 TABLET ORAL at 08:53

## 2024-12-30 RX ADMIN — AMLODIPINE BESYLATE 5 MG: 5 TABLET ORAL at 08:53

## 2024-12-30 ASSESSMENT — PAIN DESCRIPTION - LOCATION: LOCATION: BACK;LEG

## 2024-12-30 ASSESSMENT — PAIN SCALES - GENERAL
PAINLEVEL_OUTOF10: 3
PAINLEVEL_OUTOF10: 0
PAINLEVEL_OUTOF10: 9

## 2024-12-30 ASSESSMENT — PAIN DESCRIPTION - ORIENTATION: ORIENTATION: LEFT

## 2024-12-30 NOTE — CARE COORDINATION
Transition of Care Plan:    RUR: 14%  Prior Level of Functioning: Assisted living at [facility]  Disposition: [facility] healthcare-3 night stay needed.  Could discharge on 12/30  RAFIQ spoke to Arnold López in admissions -[phone].  They will accept patient on Monday.  CHARMAINE: 12/30  If SNF or IPR: Date FOC offered: 12/27  Date FOC received: 12/27  Accepting facility: [facility]  Date authorization started with reference number:   Date authorization received and expires:   Follow up appointments: PCP/Specialist  DME needed: No  Transportation at discharge: BLS  IM/IMM Medicare/ letter given: 12/27  Is patient a  and connected with VA?    If yes, was  transfer form completed and VA notified?   Caregiver Contact: Jacque Appiah [phone]  Discharge Caregiver contacted prior to discharge? Yes  Care Conference needed? No  Barriers to discharge:  3 night stay    Candy Sanchez RN/CRM  [phone]    
Transition of Care: Anticipate discharge to [facility] Referral sent via [employer]  Anticipate discharge Saturday or Sunday- cm informed [place].  RUR 13%  Transportation: [facility]    Cm contacted patients daughter Jacque Appiah ([phone]), cm introduced self, explained role and offered support. Patients daughter Jacque would like the patient discharged to [facility] before returning to [facility]. Referral sent to [place].     Patient with baseline dementia.  Patient ambulates with a RW but mostly transfers to w/c.    HOME SUPPORT PRIOR TO ADMISSION: Patient lived in retirement at [facility]          12/27/24 4033   Service Assessment   Patient Orientation Unable to Assess  (Contacted patients daughter)   Cognition Dementia / Early Alzheimer's   History Provided By Child/Family   Primary Caregiver Other (Comment)  (Lives at [facility])   Support Systems Children  ([facility])   PCP Verified by CM Yes   Prior Functional Level Assistance with the following:;Bathing;Dressing;Toileting;Feeding;Cooking;Housework;Shopping;Mobility   Current Functional Level Assistance with the following:;Bathing;Dressing;Toileting;Feeding;Cooking;Housework;Shopping;Mobility   Can patient return to prior living arrangement No  (Needs a short stay at [facility]   Ability to make needs known: Poor  (patient has dementia)   Family able to assist with home care needs: No   Would you like for me to discuss the discharge plan with any other family members/significant others, and if so, who? Yes   Social/Functional History   Type of Home Assisted living  ([place])   Home Layout One level   Home Equipment Wheelchair - Manual;Rollator   Discharge Planning   Patient expects to be discharged to: Skilled nursing facility   Services At/After Discharge   Mode of Transport at Discharge [facility]       
  []Total Parenteral Management (TPN)    Financial Resources:  []Medicaid Application Completed    []UAI Completed and copy given to pt/family  and copy given to pt/family  []A screening has previously been completed.    []Level II Completed    [x] Private pay individual who will not become   financially eligible for Medicaid within 6 months from admission to a [facility].     [] Individual refused to have screening conducted.     []Medicaid Application Completed    []The screening denied because it was determined individual did not need/did not qualify for nursing facility level of care.  [] Out of state residents seeking direct admission to a VA nursing facility.  [] Individuals who are inpatients of an out of state hospital, or in state or out of state veterans/ hospital and seek direct admission to a VA nursing facility  [] Individuals who are pateints or residents of a state owned/operated facility that is licensed by Department of Behavioral Services (DBHDS) and seek direct admission to VA nursing facility  [] A screening not required for enrollment in Medicaid Hospice services as set out in 12 VAC 30-  [] [facility] staff shall perform screenings of the [facility] clients.    Advanced Care Plan:  []Surrogate Decision Maker of Care  []POA  []Communicated Code Status and copy sent.    Other:         Kiki Kirkland M.S (Ally).LISE.

## 2024-12-30 NOTE — DISCHARGE SUMMARY
Discharge Summary       PATIENT ID: Lynda Gagnon  MRN: [id number]   YOB: 1931    DATE OF ADMISSION: 12/26/2024  8:18 PM    DATE OF DISCHARGE: 12/30/2024    PRIMARY CARE PROVIDER: Quentin Wills MD     ATTENDING PHYSICIAN: Dr. Wade   DISCHARGING PROVIDER: Sushma Madala, MD    To contact this individual call [phone] and ask the  to page.  If unavailable ask to be transferred the Adult Hospitalist Department.    CONSULTATIONS: IP CONSULT TO NEUROSURGERY  IP CONSULT TO INTENSIVIST    PROCEDURES/SURGERIES: * No surgery found *    DIAGNOSES & HOSPITAL COURSE:   Per HPI:\"Lynda Gagnon is a 89 y.o. female with past medical history significant for dyslipidemia, hypertension, aortic valve stenosis s/p TAVR, dementia presented at the emergency room for evaluation following multiple falls at the facility where the patient resides.  Patient reported bilateral leg pain and weakness causing her to have multiple falls at the facility.  Patient also has a history of osteoarthritis.  Patient is unable to provide reliable history because of her underlying dementia.  It was reported that the patient has had about 4 falls in the past couple of days.  No loss of consciousness or head injury reported with the falls.  She was last admitted to the hospital from 3/17/2022 to 3/22/2022, she was admitted to [facility] for evaluation and treatment of GI bleed, she underwent upper and lower endoscopy which did not reveal any significant pathology, received 1 unit of packed red blood cell and discharged from the hospital in stable condition.  CT of the head done on arrival at the emergency room shows  acute subarachnoid hemorrhage as well as trace subdural hemorrhage.  Patient was discussed with the neurosurgery service on-call and no intervention advised or planned.  Patient was also seen by ICU team and deemed not a candidate for ICU care at this time.  She was referred to the hospitalist service for

## 2024-12-30 NOTE — WOUND CARE
dressing in use.    1. POA left lower leg  2 x 2.1 x 0.3 cm  Full thickness traumatic wound  70% crusted scab, 30% devitalized tissue with open margins  no exudate; no malodor or purulence - no gross S&S of infection  Periwound intact & with erythema    Tx: Cleansed with vashe, covered with triad, gauze, and roll gauze.         Left third toe   Dry crusted stable      Recommend:    Left lower leg:  Cleanse with vashe for 5-10 minutes. Apply triad, cover with gauze and wrap with roll gauze. Change daily    and Andi Protocol:  Turn/reposition approximately every 2 hours  Offload heels with heels hanging off end of pillow at all times while in bed.  Address incontinence/Maintain continence    Discussed with RN.    Transition of Care: Plan to follow weekly and as needed while admitted to hospital.     Marium SOLORZANO, RN  Wound, Ostomy, Continence Nurse  office [phone]  Available via [employer]

## (undated) DEVICE — GUIDEWIRE ORTH L300MM DIA2.8MM S STL THRD SHRP TRCR TIP FOR

## (undated) DEVICE — SOLUTION IRRIG 3000ML 0.9% SOD CHL FLX CONT 0797208] ICU MEDICAL INC]

## (undated) DEVICE — DRAPE,EXTREMITY,89X128,STERILE: Brand: MEDLINE

## (undated) DEVICE — BANDAGE COMPR SELF ADH 5 YDX4 IN TAN STRL PREMIERPRO LF

## (undated) DEVICE — LIGHT HANDLE: Brand: DEVON

## (undated) DEVICE — Z CONVERTED USE 2271148 CONNECTOR TBNG POLYPR 5IN1 TOUGH SHATTERPROOF FOR 5-11MM TB

## (undated) DEVICE — TRAY CATH OD16FR SIL URIN M STATLOK STBL DEV SURSTP

## (undated) DEVICE — GOWN,SIRUS,NONRNF,SETINSLV,2XL,18/CS: Brand: MEDLINE

## (undated) DEVICE — BIT DRL L150MM DIA2MM CANN QUIK CPL W/O STP REUSE FOR 3MM

## (undated) DEVICE — DRAPE,U/ SHT,SPLIT,PLAS,STERIL: Brand: MEDLINE

## (undated) DEVICE — DEVON™ KNEE AND BODY STRAP 60" X 3" (1.5 M X 7.6 CM): Brand: DEVON

## (undated) DEVICE — STAPLER SKIN 35CT WD STRL DISP -- MULTIFIRE PREMIUM

## (undated) DEVICE — KENDALL SCD EXPRESS SLEEVES, KNEE LENGTH, MEDIUM: Brand: KENDALL SCD

## (undated) DEVICE — BIT DRL L200MM DIA2.8MM CALIB L100MM FOR 3.5MM VA LCP PROX

## (undated) DEVICE — STOCKINETTE,IMPERVIOUS,12X48,STERILE: Brand: MEDLINE

## (undated) DEVICE — Device

## (undated) DEVICE — SUTURE VCRL 0 L27IN ABSRB UD CT L40MM 1/2 CIR TAPERPOINT J280H

## (undated) DEVICE — STERILE POLYISOPRENE POWDER-FREE SURGICAL GLOVES WITH EMOLLIENT COATING: Brand: PROTEXIS

## (undated) DEVICE — (D)PREP SKN CHLRAPRP APPL 26ML -- CONVERT TO ITEM 371833

## (undated) DEVICE — BIPOLAR FORCEPS CORD: Brand: VALLEYLAB

## (undated) DEVICE — MEDI-VAC NON-CONDUCTIVE SUCTION TUBING: Brand: CARDINAL HEALTH

## (undated) DEVICE — SUTURE VCRL SZ 0 L36IN ABSRB UD L40MM CT 1/2 CIR TAPERPOINT J958H

## (undated) DEVICE — DRESSING,GAUZE,XEROFORM,CURAD,5"X9",ST: Brand: CURAD

## (undated) DEVICE — ROCKER SWITCH PENCIL BLADE ELECTRODE, HOLSTER: Brand: EDGE

## (undated) DEVICE — STERILE POLYISOPRENE POWDER-FREE SURGICAL GLOVES: Brand: PROTEXIS

## (undated) DEVICE — INFECTION CONTROL KIT SYS

## (undated) DEVICE — ZIMMER® STERILE DISPOSABLE TOURNIQUET CUFF WITH PROTECTIVE SLEEVE AND PLC, DUAL PORT, SINGLE BLADDER, 18 IN. (46 CM)

## (undated) DEVICE — SPONGE GZ W4XL4IN COT 12 PLY TYP VII WVN C FLD DSGN

## (undated) DEVICE — SPONGE LAP 18X18IN STRL -- 5/PK

## (undated) DEVICE — SUTURE VCRL SZ 2-0 L18IN ABSRB UD L26MM CP-2 1/2 CIR REV J762D

## (undated) DEVICE — HANDPIECE SET WITH COAXIAL HIGH FLOW TIP AND SUCTION TUBE: Brand: INTERPULSE

## (undated) DEVICE — DRAPE XR C ARM 41X74IN LF --

## (undated) DEVICE — SURGICAL PROCEDURE PACK BASIN MAJ SET CUST NO CAUT

## (undated) DEVICE — 3M™ STERI-DRAPE™ U-DRAPE 1015: Brand: STERI-DRAPE™

## (undated) DEVICE — REM POLYHESIVE ADULT PATIENT RETURN ELECTRODE: Brand: VALLEYLAB

## (undated) DEVICE — DRAPE,REIN 53X77,STERILE: Brand: MEDLINE

## (undated) DEVICE — BIT DRL L300MM DIA5MM CANN QUIK CPL ADJ STP REUSE

## (undated) DEVICE — 2.0MM DRILL BIT WITH DEPTH MARK/QC/140MM

## (undated) DEVICE — 2.5MM DRILL BIT/QC/GOLD/110MM

## (undated) DEVICE — BLADE SAW OSC COARSE 25X9MM --

## (undated) DEVICE — PACK,BASIC,SIRUS,V: Brand: MEDLINE

## (undated) DEVICE — GUIDEWIRE ORTH L150MM DIA1.1MM S STL NTHRD FOR 3MM CANN SCR

## (undated) DEVICE — HOOK LOCK LATEX FREE ELASTIC BANDAGE 6INX5YD

## (undated) DEVICE — VESSEL LOOPS,MAXI, BLUE: Brand: DEVON

## (undated) DEVICE — ABDOMINAL PAD: Brand: DERMACEA